# Patient Record
Sex: FEMALE | Race: OTHER | HISPANIC OR LATINO | Employment: UNEMPLOYED | URBAN - METROPOLITAN AREA
[De-identification: names, ages, dates, MRNs, and addresses within clinical notes are randomized per-mention and may not be internally consistent; named-entity substitution may affect disease eponyms.]

---

## 2020-04-13 ENCOUNTER — TELEPHONE (OUTPATIENT)
Dept: OBSTETRICS AND GYNECOLOGY | Facility: CLINIC | Age: 40
End: 2020-04-13

## 2020-04-13 NOTE — TELEPHONE ENCOUNTER
----- Message from Pilar Jalloh sent at 4/13/2020  4:17 PM CDT -----  Contact: pt   Type:  Patient Returning Call    Who Called:pt   Who Left Message for Patient:nq  Does the patient know what this is regarding?:nq  Would the patient rather a call back or a response via [facility]? Callback   Best Call Back Number:[phone]   Additional Information:

## 2020-04-13 NOTE — TELEPHONE ENCOUNTER
----- Message from Ruby Freeman sent at 4/13/2020 11:24 AM CDT -----  Contact: patient  Type:  Needs Medical Advice    Who Called: patient  Symptoms (please be specific): yeast infection   How long has patient had these symptoms:  3 days  Pharmacy name and phone #:  [facility] [place]  Would the patient rather a call back or a response via [facility]? Call back  Best Call Back Number: [phone]  Additional Information: patient was sent [facility] link incases she needed to be seen

## 2020-04-13 NOTE — TELEPHONE ENCOUNTER
Called and spoke with patient. Patient's  PCP, Dr. Rubin, had called out Diflucan last week. She reports no improvement and her PCP said it possibly maybe BV. Scheduled pt virtual appointment for tomorrow morning with Marie Everett for evaluation. Pt given appointment time and verbalized understanding. Cassy Giles

## 2020-04-14 ENCOUNTER — OFFICE VISIT (OUTPATIENT)
Dept: OBSTETRICS AND GYNECOLOGY | Facility: CLINIC | Age: 40
End: 2020-04-14
Payer: COMMERCIAL

## 2020-04-14 ENCOUNTER — TELEPHONE (OUTPATIENT)
Dept: OBSTETRICS AND GYNECOLOGY | Facility: CLINIC | Age: 40
End: 2020-04-14

## 2020-04-14 DIAGNOSIS — N89.8 VAGINAL DISCHARGE: Primary | ICD-10-CM

## 2020-04-14 DIAGNOSIS — B96.89 BACTERIAL VAGINOSIS: ICD-10-CM

## 2020-04-14 DIAGNOSIS — N76.0 BACTERIAL VAGINOSIS: ICD-10-CM

## 2020-04-14 PROCEDURE — 99213 PR OFFICE/OUTPT VISIT, EST, LEVL III, 20-29 MIN: ICD-10-PCS | Mod: 95,,, | Performed by: NURSE PRACTITIONER

## 2020-04-14 PROCEDURE — 99213 OFFICE O/P EST LOW 20 MIN: CPT | Mod: 95,,, | Performed by: NURSE PRACTITIONER

## 2020-04-14 RX ORDER — GLIMEPIRIDE 4 MG/1
TABLET ORAL
COMMUNITY
Start: 2020-04-06 | End: 2021-08-25 | Stop reason: ALTCHOICE

## 2020-04-14 RX ORDER — METFORMIN HYDROCHLORIDE 500 MG/1
TABLET, EXTENDED RELEASE ORAL
COMMUNITY
Start: 2020-04-06 | End: 2021-09-23

## 2020-04-14 RX ORDER — METHENAMINE, SODIUM PHOSPHATE, MONOBASIC, MONOHYDRATE, PHENYL SALICYLATE, METHYLENE BLUE, AND HYOSCYAMINE SULFATE 118; 40.8; 36; 10; .12 MG/1; MG/1; MG/1; MG/1; MG/1
CAPSULE ORAL
COMMUNITY
Start: 2020-03-18 | End: 2021-08-25

## 2020-04-14 RX ORDER — FLUCONAZOLE 150 MG/1
TABLET ORAL
COMMUNITY
Start: 2019-01-31 | End: 2021-09-23

## 2020-04-14 RX ORDER — TINIDAZOLE 500 MG/1
2 TABLET ORAL
Qty: 8 TABLET | Refills: 0 | Status: SHIPPED | OUTPATIENT
Start: 2020-04-14 | End: 2020-04-16

## 2020-04-14 RX ORDER — OXYBUTYNIN CHLORIDE 5 MG/1
TABLET ORAL
COMMUNITY
Start: 2020-03-06 | End: 2021-08-25

## 2020-04-14 RX ORDER — DICLOFENAC SODIUM 75 MG/1
TABLET, DELAYED RELEASE ORAL
COMMUNITY
Start: 2020-04-09 | End: 2022-02-09

## 2020-04-14 RX ORDER — ESTRADIOL 0.1 MG/G
CREAM VAGINAL
COMMUNITY
Start: 2020-02-07 | End: 2021-09-23

## 2020-04-14 RX ORDER — PROMETHAZINE HYDROCHLORIDE AND DEXTROMETHORPHAN HYDROBROMIDE 6.25; 15 MG/5ML; MG/5ML
SYRUP ORAL
COMMUNITY
Start: 2020-03-01 | End: 2021-09-23

## 2020-04-14 RX ORDER — ALBUTEROL SULFATE 90 UG/1
AEROSOL, METERED RESPIRATORY (INHALATION)
COMMUNITY
Start: 2020-03-31 | End: 2023-08-29

## 2020-04-14 NOTE — TELEPHONE ENCOUNTER
----- Message from Dioni Meneses sent at 4/14/2020 10:17 AM CDT -----  Contact: Pt  Rosa said she can't get into the appt for her virtual visit this morning. Please call her to discuss this [phone] (home).

## 2020-04-14 NOTE — PROGRESS NOTES
Subjective:       Patient ID: Rosa Anguiano is a 39 y.o. female.    Chief Complaint:  Vaginal Discharge      History of Present Illness  The patient location is: [place], [place]  The chief complaint leading to consultation is: vaginal discharge  Visit type: audio only  Total time spent with patient: 25 minutes  Each patient to whom he or she provides medical services by telemedicine is:  (1) informed of the relationship between the physician and patient and the respective role of any other health care provider with respect to management of the patient; and (2) notified that he or she may decline to receive medical services by telemedicine and may withdraw from such care at any time.    Notes: pt reports that she was on multiple abx for pneumonia. Reports that she was treated by PCP after about a week being off abx for a potential yeast infection with diflucan 100 mg po daily. She got no relief and started with vaginal discharge that is yellow with a fishy odor. Not on ptobiotics    Vaginal Discharge   The patient's primary symptoms include genital itching, a genital odor and vaginal discharge. The patient's pertinent negatives include no pelvic pain. This is a new problem. The current episode started 1 to 4 weeks ago. The problem occurs constantly. The problem has been unchanged. The patient is experiencing no pain. She is not pregnant. Associated symptoms include back pain and dysuria. Pertinent negatives include no abdominal pain, chills, constipation, fever, flank pain, frequency, headaches, hematuria, nausea, rash, urgency or vomiting. Associated symptoms comments: Reports that it is intermittent and the dysuria is not often. The vaginal discharge was yellow and malodorous. The vaginal bleeding is typical of menses. She has not been passing clots. She has not been passing tissue. Nothing aggravates the symptoms. She has tried nothing for the symptoms. She uses an IUD for contraception. Her menstrual history has  been regular. There is no history of menorrhagia.     vaginal discharge and irritation    GYN & OB History  No LMP recorded.   Date of Last Pap: No result found    OB History    Para Term  AB Living   7 4     3     SAB TAB Ectopic Multiple Live Births   2   1          # Outcome Date GA Lbr Ward/2nd Weight Sex Delivery Anes PTL Lv   7 Ectopic            6 SAB            5 SAB            4 Para      CS-Unspec      3 Para      CS-Unspec      2 Para      CS-Unspec      1 Para      CS-Unspec          Review of Systems  Review of Systems   Constitutional: Negative for activity change, appetite change, chills, fatigue and fever.   HENT: Negative for nasal congestion and tinnitus.    Eyes: Negative for visual disturbance.   Respiratory: Negative for cough and shortness of breath.    Cardiovascular: Negative for chest pain and palpitations.   Gastrointestinal: Negative for abdominal pain, bloating, blood in stool, constipation, nausea and vomiting.   Endocrine: Positive for diabetes. Negative for hair loss and hot flashes.        Recently added glimipride   Genitourinary: Positive for dysuria and vaginal discharge. Negative for bladder incontinence, decreased libido, dysmenorrhea, dyspareunia, flank pain, frequency, genital sores, hematuria, hot flashes, menorrhagia, menstrual problem, pelvic pain, urgency, vaginal bleeding, vaginal pain, urinary incontinence, postcoital bleeding, postmenopausal bleeding, vaginal dryness and vaginal odor.        Fishy odor   Musculoskeletal: Positive for back pain. Negative for arthralgias, leg pain and myalgias.   Integumentary:  Negative for rash, acne, hair changes, mole/lesion, breast mass, nipple discharge, breast skin changes and breast tenderness.   Neurological: Negative for vertigo, syncope, numbness and headaches.   Hematological: Does not bruise/bleed easily.   Psychiatric/Behavioral: Negative for depression and sleep disturbance. The patient is not nervous/anxious.     Breast: Negative for asymmetry, lump, mass, mastodynia, nipple discharge, skin changes and tenderness          Objective:    Physical Exam:    HENT:   Nose: No epistaxis.                                   Assessment:        1. Vaginal discharge       2. Bacterial Vaginosis        Plan:      It is presumed that you vaginitis. This can cause itching, odor, burning and discharge. I will call you with your swab results. In the mean time please start a probiotic. The probiotic will help to balance vaginal PH decreasing vaginal inflammation and boost vaginal health. Do not douche. Refrain from tub bathing at this time. The following medication has been sent to your pharmacy:Tinidazole

## 2020-04-14 NOTE — PATIENT INSTRUCTIONS

## 2020-04-14 NOTE — TELEPHONE ENCOUNTER
Called and spoke with patient. She is having trouble with the [employer] pa[employer]lette due to poor wifi connection. Pt carrol Salazar will do a telephone visit. Pt verbalized understanding. Cassy Giles

## 2020-09-30 ENCOUNTER — TELEPHONE (OUTPATIENT)
Dept: OBSTETRICS AND GYNECOLOGY | Facility: CLINIC | Age: 40
End: 2020-09-30

## 2020-09-30 NOTE — TELEPHONE ENCOUNTER
----- Message from Zion Xavier sent at 9/30/2020  2:43 PM CDT -----  Type:  Needs Medical Advice    Who Called: pt  Symptoms (please be specific): pt has a yeast infection   How long has patient had these symptoms:  3 days  Pharmacy name and phone #:    [id number] - [place], [place] - [address] AT [address]  ([address] [facility]  [address]  [place] [address]  Phone: [phone] Fax: [phone]  Would the patient rather a call back or a response via [facility]? Call back  Best Call Back Number: [phone]  Additional Information: Caller is calling in regards to something being called in for a yeast infection // pt states she is in pain //

## 2020-09-30 NOTE — TELEPHONE ENCOUNTER
Called and spoke with patient regarding her message. Pt adv to come into office for evaluation. Pt verbalized understanding and scheduled appointment for Friday.

## 2021-08-20 ENCOUNTER — TELEPHONE (OUTPATIENT)
Dept: OBSTETRICS AND GYNECOLOGY | Facility: CLINIC | Age: 41
End: 2021-08-20

## 2021-08-25 ENCOUNTER — OFFICE VISIT (OUTPATIENT)
Dept: UROLOGY | Facility: CLINIC | Age: 41
End: 2021-08-25
Payer: MEDICARE

## 2021-08-25 VITALS — BODY MASS INDEX: 34.68 KG/M2 | HEIGHT: 59 IN | WEIGHT: 172 LBS

## 2021-08-25 DIAGNOSIS — R30.0 DYSURIA: Primary | ICD-10-CM

## 2021-08-25 LAB — POC RESIDUAL URINE VOLUME: 0 ML (ref 0–100)

## 2021-08-25 PROCEDURE — 51798 US URINE CAPACITY MEASURE: CPT | Mod: S$GLB,,, | Performed by: NURSE PRACTITIONER

## 2021-08-25 PROCEDURE — 99214 PR OFFICE/OUTPT VISIT, EST, LEVL IV, 30-39 MIN: ICD-10-PCS | Mod: S$GLB,,, | Performed by: NURSE PRACTITIONER

## 2021-08-25 PROCEDURE — 51798 POCT BLADDER SCAN: ICD-10-PCS | Mod: S$GLB,,, | Performed by: NURSE PRACTITIONER

## 2021-08-25 PROCEDURE — 99214 OFFICE O/P EST MOD 30 MIN: CPT | Mod: S$GLB,,, | Performed by: NURSE PRACTITIONER

## 2021-08-25 RX ORDER — PHENAZOPYRIDINE HYDROCHLORIDE 200 MG/1
TABLET, FILM COATED ORAL
COMMUNITY
Start: 2021-08-02 | End: 2021-08-25

## 2021-08-25 RX ORDER — VALACYCLOVIR HYDROCHLORIDE 1 G/1
1000 TABLET, FILM COATED ORAL 3 TIMES DAILY
COMMUNITY
Start: 2021-08-17 | End: 2021-09-23

## 2021-08-25 RX ORDER — GABAPENTIN 400 MG/1
CAPSULE ORAL
COMMUNITY
Start: 2021-04-06 | End: 2021-09-23

## 2021-08-25 RX ORDER — SOLIFENACIN SUCCINATE 10 MG/1
10 TABLET, FILM COATED ORAL DAILY
Qty: 30 TABLET | Refills: 11 | Status: SHIPPED | OUTPATIENT
Start: 2021-08-25 | End: 2021-09-23

## 2021-08-25 RX ORDER — ZINC OXIDE/CORN STARCH 15 %-81 %
1 POWDER (GRAM) TOPICAL 3 TIMES DAILY PRN
Qty: 90 TABLET | Refills: 5 | Status: SHIPPED | OUTPATIENT
Start: 2021-08-25 | End: 2022-04-26

## 2021-08-25 RX ORDER — OMEPRAZOLE 40 MG/1
40 CAPSULE, DELAYED RELEASE ORAL EVERY MORNING
COMMUNITY
Start: 2021-04-20 | End: 2022-04-20 | Stop reason: ALTCHOICE

## 2021-08-26 RX ORDER — MONTELUKAST SODIUM 10 MG/1
TABLET ORAL
COMMUNITY
Start: 2021-04-08 | End: 2022-04-26

## 2021-08-26 RX ORDER — MELOXICAM 15 MG/1
TABLET ORAL
COMMUNITY
Start: 2021-07-26 | End: 2022-02-09

## 2021-08-26 RX ORDER — FLUCONAZOLE 150 MG/1
150 TABLET ORAL EVERY OTHER DAY
Qty: 2 TABLET | Refills: 0 | Status: SHIPPED | OUTPATIENT
Start: 2021-08-26 | End: 2021-08-29

## 2021-08-26 RX ORDER — HYDROXYZINE HYDROCHLORIDE 25 MG/1
25 TABLET, FILM COATED ORAL
COMMUNITY
Start: 2019-02-01 | End: 2022-04-20 | Stop reason: ALTCHOICE

## 2021-08-26 RX ORDER — SEMAGLUTIDE 1.34 MG/ML
INJECTION, SOLUTION SUBCUTANEOUS
COMMUNITY
Start: 2021-08-23 | End: 2022-04-26

## 2021-08-26 RX ORDER — PREGABALIN 150 MG/1
150 CAPSULE ORAL 2 TIMES DAILY
COMMUNITY
Start: 2021-03-08 | End: 2022-08-10 | Stop reason: SDUPTHER

## 2021-08-26 RX ORDER — GABAPENTIN 300 MG/1
300 CAPSULE ORAL 3 TIMES DAILY
COMMUNITY
Start: 2021-03-31 | End: 2021-09-23

## 2021-08-26 RX ORDER — DULOXETIN HYDROCHLORIDE 30 MG/1
CAPSULE, DELAYED RELEASE ORAL
COMMUNITY
Start: 2021-08-26 | End: 2022-04-26

## 2021-08-26 RX ORDER — ROPINIROLE 1 MG/1
TABLET, FILM COATED ORAL
COMMUNITY
Start: 2021-08-18 | End: 2022-04-26

## 2021-09-23 ENCOUNTER — OFFICE VISIT (OUTPATIENT)
Dept: OBSTETRICS AND GYNECOLOGY | Facility: CLINIC | Age: 41
End: 2021-09-23
Payer: COMMERCIAL

## 2021-09-23 VITALS
BODY MASS INDEX: 34.68 KG/M2 | DIASTOLIC BLOOD PRESSURE: 86 MMHG | HEIGHT: 59 IN | WEIGHT: 172 LBS | HEART RATE: 74 BPM | SYSTOLIC BLOOD PRESSURE: 129 MMHG

## 2021-09-23 DIAGNOSIS — N89.8 VAGINAL LEUKORRHEA: Primary | ICD-10-CM

## 2021-09-23 DIAGNOSIS — R10.2 ACUTE PELVIC PAIN, FEMALE: ICD-10-CM

## 2021-09-23 DIAGNOSIS — R30.0 DYSURIA: ICD-10-CM

## 2021-09-23 LAB
BILIRUB SERPL-MCNC: NORMAL MG/DL
BLOOD URINE, POC: NEGATIVE
CLARITY, POC UA: CLEAR
COLOR, POC UA: YELLOW
GLUCOSE UR QL STRIP: NORMAL
KETONES UR QL STRIP: NORMAL
LEUKOCYTE ESTERASE URINE, POC: NEGATIVE
NITRITE, POC UA: NEGATIVE
PH, POC UA: NORMAL
PROTEIN, POC: NORMAL
SPECIFIC GRAVITY, POC UA: NORMAL
UROBILINOGEN, POC UA: NORMAL

## 2021-09-23 PROCEDURE — 1160F RVW MEDS BY RX/DR IN RCRD: CPT | Mod: CPTII,S$GLB,, | Performed by: NURSE PRACTITIONER

## 2021-09-23 PROCEDURE — 3074F SYST BP LT 130 MM HG: CPT | Mod: CPTII,S$GLB,, | Performed by: NURSE PRACTITIONER

## 2021-09-23 PROCEDURE — 1160F PR REVIEW ALL MEDS BY PRESCRIBER/CLIN PHARMACIST DOCUMENTED: ICD-10-PCS | Mod: CPTII,S$GLB,, | Performed by: NURSE PRACTITIONER

## 2021-09-23 PROCEDURE — 81002 POCT URINE DIPSTICK WITHOUT MICROSCOPE: ICD-10-PCS | Mod: S$GLB,,, | Performed by: NURSE PRACTITIONER

## 2021-09-23 PROCEDURE — 3074F PR MOST RECENT SYSTOLIC BLOOD PRESSURE < 130 MM HG: ICD-10-PCS | Mod: CPTII,S$GLB,, | Performed by: NURSE PRACTITIONER

## 2021-09-23 PROCEDURE — 1159F MED LIST DOCD IN RCRD: CPT | Mod: CPTII,S$GLB,, | Performed by: NURSE PRACTITIONER

## 2021-09-23 PROCEDURE — 99213 OFFICE O/P EST LOW 20 MIN: CPT | Mod: 25,S$GLB,, | Performed by: NURSE PRACTITIONER

## 2021-09-23 PROCEDURE — 3008F PR BODY MASS INDEX (BMI) DOCUMENTED: ICD-10-PCS | Mod: CPTII,S$GLB,, | Performed by: NURSE PRACTITIONER

## 2021-09-23 PROCEDURE — 1159F PR MEDICATION LIST DOCUMENTED IN MEDICAL RECORD: ICD-10-PCS | Mod: CPTII,S$GLB,, | Performed by: NURSE PRACTITIONER

## 2021-09-23 PROCEDURE — 81002 URINALYSIS NONAUTO W/O SCOPE: CPT | Mod: S$GLB,,, | Performed by: NURSE PRACTITIONER

## 2021-09-23 PROCEDURE — 3079F PR MOST RECENT DIASTOLIC BLOOD PRESSURE 80-89 MM HG: ICD-10-PCS | Mod: CPTII,S$GLB,, | Performed by: NURSE PRACTITIONER

## 2021-09-23 PROCEDURE — 3008F BODY MASS INDEX DOCD: CPT | Mod: CPTII,S$GLB,, | Performed by: NURSE PRACTITIONER

## 2021-09-23 PROCEDURE — 99213 PR OFFICE/OUTPT VISIT, EST, LEVL III, 20-29 MIN: ICD-10-PCS | Mod: 25,S$GLB,, | Performed by: NURSE PRACTITIONER

## 2021-09-23 PROCEDURE — 3079F DIAST BP 80-89 MM HG: CPT | Mod: CPTII,S$GLB,, | Performed by: NURSE PRACTITIONER

## 2021-09-23 RX ORDER — TINIDAZOLE 500 MG/1
2 TABLET ORAL
Qty: 8 TABLET | Refills: 0 | Status: SHIPPED | OUTPATIENT
Start: 2021-09-23 | End: 2021-09-25

## 2021-09-23 RX ORDER — FLUCONAZOLE 150 MG/1
150 TABLET ORAL EVERY OTHER DAY
Qty: 2 TABLET | Refills: 0 | Status: SHIPPED | OUTPATIENT
Start: 2021-09-23 | End: 2021-09-26

## 2021-09-24 ENCOUNTER — PROCEDURE VISIT (OUTPATIENT)
Dept: OBSTETRICS AND GYNECOLOGY | Facility: CLINIC | Age: 41
End: 2021-09-24
Payer: COMMERCIAL

## 2021-09-24 ENCOUNTER — TELEPHONE (OUTPATIENT)
Dept: OBSTETRICS AND GYNECOLOGY | Facility: CLINIC | Age: 41
End: 2021-09-24

## 2021-09-24 DIAGNOSIS — R10.2 ACUTE PELVIC PAIN, FEMALE: ICD-10-CM

## 2021-09-24 PROCEDURE — 76830 PR  ECHOGRAPHY,TRANSVAGINAL: ICD-10-PCS | Mod: 26,S$GLB,, | Performed by: OBSTETRICS & GYNECOLOGY

## 2021-09-24 PROCEDURE — 76830 TRANSVAGINAL US NON-OB: CPT | Mod: 26,S$GLB,, | Performed by: OBSTETRICS & GYNECOLOGY

## 2021-09-27 ENCOUNTER — TELEPHONE (OUTPATIENT)
Dept: UROLOGY | Facility: CLINIC | Age: 41
End: 2021-09-27

## 2021-09-30 ENCOUNTER — PATIENT MESSAGE (OUTPATIENT)
Dept: OBSTETRICS AND GYNECOLOGY | Facility: CLINIC | Age: 41
End: 2021-09-30

## 2021-09-30 ENCOUNTER — OFFICE VISIT (OUTPATIENT)
Dept: UROLOGY | Facility: CLINIC | Age: 41
End: 2021-09-30
Payer: COMMERCIAL

## 2021-09-30 VITALS
DIASTOLIC BLOOD PRESSURE: 67 MMHG | BODY MASS INDEX: 34.68 KG/M2 | HEIGHT: 59 IN | RESPIRATION RATE: 18 BRPM | SYSTOLIC BLOOD PRESSURE: 118 MMHG | WEIGHT: 172 LBS | HEART RATE: 79 BPM

## 2021-09-30 DIAGNOSIS — N30.10 INTERSTITIAL CYSTITIS: ICD-10-CM

## 2021-09-30 DIAGNOSIS — N39.3 STRESS INCONTINENCE: Primary | ICD-10-CM

## 2021-09-30 PROCEDURE — 3008F PR BODY MASS INDEX (BMI) DOCUMENTED: ICD-10-PCS | Mod: CPTII,S$GLB,, | Performed by: UROLOGY

## 2021-09-30 PROCEDURE — 81001 URINALYSIS AUTO W/SCOPE: CPT | Mod: S$GLB,,, | Performed by: UROLOGY

## 2021-09-30 PROCEDURE — 1160F RVW MEDS BY RX/DR IN RCRD: CPT | Mod: CPTII,S$GLB,, | Performed by: UROLOGY

## 2021-09-30 PROCEDURE — 81001 PR  URINALYSIS, AUTO, W/SCOPE: ICD-10-PCS | Mod: S$GLB,,, | Performed by: UROLOGY

## 2021-09-30 PROCEDURE — 3078F PR MOST RECENT DIASTOLIC BLOOD PRESSURE < 80 MM HG: ICD-10-PCS | Mod: CPTII,S$GLB,, | Performed by: UROLOGY

## 2021-09-30 PROCEDURE — 99214 OFFICE O/P EST MOD 30 MIN: CPT | Mod: S$GLB,,, | Performed by: UROLOGY

## 2021-09-30 PROCEDURE — 51798 US URINE CAPACITY MEASURE: CPT | Mod: S$GLB,,, | Performed by: UROLOGY

## 2021-09-30 PROCEDURE — 3074F PR MOST RECENT SYSTOLIC BLOOD PRESSURE < 130 MM HG: ICD-10-PCS | Mod: CPTII,S$GLB,, | Performed by: UROLOGY

## 2021-09-30 PROCEDURE — 3008F BODY MASS INDEX DOCD: CPT | Mod: CPTII,S$GLB,, | Performed by: UROLOGY

## 2021-09-30 PROCEDURE — 3078F DIAST BP <80 MM HG: CPT | Mod: CPTII,S$GLB,, | Performed by: UROLOGY

## 2021-09-30 PROCEDURE — 1160F PR REVIEW ALL MEDS BY PRESCRIBER/CLIN PHARMACIST DOCUMENTED: ICD-10-PCS | Mod: CPTII,S$GLB,, | Performed by: UROLOGY

## 2021-09-30 PROCEDURE — 3074F SYST BP LT 130 MM HG: CPT | Mod: CPTII,S$GLB,, | Performed by: UROLOGY

## 2021-09-30 PROCEDURE — 1159F MED LIST DOCD IN RCRD: CPT | Mod: CPTII,S$GLB,, | Performed by: UROLOGY

## 2021-09-30 PROCEDURE — 1159F PR MEDICATION LIST DOCUMENTED IN MEDICAL RECORD: ICD-10-PCS | Mod: CPTII,S$GLB,, | Performed by: UROLOGY

## 2021-09-30 PROCEDURE — 99214 PR OFFICE/OUTPT VISIT, EST, LEVL IV, 30-39 MIN: ICD-10-PCS | Mod: S$GLB,,, | Performed by: UROLOGY

## 2021-09-30 PROCEDURE — 51798 PR MEAS,POST-VOID RES,US,NON-IMAGING: ICD-10-PCS | Mod: S$GLB,,, | Performed by: UROLOGY

## 2021-09-30 RX ORDER — OXYBUTYNIN CHLORIDE 5 MG/1
5 TABLET ORAL 3 TIMES DAILY
Qty: 90 TABLET | Refills: 11 | Status: SHIPPED | OUTPATIENT
Start: 2021-09-30 | End: 2022-02-28

## 2021-09-30 RX ORDER — OXYBUTYNIN CHLORIDE 5 MG/1
5 TABLET ORAL 3 TIMES DAILY
Qty: 90 TABLET | Refills: 11 | Status: SHIPPED | OUTPATIENT
Start: 2021-09-30 | End: 2021-09-30

## 2021-09-30 RX ORDER — TRAZODONE HYDROCHLORIDE 50 MG/1
TABLET ORAL
COMMUNITY
Start: 2021-09-23 | End: 2022-04-20 | Stop reason: ALTCHOICE

## 2021-10-07 ENCOUNTER — PATIENT MESSAGE (OUTPATIENT)
Dept: OBSTETRICS AND GYNECOLOGY | Facility: CLINIC | Age: 41
End: 2021-10-07

## 2021-10-22 ENCOUNTER — OFFICE VISIT (OUTPATIENT)
Dept: OBSTETRICS AND GYNECOLOGY | Facility: CLINIC | Age: 41
End: 2021-10-22
Payer: COMMERCIAL

## 2021-10-22 VITALS
DIASTOLIC BLOOD PRESSURE: 79 MMHG | BODY MASS INDEX: 35.28 KG/M2 | HEART RATE: 98 BPM | WEIGHT: 175 LBS | SYSTOLIC BLOOD PRESSURE: 118 MMHG | HEIGHT: 59 IN

## 2021-10-22 DIAGNOSIS — N89.8 VAGINAL LEUKORRHEA: Primary | ICD-10-CM

## 2021-10-22 PROCEDURE — 3008F BODY MASS INDEX DOCD: CPT | Mod: CPTII,S$GLB,, | Performed by: NURSE PRACTITIONER

## 2021-10-22 PROCEDURE — 1159F PR MEDICATION LIST DOCUMENTED IN MEDICAL RECORD: ICD-10-PCS | Mod: CPTII,S$GLB,, | Performed by: NURSE PRACTITIONER

## 2021-10-22 PROCEDURE — 3008F PR BODY MASS INDEX (BMI) DOCUMENTED: ICD-10-PCS | Mod: CPTII,S$GLB,, | Performed by: NURSE PRACTITIONER

## 2021-10-22 PROCEDURE — 3074F PR MOST RECENT SYSTOLIC BLOOD PRESSURE < 130 MM HG: ICD-10-PCS | Mod: CPTII,S$GLB,, | Performed by: NURSE PRACTITIONER

## 2021-10-22 PROCEDURE — 3078F PR MOST RECENT DIASTOLIC BLOOD PRESSURE < 80 MM HG: ICD-10-PCS | Mod: CPTII,S$GLB,, | Performed by: NURSE PRACTITIONER

## 2021-10-22 PROCEDURE — 3074F SYST BP LT 130 MM HG: CPT | Mod: CPTII,S$GLB,, | Performed by: NURSE PRACTITIONER

## 2021-10-22 PROCEDURE — 1159F MED LIST DOCD IN RCRD: CPT | Mod: CPTII,S$GLB,, | Performed by: NURSE PRACTITIONER

## 2021-10-22 PROCEDURE — 3078F DIAST BP <80 MM HG: CPT | Mod: CPTII,S$GLB,, | Performed by: NURSE PRACTITIONER

## 2021-10-22 PROCEDURE — 1160F RVW MEDS BY RX/DR IN RCRD: CPT | Mod: CPTII,S$GLB,, | Performed by: NURSE PRACTITIONER

## 2021-10-22 PROCEDURE — 99214 PR OFFICE/OUTPT VISIT, EST, LEVL IV, 30-39 MIN: ICD-10-PCS | Mod: S$GLB,,, | Performed by: NURSE PRACTITIONER

## 2021-10-22 PROCEDURE — 99214 OFFICE O/P EST MOD 30 MIN: CPT | Mod: S$GLB,,, | Performed by: NURSE PRACTITIONER

## 2021-10-22 PROCEDURE — 1160F PR REVIEW ALL MEDS BY PRESCRIBER/CLIN PHARMACIST DOCUMENTED: ICD-10-PCS | Mod: CPTII,S$GLB,, | Performed by: NURSE PRACTITIONER

## 2021-10-27 ENCOUNTER — PATIENT MESSAGE (OUTPATIENT)
Dept: OBSTETRICS AND GYNECOLOGY | Facility: CLINIC | Age: 41
End: 2021-10-27
Payer: COMMERCIAL

## 2021-10-27 ENCOUNTER — TELEPHONE (OUTPATIENT)
Dept: UROLOGY | Facility: CLINIC | Age: 41
End: 2021-10-27
Payer: COMMERCIAL

## 2021-10-28 ENCOUNTER — PROCEDURE VISIT (OUTPATIENT)
Dept: UROLOGY | Facility: CLINIC | Age: 41
End: 2021-10-28
Payer: COMMERCIAL

## 2021-10-28 ENCOUNTER — TELEPHONE (OUTPATIENT)
Dept: UROLOGY | Facility: CLINIC | Age: 41
End: 2021-10-28

## 2021-10-28 ENCOUNTER — TELEPHONE (OUTPATIENT)
Dept: OBSTETRICS AND GYNECOLOGY | Facility: CLINIC | Age: 41
End: 2021-10-28
Payer: COMMERCIAL

## 2021-10-28 VITALS
WEIGHT: 174 LBS | HEART RATE: 90 BPM | BODY MASS INDEX: 35.14 KG/M2 | OXYGEN SATURATION: 99 % | SYSTOLIC BLOOD PRESSURE: 134 MMHG | DIASTOLIC BLOOD PRESSURE: 63 MMHG | RESPIRATION RATE: 15 BRPM

## 2021-10-28 DIAGNOSIS — B96.89 BV (BACTERIAL VAGINOSIS): ICD-10-CM

## 2021-10-28 DIAGNOSIS — N76.0 BV (BACTERIAL VAGINOSIS): ICD-10-CM

## 2021-10-28 DIAGNOSIS — N39.3 STRESS INCONTINENCE: ICD-10-CM

## 2021-10-28 DIAGNOSIS — B37.9 CANDIDA GLABRATA INFECTION: Primary | ICD-10-CM

## 2021-10-28 PROCEDURE — 52000 CYSTOURETHROSCOPY: CPT | Mod: S$GLB,,, | Performed by: UROLOGY

## 2021-10-28 PROCEDURE — 52000 CYSTOSCOPY: ICD-10-PCS | Mod: S$GLB,,, | Performed by: UROLOGY

## 2021-10-28 RX ORDER — FLUCONAZOLE 100 MG/1
TABLET ORAL
Qty: 24 TABLET | Refills: 0 | Status: SHIPPED | OUTPATIENT
Start: 2021-10-28 | End: 2022-04-20 | Stop reason: ALTCHOICE

## 2021-11-12 ENCOUNTER — TELEPHONE (OUTPATIENT)
Dept: OBSTETRICS AND GYNECOLOGY | Facility: CLINIC | Age: 41
End: 2021-11-12
Payer: COMMERCIAL

## 2021-11-12 ENCOUNTER — PATIENT MESSAGE (OUTPATIENT)
Dept: OBSTETRICS AND GYNECOLOGY | Facility: CLINIC | Age: 41
End: 2021-11-12
Payer: COMMERCIAL

## 2021-11-12 RX ORDER — IBREXAFUNGERP 150 MG/1
300 TABLET, FILM COATED ORAL 2 TIMES DAILY
Qty: 4 TABLET | Refills: 0 | Status: SHIPPED | OUTPATIENT
Start: 2021-11-12 | End: 2022-04-26

## 2022-01-04 ENCOUNTER — PATIENT MESSAGE (OUTPATIENT)
Dept: OBSTETRICS AND GYNECOLOGY | Facility: CLINIC | Age: 42
End: 2022-01-04
Payer: COMMERCIAL

## 2022-01-05 ENCOUNTER — PATIENT MESSAGE (OUTPATIENT)
Dept: OBSTETRICS AND GYNECOLOGY | Facility: CLINIC | Age: 42
End: 2022-01-05
Payer: COMMERCIAL

## 2022-01-05 DIAGNOSIS — R32 URINARY INCONTINENCE, UNSPECIFIED TYPE: Primary | ICD-10-CM

## 2022-01-07 ENCOUNTER — PATIENT MESSAGE (OUTPATIENT)
Dept: OBSTETRICS AND GYNECOLOGY | Facility: CLINIC | Age: 42
End: 2022-01-07
Payer: COMMERCIAL

## 2022-01-11 ENCOUNTER — PATIENT MESSAGE (OUTPATIENT)
Dept: OBSTETRICS AND GYNECOLOGY | Facility: CLINIC | Age: 42
End: 2022-01-11
Payer: COMMERCIAL

## 2022-02-09 DIAGNOSIS — N30.10 INTERSTITIAL CYSTITIS: ICD-10-CM

## 2022-02-09 RX ORDER — METHENAMINE, SODIUM PHOSPHATE, MONOBASIC, MONOHYDRATE, PHENYL SALICYLATE, METHYLENE BLUE, AND HYOSCYAMINE SULFATE 118; 40.8; 36; 10; .12 MG/1; MG/1; MG/1; MG/1; MG/1
CAPSULE ORAL
Qty: 30 CAPSULE | Refills: 0 | Status: SHIPPED | OUTPATIENT
Start: 2022-02-09 | End: 2022-02-28 | Stop reason: SDUPTHER

## 2022-02-21 ENCOUNTER — TELEPHONE (OUTPATIENT)
Dept: GASTROENTEROLOGY | Facility: CLINIC | Age: 42
End: 2022-02-21
Payer: COMMERCIAL

## 2022-02-22 NOTE — TELEPHONE ENCOUNTER
----- Message from Anitha Fermin sent at 2/21/2022 10:53 AM CST -----  pt needs call back regarding medication directions.....[phone] ([facility])       
I LVM for the patient to call me back. I am not sure what medication she is referring to as we have never seen her and do not prescribe anything to her yet.  MARYL, CMA  
Statement Selected

## 2022-02-28 ENCOUNTER — OFFICE VISIT (OUTPATIENT)
Dept: UROLOGY | Facility: CLINIC | Age: 42
End: 2022-02-28
Payer: COMMERCIAL

## 2022-02-28 VITALS
WEIGHT: 175 LBS | BODY MASS INDEX: 35.28 KG/M2 | DIASTOLIC BLOOD PRESSURE: 85 MMHG | HEIGHT: 59 IN | SYSTOLIC BLOOD PRESSURE: 132 MMHG | HEART RATE: 90 BPM

## 2022-02-28 DIAGNOSIS — N39.3 STRESS INCONTINENCE: ICD-10-CM

## 2022-02-28 DIAGNOSIS — N30.10 INTERSTITIAL CYSTITIS: Primary | ICD-10-CM

## 2022-02-28 PROCEDURE — 1159F MED LIST DOCD IN RCRD: CPT | Mod: CPTII,S$GLB,, | Performed by: NURSE PRACTITIONER

## 2022-02-28 PROCEDURE — 3079F PR MOST RECENT DIASTOLIC BLOOD PRESSURE 80-89 MM HG: ICD-10-PCS | Mod: CPTII,S$GLB,, | Performed by: NURSE PRACTITIONER

## 2022-02-28 PROCEDURE — 3075F PR MOST RECENT SYSTOLIC BLOOD PRESS GE 130-139MM HG: ICD-10-PCS | Mod: CPTII,S$GLB,, | Performed by: NURSE PRACTITIONER

## 2022-02-28 PROCEDURE — 3075F SYST BP GE 130 - 139MM HG: CPT | Mod: CPTII,S$GLB,, | Performed by: NURSE PRACTITIONER

## 2022-02-28 PROCEDURE — 3079F DIAST BP 80-89 MM HG: CPT | Mod: CPTII,S$GLB,, | Performed by: NURSE PRACTITIONER

## 2022-02-28 PROCEDURE — 1160F PR REVIEW ALL MEDS BY PRESCRIBER/CLIN PHARMACIST DOCUMENTED: ICD-10-PCS | Mod: CPTII,S$GLB,, | Performed by: NURSE PRACTITIONER

## 2022-02-28 PROCEDURE — 99214 PR OFFICE/OUTPT VISIT, EST, LEVL IV, 30-39 MIN: ICD-10-PCS | Mod: S$GLB,,, | Performed by: NURSE PRACTITIONER

## 2022-02-28 PROCEDURE — 1160F RVW MEDS BY RX/DR IN RCRD: CPT | Mod: CPTII,S$GLB,, | Performed by: NURSE PRACTITIONER

## 2022-02-28 PROCEDURE — 99214 OFFICE O/P EST MOD 30 MIN: CPT | Mod: S$GLB,,, | Performed by: NURSE PRACTITIONER

## 2022-02-28 PROCEDURE — 1159F PR MEDICATION LIST DOCUMENTED IN MEDICAL RECORD: ICD-10-PCS | Mod: CPTII,S$GLB,, | Performed by: NURSE PRACTITIONER

## 2022-02-28 PROCEDURE — 3008F BODY MASS INDEX DOCD: CPT | Mod: CPTII,S$GLB,, | Performed by: NURSE PRACTITIONER

## 2022-02-28 PROCEDURE — 3008F PR BODY MASS INDEX (BMI) DOCUMENTED: ICD-10-PCS | Mod: CPTII,S$GLB,, | Performed by: NURSE PRACTITIONER

## 2022-02-28 RX ORDER — METHENAMINE, SODIUM PHOSPHATE, MONOBASIC, MONOHYDRATE, PHENYL SALICYLATE, METHYLENE BLUE, AND HYOSCYAMINE SULFATE 118; 40.8; 36; 10; .12 MG/1; MG/1; MG/1; MG/1; MG/1
CAPSULE ORAL
Qty: 30 CAPSULE | Refills: 0 | Status: SHIPPED | OUTPATIENT
Start: 2022-02-28 | End: 2022-05-09 | Stop reason: SDUPTHER

## 2022-02-28 RX ORDER — OXYBUTYNIN CHLORIDE 15 MG/1
15 TABLET, EXTENDED RELEASE ORAL DAILY
Qty: 30 TABLET | Refills: 11 | Status: SHIPPED | OUTPATIENT
Start: 2022-02-28 | End: 2023-03-15

## 2022-02-28 NOTE — PROGRESS NOTES
Subjective:       Patient ID: Rosa Anguiano is a 41 y.o. female.    Chief Complaint: Urinary Incontinence (Bladder spasms/) and Other (Pt has lots of burning before and after urinating also some  ua incontinence.)      HPI: 41-year-old female, established patient, presents for 6 month visit.  Patient has a history of interstitial cystitis and stress incontinence.  Patient underwent a cysto on 10/20/2021 with Dr. Lloyd.  After cysto recommended surgery for due to her incontinence.  Patient opted not to proceed with surgery due to concerns of the FX surgery would have inner interstitial cystitis.    Patient presents today with with complaint of interstitial cystitis issues.  She is complaining of burning with urination, frequency, urgency, bladder pressure, and bladder pain.  Patient states symptoms are worse after intercourse.  Patient states she wakes up with pain.    Patient states she somewhat follows an IC diet.  She is also on Ditropan 5 mg 3 times a day.  She uses Uribel as needed.    Patient had DMSO treatment in the past.  Patient states that did provide relief.       Past Medical History:   Past Medical History:   Diagnosis Date    Acute pelvic pain, female     Breast pain     Cystitis     Diabetes mellitus     Dyspareunia, female     Endometriosis     Fibromyalgia     Interstitial cystitis     Irregular menstrual cycle     Leukorrhea     Osteoarthritis     Ovarian cyst     Pneumonia     YAMILET (stress urinary incontinence, female)     Vaginal yeast infection     Vulvitis        Past Surgical Historical:   Past Surgical History:   Procedure Laterality Date     SECTION      CHOLECYSTECTOMY      LAPAROSCOPY-ASSISTED VAGINAL HYSTERECTOMY      TUBAL LIGATION          Medications:   Medication List with Changes/Refills   New Medications    OXYBUTYNIN (DITROPAN XL) 15 MG TR24    Take 1 tablet (15 mg total) by mouth once daily.   Current Medications    BORIC ACID (PH-D) 600 MG VAGINAL  SUPPOSITORY    Place 1 suppository (600 mg total) vaginally every evening. As directed    DULOXETINE (CYMBALTA) 30 MG CAPSULE        FLUCONAZOLE (DIFLUCAN) 100 MG TABLET    Take one pill po once a week for 6 months    HYDROXYZINE HCL (ATARAX) 25 MG TABLET    Take 25 mg by mouth.    IBREXAFUNGERP (BREXAFEMME) 150 MG TAB    Take 300 mg by mouth 2 (two) times a day.    MONTELUKAST (SINGULAIR) 10 MG TABLET        OMEPRAZOLE (PRILOSEC) 40 MG CAPSULE    Take 40 mg by mouth every morning.    OZEMPIC 0.25 MG OR 0.5 MG(2 MG/1.5 ML) PEN INJECTOR        PHENAZOPYRIDINE (URISTAT ULTRA) 99.5 MG TAB    Take 1 tablet by mouth 3 (three) times daily as needed (dysuria).    PREGABALIN (LYRICA) 150 MG CAPSULE    Take 150 mg by mouth 2 (two) times daily.    PROAIR HFA 90 MCG/ACTUATION INHALER    INL 2 PFS PO Q 4 H    ROPINIROLE (REQUIP) 1 MG TABLET        TRAZODONE (DESYREL) 50 MG TABLET       Changed and/or Refilled Medications    Modified Medication Previous Medication    METHEN-M.BLUE-S.PHOS-PHSAL-HYO (URIBEL) 118-10-40.8-36 MG CAP methen-m.blue-s.phos-phsal-hyo (URIBEL) 118-10-40.8-36 mg Cap       TAKE 1 CAPSULE BY MOUTH THREE TIMES DAILY AS NEEDED FOR DYSURIA/BLADDER SPASMS    TAKE 1 CAPSULE BY MOUTH THREE TIMES DAILY AS NEEDED FOR DYSURIA/BLADDER SPASMS   Discontinued Medications    OXYBUTYNIN (DITROPAN) 5 MG TAB    Take 1 tablet (5 mg total) by mouth 3 (three) times daily.        Past Social History:   Social History     Socioeconomic History    Marital status:    Tobacco Use    Smoking status: Never Smoker    Smokeless tobacco: Never Used   Substance and Sexual Activity    Alcohol use: Not Currently    Drug use: Never    Sexual activity: Yes     Partners: Male     Birth control/protection: See Surgical Hx     Comment: Hysterectomy       Allergies:   Review of patient's allergies indicates:   Allergen Reactions    Trulicity [dulaglutide]      Vomiting        Family History:   Family History   Problem Relation Age  of Onset    Colon cancer Father 60    Prostate cancer Father 60    Breast cancer Sister 66    Ovarian cancer Neg Hx     Uterine cancer Neg Hx     Melanoma Neg Hx     Pancreatic cancer Neg Hx         Review of Systems:  Review of Systems   Constitutional: Negative for activity change and appetite change.   HENT: Negative for congestion and dental problem.    Respiratory: Negative for chest tightness and shortness of breath.    Cardiovascular: Negative for chest pain.   Gastrointestinal: Negative for abdominal distention and abdominal pain.   Genitourinary: Positive for dysuria, frequency, pelvic pain and urgency. Negative for decreased urine volume, difficulty urinating, dyspareunia, enuresis, flank pain, genital sores and hematuria.   Musculoskeletal: Negative for back pain and neck pain.   Allergic/Immunologic: Negative for immunocompromised state.   Neurological: Negative for dizziness.   Hematological: Negative for adenopathy.   Psychiatric/Behavioral: Negative for agitation, behavioral problems and confusion.       Physical Exam:  Physical Exam  Vitals and nursing note reviewed.   Constitutional:       Appearance: She is well-developed.   HENT:      Head: Normocephalic.   Eyes:      Pupils: Pupils are equal, round, and reactive to light.   Cardiovascular:      Rate and Rhythm: Normal rate and regular rhythm.      Heart sounds: Normal heart sounds.   Pulmonary:      Effort: Pulmonary effort is normal.      Breath sounds: Normal breath sounds.   Abdominal:      General: Bowel sounds are normal.      Palpations: Abdomen is soft.   Musculoskeletal:         General: Normal range of motion.      Cervical back: Normal range of motion and neck supple.   Skin:     General: Skin is warm and dry.   Neurological:      Mental Status: She is alert and oriented to person, place, and time.   Psychiatric:         Behavior: Behavior normal.       Urinalysis:  Normal    Assessment/Plan:   1. Stress incontinence:  Will  switch patient from oxybutynin 5 mg 3 times a day to oxybutynin XL 15 mg daily.    3. Interstitial cystitis:  Patient will restart her IC diet and restarted more strictly.  Will start patient on IC 2 caps.    Patient has had success with DMSO in the past but would like to start with IC caps 1st.  Patient continues to not want to proceed with any surgery for her leakage due to IC concerns.    Patient follow-up in 2 months for re-evaluation, sooner if needed.  If no improvement will discuss other options for IC.    Problem List Items Addressed This Visit    None     Visit Diagnoses     Interstitial cystitis    -  Primary    Relevant Medications    basilio-m.blue-s.phos-phsal-hyo (URIBEL) 118-10-40.8-36 mg Cap    Other Relevant Orders    POCT Urinalysis (w/Micro Option)    Stress incontinence        Relevant Medications    oxybutynin (DITROPAN XL) 15 MG TR24    Other Relevant Orders    POCT Urinalysis (w/Micro Option)

## 2022-04-11 ENCOUNTER — PATIENT MESSAGE (OUTPATIENT)
Dept: OBSTETRICS AND GYNECOLOGY | Facility: CLINIC | Age: 42
End: 2022-04-11
Payer: COMMERCIAL

## 2022-04-20 ENCOUNTER — OFFICE VISIT (OUTPATIENT)
Dept: UROLOGY | Facility: CLINIC | Age: 42
End: 2022-04-20
Payer: COMMERCIAL

## 2022-04-20 ENCOUNTER — TELEPHONE (OUTPATIENT)
Dept: UROLOGY | Facility: CLINIC | Age: 42
End: 2022-04-20

## 2022-04-20 VITALS
HEART RATE: 92 BPM | WEIGHT: 175 LBS | SYSTOLIC BLOOD PRESSURE: 108 MMHG | TEMPERATURE: 98 F | RESPIRATION RATE: 16 BRPM | BODY MASS INDEX: 35.28 KG/M2 | DIASTOLIC BLOOD PRESSURE: 78 MMHG | HEIGHT: 59 IN

## 2022-04-20 DIAGNOSIS — N30.10 INTERSTITIAL CYSTITIS: Primary | ICD-10-CM

## 2022-04-20 DIAGNOSIS — N39.3 STRESS INCONTINENCE: ICD-10-CM

## 2022-04-20 DIAGNOSIS — N32.89 BLADDER SPASMS: ICD-10-CM

## 2022-04-20 LAB — POC RESIDUAL URINE VOLUME: 0 ML (ref 0–100)

## 2022-04-20 PROCEDURE — 1159F MED LIST DOCD IN RCRD: CPT | Mod: CPTII,S$GLB,, | Performed by: NURSE PRACTITIONER

## 2022-04-20 PROCEDURE — 3008F PR BODY MASS INDEX (BMI) DOCUMENTED: ICD-10-PCS | Mod: CPTII,S$GLB,, | Performed by: NURSE PRACTITIONER

## 2022-04-20 PROCEDURE — 51798 POCT BLADDER SCAN: ICD-10-PCS | Mod: S$GLB,,, | Performed by: NURSE PRACTITIONER

## 2022-04-20 PROCEDURE — 3074F PR MOST RECENT SYSTOLIC BLOOD PRESSURE < 130 MM HG: ICD-10-PCS | Mod: CPTII,S$GLB,, | Performed by: NURSE PRACTITIONER

## 2022-04-20 PROCEDURE — 3008F BODY MASS INDEX DOCD: CPT | Mod: CPTII,S$GLB,, | Performed by: NURSE PRACTITIONER

## 2022-04-20 PROCEDURE — 99214 OFFICE O/P EST MOD 30 MIN: CPT | Mod: 25,S$GLB,, | Performed by: NURSE PRACTITIONER

## 2022-04-20 PROCEDURE — 1159F PR MEDICATION LIST DOCUMENTED IN MEDICAL RECORD: ICD-10-PCS | Mod: CPTII,S$GLB,, | Performed by: NURSE PRACTITIONER

## 2022-04-20 PROCEDURE — 3078F DIAST BP <80 MM HG: CPT | Mod: CPTII,S$GLB,, | Performed by: NURSE PRACTITIONER

## 2022-04-20 PROCEDURE — 3078F PR MOST RECENT DIASTOLIC BLOOD PRESSURE < 80 MM HG: ICD-10-PCS | Mod: CPTII,S$GLB,, | Performed by: NURSE PRACTITIONER

## 2022-04-20 PROCEDURE — 51798 US URINE CAPACITY MEASURE: CPT | Mod: S$GLB,,, | Performed by: NURSE PRACTITIONER

## 2022-04-20 PROCEDURE — 99214 PR OFFICE/OUTPT VISIT, EST, LEVL IV, 30-39 MIN: ICD-10-PCS | Mod: 25,S$GLB,, | Performed by: NURSE PRACTITIONER

## 2022-04-20 PROCEDURE — 3074F SYST BP LT 130 MM HG: CPT | Mod: CPTII,S$GLB,, | Performed by: NURSE PRACTITIONER

## 2022-04-20 PROCEDURE — 1160F RVW MEDS BY RX/DR IN RCRD: CPT | Mod: CPTII,S$GLB,, | Performed by: NURSE PRACTITIONER

## 2022-04-20 PROCEDURE — 96372 THER/PROPH/DIAG INJ SC/IM: CPT | Mod: S$GLB,,, | Performed by: NURSE PRACTITIONER

## 2022-04-20 PROCEDURE — 96372 PR INJECTION,THERAP/PROPH/DIAG2ST, IM OR SUBCUT: ICD-10-PCS | Mod: S$GLB,,, | Performed by: NURSE PRACTITIONER

## 2022-04-20 PROCEDURE — 1160F PR REVIEW ALL MEDS BY PRESCRIBER/CLIN PHARMACIST DOCUMENTED: ICD-10-PCS | Mod: CPTII,S$GLB,, | Performed by: NURSE PRACTITIONER

## 2022-04-20 RX ORDER — ONDANSETRON 4 MG/1
TABLET, FILM COATED ORAL
COMMUNITY
Start: 2022-01-14 | End: 2022-04-26

## 2022-04-20 RX ORDER — PANTOPRAZOLE SODIUM 40 MG/1
TABLET, DELAYED RELEASE ORAL
COMMUNITY
Start: 2022-02-16

## 2022-04-20 RX ORDER — KETOROLAC TROMETHAMINE 30 MG/ML
60 INJECTION, SOLUTION INTRAMUSCULAR; INTRAVENOUS ONCE
Status: DISCONTINUED | OUTPATIENT
Start: 2022-04-20 | End: 2022-04-20

## 2022-04-20 RX ORDER — KETOROLAC TROMETHAMINE 30 MG/ML
60 INJECTION, SOLUTION INTRAMUSCULAR; INTRAVENOUS ONCE
Status: COMPLETED | OUTPATIENT
Start: 2022-04-20 | End: 2022-04-20

## 2022-04-20 RX ORDER — METHOCARBAMOL 500 MG/1
500 TABLET, FILM COATED ORAL 3 TIMES DAILY PRN
Qty: 40 TABLET | Refills: 0 | Status: SHIPPED | OUTPATIENT
Start: 2022-04-20 | End: 2022-04-30

## 2022-04-20 RX ORDER — DICYCLOMINE HYDROCHLORIDE 20 MG/1
TABLET ORAL
COMMUNITY
Start: 2022-02-16 | End: 2022-04-20 | Stop reason: ALTCHOICE

## 2022-04-20 RX ADMIN — KETOROLAC TROMETHAMINE 60 MG: 30 INJECTION, SOLUTION INTRAMUSCULAR; INTRAVENOUS at 09:04

## 2022-04-20 NOTE — PROGRESS NOTES
Subjective:       Patient ID: Rosa Anguiano is a 41 y.o. female.    Chief Complaint: bladder spasms and Flank Pain      HPI: 41-year-old female, established patient, presents complaining bladder spasms.  Patient has a history of interstitial cystitis.  She states she follows an IC diet.  She is on IC 2 caps.  She is also on oxybutynin XL 15 mg daily, secondary to stress incontinence.    Patient states she has been having a flare up.  Patient states this started approximately 5 days ago after eating some spicy chicken fingers.  Patient states Uribel is not helping.  States IC 2 caps or an IC diet are not helping at this time.  Patient states her bladder spasms are severe.  Causes difficulty sleeping at night.    Patient has had DMSO treatments in the past which did provide some relief.    She denies any odor urine.  Denies any fever present body aches.  Denies any blood in urine.  No other urinary complaints this time.       Past Medical History:   Past Medical History:   Diagnosis Date    Acute pelvic pain, female     Breast pain     Cystitis     Diabetes mellitus     Dyspareunia, female     Endometriosis     Fibromyalgia     Interstitial cystitis     Irregular menstrual cycle     Leukorrhea     Osteoarthritis     Ovarian cyst     Pneumonia     YAMILET (stress urinary incontinence, female)     Vaginal yeast infection     Vulvitis        Past Surgical Historical:   Past Surgical History:   Procedure Laterality Date     SECTION      CHOLECYSTECTOMY      LAPAROSCOPY-ASSISTED VAGINAL HYSTERECTOMY      TUBAL LIGATION          Medications:   Medication List with Changes/Refills   New Medications    METHOCARBAMOL (ROBAXIN) 500 MG TAB    Take 1 tablet (500 mg total) by mouth 3 (three) times daily as needed (spasm).   Current Medications    BORIC ACID (PH-D) 600 MG VAGINAL SUPPOSITORY    Place 1 suppository (600 mg total) vaginally every evening. As directed    DULOXETINE (CYMBALTA) 30 MG CAPSULE         FLUCONAZOLE (DIFLUCAN) 100 MG TABLET    Take one pill po once a week for 6 months    HYDROXYZINE HCL (ATARAX) 25 MG TABLET    Take 25 mg by mouth.    IBREXAFUNGERP (BREXAFEMME) 150 MG TAB    Take 300 mg by mouth 2 (two) times a day.    METHEN-M.BLUE-S.PHOS-PHSAL-HYO (URIBEL) 118-10-40.8-36 MG CAP    TAKE 1 CAPSULE BY MOUTH THREE TIMES DAILY AS NEEDED FOR DYSURIA/BLADDER SPASMS    MONTELUKAST (SINGULAIR) 10 MG TABLET        ONDANSETRON (ZOFRAN) 4 MG TABLET    TAKE 1 TABLET BY MOUTH EVERY EIGHT HOURS AS NEEDED FOR NAUSEA    OXYBUTYNIN (DITROPAN XL) 15 MG TR24    Take 1 tablet (15 mg total) by mouth once daily.    OZEMPIC 0.25 MG OR 0.5 MG(2 MG/1.5 ML) PEN INJECTOR        PANTOPRAZOLE (PROTONIX) 40 MG TABLET        PHENAZOPYRIDINE (URISTAT ULTRA) 99.5 MG TAB    Take 1 tablet by mouth 3 (three) times daily as needed (dysuria).    PREGABALIN (LYRICA) 150 MG CAPSULE    Take 150 mg by mouth 2 (two) times daily.    PROAIR HFA 90 MCG/ACTUATION INHALER    INL 2 PFS PO Q 4 H    ROPINIROLE (REQUIP) 1 MG TABLET        TRAZODONE (DESYREL) 50 MG TABLET       Discontinued Medications    DICYCLOMINE (BENTYL) 20 MG TABLET        OMEPRAZOLE (PRILOSEC) 40 MG CAPSULE    Take 40 mg by mouth every morning.        Past Social History:   Social History     Socioeconomic History    Marital status:    Tobacco Use    Smoking status: Never Smoker    Smokeless tobacco: Never Used   Substance and Sexual Activity    Alcohol use: Not Currently    Drug use: Never    Sexual activity: Yes     Partners: Male     Birth control/protection: See Surgical Hx     Comment: Hysterectomy       Allergies:   Review of patient's allergies indicates:   Allergen Reactions    Trulicity [dulaglutide]      Vomiting        Family History:   Family History   Problem Relation Age of Onset    Colon cancer Father 60    Prostate cancer Father 60    Breast cancer Sister 66    Ovarian cancer Neg Hx     Uterine cancer Neg Hx     Melanoma Neg Hx      Pancreatic cancer Neg Hx         Review of Systems:  Review of Systems   Constitutional: Negative for activity change and appetite change.   HENT: Negative for congestion and dental problem.    Respiratory: Negative for chest tightness and shortness of breath.    Cardiovascular: Negative for chest pain.   Gastrointestinal: Negative for abdominal distention and abdominal pain.   Genitourinary: Positive for pelvic pain. Negative for decreased urine volume, difficulty urinating, dyspareunia, dysuria, enuresis, flank pain, frequency, genital sores, hematuria and urgency.   Musculoskeletal: Negative for back pain and neck pain.   Allergic/Immunologic: Negative for immunocompromised state.   Neurological: Negative for dizziness.   Hematological: Negative for adenopathy.   Psychiatric/Behavioral: Negative for agitation, behavioral problems and confusion.       Physical Exam:  Physical Exam  Vitals and nursing note reviewed.   Constitutional:       Appearance: She is well-developed.   HENT:      Head: Normocephalic.   Eyes:      Pupils: Pupils are equal, round, and reactive to light.   Cardiovascular:      Rate and Rhythm: Normal rate and regular rhythm.      Heart sounds: Normal heart sounds.   Pulmonary:      Effort: Pulmonary effort is normal.      Breath sounds: Normal breath sounds.   Abdominal:      General: Bowel sounds are normal.      Palpations: Abdomen is soft.   Musculoskeletal:         General: Normal range of motion.      Cervical back: Normal range of motion and neck supple.   Skin:     General: Skin is warm and dry.   Neurological:      Mental Status: She is alert and oriented to person, place, and time.   Psychiatric:         Behavior: Behavior normal.       Urinalysis:  Normal  Bladder scan:  0 cc    Assessment/Plan:   1. Interstitial cystitis:  Patient is having flare-up due to dietary factors.  She will continue IC 2 caps.  She will restart her IC diet.  Patient would like to repeat DMSO due to previous  success.    2. Bladder spasms:  Patient continue Uribel as directed.  Will prescribe patient has some Robaxin 500 mg 3 times a day as needed.    4. Stress incontinence:  Patient will continue oxybutynin XL 15 mg daily as directed.    Follow-up to arrange post DMSO treatment.  Problem List Items Addressed This Visit    None     Visit Diagnoses     Interstitial cystitis    -  Primary    Relevant Orders    POCT Urinalysis (w/Micro Option)    POCT Bladder Scan    Bladder spasms        Relevant Medications    ketorolac injection 60 mg (Start on 4/20/2022 10:30 AM)    methocarbamoL (ROBAXIN) 500 MG Tab    Other Relevant Orders    POCT Urinalysis (w/Micro Option)    POCT Bladder Scan    Stress incontinence

## 2022-04-20 NOTE — PROGRESS NOTES
Toradol given to Right upper outer hip per order and advised to sit in lobby for 15 minutes to monitor for reaction. Verbalized understanding. Patient verbalized that she is wanting the DMSO treatment explained that will get with  on treatments. MC LPN

## 2022-04-20 NOTE — TELEPHONE ENCOUNTER
Calling to schedule DMSO treatments for a couple weeks out. Pt vm is full message could not be left.

## 2022-04-26 ENCOUNTER — OFFICE VISIT (OUTPATIENT)
Dept: OBSTETRICS AND GYNECOLOGY | Facility: CLINIC | Age: 42
End: 2022-04-26
Payer: COMMERCIAL

## 2022-04-26 VITALS
DIASTOLIC BLOOD PRESSURE: 78 MMHG | HEART RATE: 89 BPM | BODY MASS INDEX: 34.88 KG/M2 | HEIGHT: 59 IN | WEIGHT: 173 LBS | SYSTOLIC BLOOD PRESSURE: 124 MMHG

## 2022-04-26 DIAGNOSIS — E11.9 TYPE 2 DIABETES MELLITUS WITHOUT COMPLICATION, WITHOUT LONG-TERM CURRENT USE OF INSULIN: ICD-10-CM

## 2022-04-26 DIAGNOSIS — N89.8 VAGINAL LEUKORRHEA: Primary | ICD-10-CM

## 2022-04-26 PROCEDURE — 99213 OFFICE O/P EST LOW 20 MIN: CPT | Mod: S$GLB,,, | Performed by: NURSE PRACTITIONER

## 2022-04-26 PROCEDURE — 1159F PR MEDICATION LIST DOCUMENTED IN MEDICAL RECORD: ICD-10-PCS | Mod: CPTII,S$GLB,, | Performed by: NURSE PRACTITIONER

## 2022-04-26 PROCEDURE — 1160F PR REVIEW ALL MEDS BY PRESCRIBER/CLIN PHARMACIST DOCUMENTED: ICD-10-PCS | Mod: CPTII,S$GLB,, | Performed by: NURSE PRACTITIONER

## 2022-04-26 PROCEDURE — 3074F SYST BP LT 130 MM HG: CPT | Mod: CPTII,S$GLB,, | Performed by: NURSE PRACTITIONER

## 2022-04-26 PROCEDURE — 3078F DIAST BP <80 MM HG: CPT | Mod: CPTII,S$GLB,, | Performed by: NURSE PRACTITIONER

## 2022-04-26 PROCEDURE — 3008F PR BODY MASS INDEX (BMI) DOCUMENTED: ICD-10-PCS | Mod: CPTII,S$GLB,, | Performed by: NURSE PRACTITIONER

## 2022-04-26 PROCEDURE — 1160F RVW MEDS BY RX/DR IN RCRD: CPT | Mod: CPTII,S$GLB,, | Performed by: NURSE PRACTITIONER

## 2022-04-26 PROCEDURE — 99213 PR OFFICE/OUTPT VISIT, EST, LEVL III, 20-29 MIN: ICD-10-PCS | Mod: S$GLB,,, | Performed by: NURSE PRACTITIONER

## 2022-04-26 PROCEDURE — 1159F MED LIST DOCD IN RCRD: CPT | Mod: CPTII,S$GLB,, | Performed by: NURSE PRACTITIONER

## 2022-04-26 PROCEDURE — 3008F BODY MASS INDEX DOCD: CPT | Mod: CPTII,S$GLB,, | Performed by: NURSE PRACTITIONER

## 2022-04-26 PROCEDURE — 3074F PR MOST RECENT SYSTOLIC BLOOD PRESSURE < 130 MM HG: ICD-10-PCS | Mod: CPTII,S$GLB,, | Performed by: NURSE PRACTITIONER

## 2022-04-26 PROCEDURE — 3078F PR MOST RECENT DIASTOLIC BLOOD PRESSURE < 80 MM HG: ICD-10-PCS | Mod: CPTII,S$GLB,, | Performed by: NURSE PRACTITIONER

## 2022-04-26 RX ORDER — DULOXETIN HYDROCHLORIDE 60 MG/1
CAPSULE, DELAYED RELEASE ORAL
COMMUNITY
Start: 2022-04-20 | End: 2022-06-01

## 2022-04-26 RX ORDER — SEMAGLUTIDE 1.34 MG/ML
INJECTION, SOLUTION SUBCUTANEOUS
COMMUNITY
Start: 2022-04-20 | End: 2022-06-17

## 2022-04-26 RX ORDER — FLUCONAZOLE 150 MG/1
150 TABLET ORAL EVERY OTHER DAY
Qty: 2 TABLET | Refills: 0 | Status: SHIPPED | OUTPATIENT
Start: 2022-04-26 | End: 2022-04-29

## 2022-04-26 NOTE — PROGRESS NOTES
"  Subjective:       Patient ID: Rosa Anguiano is a 41 y.o. female.    Chief Complaint:  Vaginitis (Follow up - Glabrata. Pt states once she stops taking the Diflucan the symptoms reoccur. )      History of Present Illness  HPI  FU glabrata reports that she has had relief from the yeast inf over the last 5 mos but has recently been on abx for respiratory issues. sje reports that she is having a DC with a musky odor and vaginal irritation    Outpatient Medications Marked as Taking for the 4/26/22 encounter (Office Visit) with Marie Everett NP   Medication Sig Dispense Refill    boric acid (PH-D) 600 mg vaginal suppository Place 1 suppository (600 mg total) vaginally every evening. As directed 30 suppository 3    DULoxetine (CYMBALTA) 60 MG capsule       methen-m.blue-s.phos-phsal-hyo (URIBEL) 118-10-40.8-36 mg Cap TAKE 1 CAPSULE BY MOUTH THREE TIMES DAILY AS NEEDED FOR DYSURIA/BLADDER SPASMS 30 capsule 0    methocarbamoL (ROBAXIN) 500 MG Tab Take 1 tablet (500 mg total) by mouth 3 (three) times daily as needed (spasm). 40 tablet 0    oxybutynin (DITROPAN XL) 15 MG TR24 Take 1 tablet (15 mg total) by mouth once daily. 30 tablet 11    OZEMPIC 1 mg/dose (4 mg/3 mL)       pantoprazole (PROTONIX) 40 MG tablet       pregabalin (LYRICA) 150 MG capsule Take 150 mg by mouth 2 (two) times daily.      PROAIR HFA 90 mcg/actuation inhaler INL 2 PFS PO Q 4 H       Vitals:    04/26/22 0831   BP: 124/78   Pulse: 89   Weight: 78.5 kg (173 lb)   Height: 4' 11" (1.499 m)     Past Medical History:   Diagnosis Date    Acute pelvic pain, female     Breast pain     Cystitis     Diabetes mellitus     Dyspareunia, female     Endometriosis     Fibromyalgia     Interstitial cystitis     Irregular menstrual cycle     Leukorrhea     Osteoarthritis     Ovarian cyst     Pneumonia     YAMILET (stress urinary incontinence, female)     Vaginal yeast infection     Vulvitis      Past Surgical History:   Procedure Laterality Date "     SECTION      CHOLECYSTECTOMY      LAPAROSCOPY-ASSISTED VAGINAL HYSTERECTOMY      TUBAL LIGATION           GYN & OB History  No LMP recorded. Patient has had a hysterectomy.   Date of Last Pap: No result found    OB History    Para Term  AB Living   7 4     3     SAB IAB Ectopic Multiple Live Births   2   1          # Outcome Date GA Lbr Ward/2nd Weight Sex Delivery Anes PTL Lv   7 Ectopic            6 SAB            5 SAB            4 Para      CS-Unspec      3 Para      CS-Unspec      2 Para      CS-Unspec      1 Para      CS-Unspec          Review of Systems  Review of Systems   Constitutional: Negative for activity change, appetite change, chills, fatigue and fever.   HENT: Negative for nasal congestion and tinnitus.    Eyes: Negative for visual disturbance.   Respiratory: Negative for cough and shortness of breath.    Cardiovascular: Negative for chest pain and palpitations.   Gastrointestinal: Negative for abdominal pain, bloating, blood in stool, constipation, nausea and vomiting.   Endocrine: Negative for diabetes, hair loss and hot flashes.   Genitourinary: Positive for vaginal discharge and vaginal odor. Negative for bladder incontinence, decreased libido, dysmenorrhea, dyspareunia, dysuria, flank pain, frequency, genital sores, hematuria, hot flashes, menorrhagia, menstrual problem, pelvic pain, urgency, vaginal bleeding, vaginal pain, urinary incontinence, postcoital bleeding, postmenopausal bleeding and vaginal dryness.   Musculoskeletal: Negative for arthralgias, back pain, leg pain and myalgias.   Integumentary:  Negative for rash, acne, hair changes, mole/lesion, breast mass, nipple discharge, breast skin changes and breast tenderness.   Neurological: Negative for vertigo, syncope, numbness and headaches.   Hematological: Does not bruise/bleed easily.   Psychiatric/Behavioral: Negative for depression and sleep disturbance. The patient is not nervous/anxious.    Breast:  Negative for asymmetry, lump, mass, mastodynia, nipple discharge, skin changes and tenderness          Objective:    Physical Exam:   Constitutional: She appears well-developed and well-nourished.    HENT:   Nose: No epistaxis.     Neck: No thyroid mass and no thyromegaly present.     Pulmonary/Chest: Effort normal and breath sounds normal. Chest wall is not dull to percussion. She exhibits no mass, no tenderness, no bony tenderness, no laceration, no crepitus, no edema, no deformity, no swelling and no retraction. Right breast exhibits no inverted nipple, no mass, no nipple discharge, no skin change, no tenderness, presence, no bleeding and no swelling. Left breast exhibits no inverted nipple, no mass, no nipple discharge, no skin change, no tenderness, presence, no bleeding and no swelling. Breasts are symmetrical.        Abdominal: Soft. Bowel sounds are normal. She exhibits no distension. There is no abdominal tenderness. Hernia confirmed negative in the right inguinal area and confirmed negative in the left inguinal area.     Genitourinary:    Rectum normal.      Pelvic exam was performed with patient supine.   Labial bartholins normal.There is no rash, tenderness, lesion or injury on the right labia. There is no rash, tenderness, lesion or injury on the left labia. Right adnexum displays no mass, no tenderness and no fullness. Left adnexum displays no mass, no tenderness and no fullness. Vaginal cuff normal.  There is vaginal discharge (scant white) in the vagina. No erythema, tenderness, bleeding, rectocele, cystocele or unspecified prolapse of vaginal walls in the vagina.    No foreign body in the vagina.      No signs of injury in the vagina.   Cervix is absent.Uterus is absent.                Skin: Skin is warm and dry.    Psychiatric: She has a normal mood and affect. Her speech is normal and behavior is normal.          Assessment:        1. Vaginal leukorrhea    2. Type 2 diabetes mellitus without  complication, without long-term current use of insulin                Plan:      Vaginal leukorrhea  -     Fungus culture; Future; Expected date: 04/26/2022  -     Vaginosis Screen by DNA Probe; Future; Expected date: 04/26/2022  -     clindamycin phosphate (CLINDESSE) vaginal cream; Place vaginally once. for 1 dose  Dispense: 5 g; Refill: 0  -     fluconazole (DIFLUCAN) 150 MG Tab; Take 1 tablet (150 mg total) by mouth every other day. for 2 doses  Dispense: 2 tablet; Refill: 0    Type 2 diabetes mellitus without complication, without long-term current use of insulin  -     Ambulatory referral/consult to Family Practice; Future; Expected date: 05/03/2022      Restart the BAS     Follow up if symptoms worsen or fail to improve.

## 2022-04-27 ENCOUNTER — PATIENT MESSAGE (OUTPATIENT)
Dept: FAMILY MEDICINE | Facility: CLINIC | Age: 42
End: 2022-04-27
Payer: COMMERCIAL

## 2022-04-27 ENCOUNTER — PATIENT MESSAGE (OUTPATIENT)
Dept: OBSTETRICS AND GYNECOLOGY | Facility: CLINIC | Age: 42
End: 2022-04-27
Payer: COMMERCIAL

## 2022-04-27 LAB
CANDIDA GLABRATA DNA: NOT DETECTED
CANDIDA GROUP DNA: NOT DETECTED
CANDIDA KRUSEI DNA: NOT DETECTED
TRICHOMONAS DNA: NOT DETECTED
VAGINOSIS PANEL DNA: NOT DETECTED

## 2022-04-28 ENCOUNTER — TELEPHONE (OUTPATIENT)
Dept: FAMILY MEDICINE | Facility: CLINIC | Age: 42
End: 2022-04-28
Payer: COMMERCIAL

## 2022-04-28 NOTE — TELEPHONE ENCOUNTER
Attempted to contact patient @ [phone]. No answer. Unable to leave message.     Dr. Clark is out of the clinic for the day. No same day appointments available at this time.

## 2022-05-04 ENCOUNTER — TELEPHONE (OUTPATIENT)
Dept: FAMILY MEDICINE | Facility: CLINIC | Age: 42
End: 2022-05-04
Payer: COMMERCIAL

## 2022-05-04 NOTE — TELEPHONE ENCOUNTER
----- Message from Olivia Colón MA sent at 5/4/2022  3:31 PM CDT -----    ----- Message -----  From: Zahraa Marsh  Sent: 5/4/2022   3:14 PM CDT  To: Eduardo Lieberman ([facility]) Staff    Type:  Needs Medical Advice    Who Called: Rosa Anguiano    Symptoms (please be specific): -  How long has patient had these symptoms:  -  Pharmacy name and phone #:  -  Would the patient rather a call back or a response via [facility]?    Best Call Back Number: [phone]    Additional Information: pt is needing to reschedule her appt on 5/19/22 ( epic won't let me reschedule, says that the referral is invalid) please call to get pt rescheduled, she has another appt on this day as well, the referral came from Marie Boothe

## 2022-05-09 ENCOUNTER — CLINICAL SUPPORT (OUTPATIENT)
Dept: UROLOGY | Facility: CLINIC | Age: 42
End: 2022-05-09
Payer: COMMERCIAL

## 2022-05-09 ENCOUNTER — TELEPHONE (OUTPATIENT)
Dept: FAMILY MEDICINE | Facility: CLINIC | Age: 42
End: 2022-05-09
Payer: COMMERCIAL

## 2022-05-09 DIAGNOSIS — N30.10 INTERSTITIAL CYSTITIS: Primary | ICD-10-CM

## 2022-05-09 DIAGNOSIS — N30.10 INTERSTITIAL CYSTITIS: ICD-10-CM

## 2022-05-09 PROCEDURE — 51700 PR IRRIGATION, BLADDER: ICD-10-PCS | Mod: S$GLB,,, | Performed by: NURSE PRACTITIONER

## 2022-05-09 PROCEDURE — 51700 IRRIGATION OF BLADDER: CPT | Mod: S$GLB,,, | Performed by: NURSE PRACTITIONER

## 2022-05-09 RX ORDER — METHENAMINE, SODIUM PHOSPHATE, MONOBASIC, MONOHYDRATE, PHENYL SALICYLATE, METHYLENE BLUE, AND HYOSCYAMINE SULFATE 118; 40.8; 36; 10; .12 MG/1; MG/1; MG/1; MG/1; MG/1
CAPSULE ORAL
Qty: 30 CAPSULE | Refills: 0 | Status: SHIPPED | OUTPATIENT
Start: 2022-05-09 | End: 2022-06-17 | Stop reason: SDUPTHER

## 2022-05-09 RX ORDER — HEPARIN SODIUM 1000 [USP'U]/ML
1000 INJECTION, SOLUTION INTRAVENOUS; SUBCUTANEOUS
Status: COMPLETED | OUTPATIENT
Start: 2022-05-09 | End: 2022-05-09

## 2022-05-09 RX ORDER — DEXAMETHASONE SODIUM PHOSPHATE 4 MG/ML
8 INJECTION, SOLUTION INTRA-ARTICULAR; INTRALESIONAL; INTRAMUSCULAR; INTRAVENOUS; SOFT TISSUE
Status: COMPLETED | OUTPATIENT
Start: 2022-05-09 | End: 2022-05-09

## 2022-05-09 RX ADMIN — HEPARIN SODIUM 1000 UNITS: 1000 INJECTION, SOLUTION INTRAVENOUS; SUBCUTANEOUS at 09:05

## 2022-05-09 RX ADMIN — DEXAMETHASONE SODIUM PHOSPHATE 8 MG: 4 INJECTION, SOLUTION INTRA-ARTICULAR; INTRALESIONAL; INTRAMUSCULAR; INTRAVENOUS; SOFT TISSUE at 09:05

## 2022-05-09 NOTE — TELEPHONE ENCOUNTER
----- Message from Ninfa Stover LPN sent at 5/9/2022  2:58 PM CDT -----  Contact: Patient    ----- Message -----  From: Juan Jose Juarez  Sent: 5/9/2022   2:57 PM CDT  To: Eduardo Lieberman ([facility]) Staff    2ND Request      Please call patient to reschedule her new pt appointment        Call back #  [phone]

## 2022-05-09 NOTE — PROGRESS NOTES
Treatment #1. Cleansed urethra with Hibiclense. Inserted 14 American red catheter into patient's bladder and drained the bladder. Step #1 (Heparin 1,000u/ml and Dexamethasone 4mg/ml x2) instilled (slow push). Catheter held in place with plug. Patient was instructed to hold and rotate every 10 minutes for 30 minutes. After draining step #1 from bladder step #2 (DMSO irrigation 50ml was instilled (slow push). Catheter was discontinued. Patient was instructed to hold for 20 minutes then void. Patient tolerated procedure without complaints. Pt verbalized understanding of post instructions.

## 2022-05-16 ENCOUNTER — CLINICAL SUPPORT (OUTPATIENT)
Dept: UROLOGY | Facility: CLINIC | Age: 42
End: 2022-05-16
Payer: COMMERCIAL

## 2022-05-16 DIAGNOSIS — N30.10 INTERSTITIAL CYSTITIS: Primary | ICD-10-CM

## 2022-05-16 PROCEDURE — 51700 PR IRRIGATION, BLADDER: ICD-10-PCS | Mod: S$GLB,,, | Performed by: NURSE PRACTITIONER

## 2022-05-16 PROCEDURE — 51700 IRRIGATION OF BLADDER: CPT | Mod: S$GLB,,, | Performed by: NURSE PRACTITIONER

## 2022-05-16 RX ORDER — HEPARIN SODIUM 1000 [USP'U]/ML
1000 INJECTION, SOLUTION INTRAVENOUS; SUBCUTANEOUS
Status: COMPLETED | OUTPATIENT
Start: 2022-05-16 | End: 2022-05-16

## 2022-05-16 RX ORDER — DEXAMETHASONE SODIUM PHOSPHATE 4 MG/ML
8 INJECTION, SOLUTION INTRA-ARTICULAR; INTRALESIONAL; INTRAMUSCULAR; INTRAVENOUS; SOFT TISSUE
Status: COMPLETED | OUTPATIENT
Start: 2022-05-16 | End: 2022-05-16

## 2022-05-16 RX ADMIN — DEXAMETHASONE SODIUM PHOSPHATE 8 MG: 4 INJECTION, SOLUTION INTRA-ARTICULAR; INTRALESIONAL; INTRAMUSCULAR; INTRAVENOUS; SOFT TISSUE at 09:05

## 2022-05-16 RX ADMIN — HEPARIN SODIUM 1000 UNITS: 1000 INJECTION, SOLUTION INTRAVENOUS; SUBCUTANEOUS at 09:05

## 2022-05-16 NOTE — PROGRESS NOTES
Treatment #2. Cleansed urethra with Hibiclense. Inserted 14 [place] red catheter into patient's bladder and drained the bladder. Step #1 (Heparin 1,000u/ml and Dexamethasone 4mg/ml x2) instilled (slow push). Catheter held in place with plug. Patient was instructed to hold and rotate every 10 minutes for 30 minutes. After draining step #1 from bladder step #2 (DMSO irrigation 50ml was instilled (slow push). Catheter was discontinued. Patient was instructed to hold for 20 minutes then void. Patient tolerated procedure without complaints. Pt verbalized understanding of post instructions.

## 2022-05-26 ENCOUNTER — TELEPHONE (OUTPATIENT)
Dept: UROLOGY | Facility: CLINIC | Age: 42
End: 2022-05-26
Payer: COMMERCIAL

## 2022-05-26 NOTE — TELEPHONE ENCOUNTER
Left  for pt explaining that DMSO medication we are using now only last for so many days and due to her missing her appt yesterday I was making sure she would be here Monday morning at her normal time to have treatment done prior to medication expiring.

## 2022-05-27 ENCOUNTER — CLINICAL SUPPORT (OUTPATIENT)
Dept: UROLOGY | Facility: CLINIC | Age: 42
End: 2022-05-27
Payer: COMMERCIAL

## 2022-05-27 DIAGNOSIS — N30.10 INTERSTITIAL CYSTITIS: Primary | ICD-10-CM

## 2022-05-27 PROCEDURE — 96372 PR INJECTION,THERAP/PROPH/DIAG2ST, IM OR SUBCUT: ICD-10-PCS | Mod: S$GLB,,, | Performed by: NURSE PRACTITIONER

## 2022-05-27 PROCEDURE — 96372 THER/PROPH/DIAG INJ SC/IM: CPT | Mod: S$GLB,,, | Performed by: NURSE PRACTITIONER

## 2022-05-27 NOTE — PROGRESS NOTES
Treatment #3. Cleansed urethra with Hibiclense. Inserted 14 [place] red catheter into patient's bladder and drained the bladder. Step #1 (prepackaged Heparin 1,000u/ml and Dexamethasone 8mg) lot # [id number] exp 6/2/22 instilled (slow push). Catheter held in place with plug. Patient was instructed to hold and rotate every 10 minutes for 30 minutes. After draining step #1 from bladder step #2 (DMSO irrigation 50ml was instilled (slow push). Catheter was discontinued. Patient was instructed to hold for 20 minutes then void. Patient tolerated procedure without complaints. Pt verbalized understanding of post instructions.

## 2022-06-01 ENCOUNTER — OFFICE VISIT (OUTPATIENT)
Dept: FAMILY MEDICINE | Facility: CLINIC | Age: 42
End: 2022-06-01
Payer: COMMERCIAL

## 2022-06-01 VITALS
HEART RATE: 91 BPM | SYSTOLIC BLOOD PRESSURE: 127 MMHG | WEIGHT: 169.88 LBS | HEIGHT: 59 IN | BODY MASS INDEX: 34.25 KG/M2 | OXYGEN SATURATION: 98 % | DIASTOLIC BLOOD PRESSURE: 80 MMHG

## 2022-06-01 DIAGNOSIS — E11.9 TYPE 2 DIABETES MELLITUS WITHOUT COMPLICATION, WITHOUT LONG-TERM CURRENT USE OF INSULIN: ICD-10-CM

## 2022-06-01 DIAGNOSIS — M79.7 FIBROMYALGIA: ICD-10-CM

## 2022-06-01 DIAGNOSIS — M05.79 RHEUMATOID ARTHRITIS INVOLVING MULTIPLE SITES WITH POSITIVE RHEUMATOID FACTOR: ICD-10-CM

## 2022-06-01 DIAGNOSIS — E66.09 CLASS 1 OBESITY DUE TO EXCESS CALORIES WITH SERIOUS COMORBIDITY AND BODY MASS INDEX (BMI) OF 34.0 TO 34.9 IN ADULT: ICD-10-CM

## 2022-06-01 DIAGNOSIS — R10.9 ABDOMINAL PAIN, UNSPECIFIED ABDOMINAL LOCATION: ICD-10-CM

## 2022-06-01 DIAGNOSIS — Z00.00 PREVENTATIVE HEALTH CARE: Primary | ICD-10-CM

## 2022-06-01 DIAGNOSIS — Z12.31 ENCOUNTER FOR SCREENING MAMMOGRAM FOR MALIGNANT NEOPLASM OF BREAST: ICD-10-CM

## 2022-06-01 PROBLEM — E66.811 CLASS 1 OBESITY DUE TO EXCESS CALORIES WITH SERIOUS COMORBIDITY AND BODY MASS INDEX (BMI) OF 34.0 TO 34.9 IN ADULT: Status: ACTIVE | Noted: 2022-06-01

## 2022-06-01 LAB
ALBUMIN SERPL BCP-MCNC: 4.4 G/DL (ref 3.5–5.2)
ALBUMIN/GLOB SERPL ELPH: 1.4 {RATIO} (ref 1–2.7)
ALP SERPL-CCNC: 91 U/L (ref 35–105)
ALT SERPL W P-5'-P-CCNC: 28 U/L (ref 0–33)
ANION GAP SERPL CALC-SCNC: 9 MMOL/L (ref 8–17)
AST SERPL-CCNC: 16 U/L (ref 0–32)
BILIRUB SERPL-MCNC: 0.4 MG/DL (ref 0–1.2)
BUN SERPL-MCNC: 9.9 MG/DL (ref 6–20)
BUN/CREAT RATIO: 18.7 (ref 6–20)
CALCIUM SERPL-MCNC: 9.1 MG/DL (ref 8.6–10.2)
CHLORIDE SERPL-SCNC: 104 MMOL/L (ref 98–107)
CO2 SERPL-SCNC: 25 MMOL/L (ref 22–29)
CREAT SERPL-MCNC: 0.5 MG/DL (ref 0.5–0.9)
GFR ESTIMATION: 126.51 >60.00
GLOBULIN: 3.2 (ref 1.5–4.5)
GLUCOSE SERPL-MCNC: 103 MG/DL (ref 74–106)
HBA1C MFR BLD: 6.6 % (ref 4–6)
POTASSIUM SERPL-SCNC: 3.8 MMOL/L (ref 3.5–5.1)
PROT SERPL-MCNC: 7.6 G/DL (ref 6.4–8.3)
SODIUM BLD-SCNC: 138 MMOL/L (ref 136–145)

## 2022-06-01 PROCEDURE — 3079F PR MOST RECENT DIASTOLIC BLOOD PRESSURE 80-89 MM HG: ICD-10-PCS | Mod: CPTII,S$GLB,, | Performed by: FAMILY MEDICINE

## 2022-06-01 PROCEDURE — 1159F MED LIST DOCD IN RCRD: CPT | Mod: CPTII,S$GLB,, | Performed by: FAMILY MEDICINE

## 2022-06-01 PROCEDURE — 3074F PR MOST RECENT SYSTOLIC BLOOD PRESSURE < 130 MM HG: ICD-10-PCS | Mod: CPTII,S$GLB,, | Performed by: FAMILY MEDICINE

## 2022-06-01 PROCEDURE — 1159F PR MEDICATION LIST DOCUMENTED IN MEDICAL RECORD: ICD-10-PCS | Mod: CPTII,S$GLB,, | Performed by: FAMILY MEDICINE

## 2022-06-01 PROCEDURE — 99205 PR OFFICE/OUTPT VISIT, NEW, LEVL V, 60-74 MIN: ICD-10-PCS | Mod: S$GLB,,, | Performed by: FAMILY MEDICINE

## 2022-06-01 PROCEDURE — 1160F RVW MEDS BY RX/DR IN RCRD: CPT | Mod: CPTII,S$GLB,, | Performed by: FAMILY MEDICINE

## 2022-06-01 PROCEDURE — 3079F DIAST BP 80-89 MM HG: CPT | Mod: CPTII,S$GLB,, | Performed by: FAMILY MEDICINE

## 2022-06-01 PROCEDURE — 3074F SYST BP LT 130 MM HG: CPT | Mod: CPTII,S$GLB,, | Performed by: FAMILY MEDICINE

## 2022-06-01 PROCEDURE — 99205 OFFICE O/P NEW HI 60 MIN: CPT | Mod: S$GLB,,, | Performed by: FAMILY MEDICINE

## 2022-06-01 PROCEDURE — 3008F BODY MASS INDEX DOCD: CPT | Mod: CPTII,S$GLB,, | Performed by: FAMILY MEDICINE

## 2022-06-01 PROCEDURE — 3008F PR BODY MASS INDEX (BMI) DOCUMENTED: ICD-10-PCS | Mod: CPTII,S$GLB,, | Performed by: FAMILY MEDICINE

## 2022-06-01 PROCEDURE — 1160F PR REVIEW ALL MEDS BY PRESCRIBER/CLIN PHARMACIST DOCUMENTED: ICD-10-PCS | Mod: CPTII,S$GLB,, | Performed by: FAMILY MEDICINE

## 2022-06-01 RX ORDER — HYOSCYAMINE SULFATE 0.125 MG
125 TABLET ORAL EVERY 4 HOURS PRN
Qty: 90 TABLET | Refills: 0 | Status: SHIPPED | OUTPATIENT
Start: 2022-06-01 | End: 2023-03-30

## 2022-06-01 RX ORDER — METHOTREXATE 2.5 MG/1
TABLET ORAL
COMMUNITY
Start: 2022-05-23 | End: 2022-07-19 | Stop reason: ALTCHOICE

## 2022-06-01 RX ORDER — MELOXICAM 15 MG/1
15 TABLET ORAL DAILY
Qty: 30 TABLET | Refills: 0 | Status: SHIPPED | OUTPATIENT
Start: 2022-06-01 | End: 2022-07-26

## 2022-06-01 NOTE — PROGRESS NOTES
Subjective:       Patient ID: Rosa Anguiano is a 41 y.o. female.    Chief Complaint: Establish Care (Patient is here to get established and to check on her Diabetes)    HPI     Here to est care  Hx DM, RA, FM  DM - she sees Dr. Mancia and last a1c 6.5. she is only on ozempic  Pt reports she was originally dx'd with FM, later sought second opinion and has labs concerning for RA. She was referred to Dr. Wright and was recently started on mtx  She is also on lyrica which she takes bid  Is prescribed cymbalta but cannot take due to sedation  She reports generalized joint pain, and constant LUE pain  She is also being worked up for chronic abd pain, she is seeing Dr. Boles for this  Was started on ppi and doing well, was given carafate but could not tolerate  She is also being treated for IC with urology  Reports recurrent yeast infections, sees Marie WHEELER  Will discuss mmg referral next visit    Review of Systems   Constitutional: Negative for chills, diaphoresis, fatigue, fever and unexpected weight change.   HENT: Negative for nasal congestion, hearing loss, rhinorrhea, sinus pressure/congestion, sore throat, trouble swallowing and voice change.    Eyes: Negative for pain and visual disturbance.   Respiratory: Negative for cough, chest tightness, shortness of breath and wheezing.    Cardiovascular: Negative for chest pain, palpitations and leg swelling.   Gastrointestinal: Negative for abdominal pain, constipation, diarrhea, nausea and vomiting.   Endocrine: Negative for polydipsia and polyuria.   Genitourinary: Positive for vaginal pain. Negative for decreased urine volume, dysuria, flank pain, frequency, hematuria, pelvic pain, vaginal bleeding and vaginal discharge.   Musculoskeletal: Positive for arthralgias and myalgias. Negative for back pain and neck pain.   Integumentary:  Negative for color change, pallor and rash.   Neurological: Negative for dizziness, syncope, weakness, light-headedness and headaches.    Hematological: Negative for adenopathy.   Psychiatric/Behavioral: Negative for dysphoric mood and sleep disturbance. The patient is not nervous/anxious.          Objective:      Physical Exam  Vitals reviewed.   Constitutional:       General: She is not in acute distress.     Appearance: She is well-developed. She is not diaphoretic.   HENT:      Head: Normocephalic and atraumatic.      Right Ear: External ear normal.      Left Ear: External ear normal.      Nose: Nose normal.      Mouth/Throat:      Mouth: Mucous membranes are moist.      Pharynx: Oropharynx is clear. No posterior oropharyngeal erythema.   Eyes:      General: No scleral icterus.        Right eye: No discharge.         Left eye: No discharge.      Conjunctiva/sclera: Conjunctivae normal.      Pupils: Pupils are equal, round, and reactive to light.   Neck:      Thyroid: No thyromegaly.      Vascular: No JVD.   Cardiovascular:      Rate and Rhythm: Normal rate and regular rhythm.      Pulses: Normal pulses.      Heart sounds: Normal heart sounds. No murmur heard.    No friction rub. No gallop.   Pulmonary:      Effort: Pulmonary effort is normal. No respiratory distress.      Breath sounds: Normal breath sounds. No stridor. No wheezing or rales.   Abdominal:      General: Bowel sounds are normal. There is no distension.      Palpations: Abdomen is soft.      Tenderness: There is no abdominal tenderness.   Musculoskeletal:         General: No swelling or tenderness.      Cervical back: Normal range of motion and neck supple.      Right lower leg: No edema.      Left lower leg: No edema.   Lymphadenopathy:      Cervical: No cervical adenopathy.   Skin:     General: Skin is warm and dry.      Coloration: Skin is not pale.      Findings: No erythema or rash.   Neurological:      General: No focal deficit present.      Mental Status: She is alert and oriented to person, place, and time.   Psychiatric:         Mood and Affect: Mood normal.          Behavior: Behavior normal.         Assessment:       Problem List Items Addressed This Visit        Immunology/Multi System    Rheumatoid arthritis involving multiple sites with positive rheumatoid factor    Relevant Medications    meloxicam (MOBIC) 15 MG tablet       Endocrine    Class 1 obesity due to excess calories with serious comorbidity and body mass index (BMI) of 34.0 to 34.9 in adult    Type 2 diabetes mellitus, without long-term current use of insulin       Orthopedic    Fibromyalgia      Other Visit Diagnoses     Preventative health care    -  Primary    Relevant Orders    Hemoglobin A1C    Comprehensive Metabolic Panel    CBC Auto Differential    Lipid Panel    TSH w/reflex to FT4    Urinalysis    T4, Free    Abdominal pain, unspecified abdominal location        Relevant Medications    hyoscyamine (ANASPAZ,LEVSIN) 0.125 mg Tab    Encounter for screening mammogram for malignant neoplasm of breast        will plan to send next visit          Plan:       Preventative health care  -     Hemoglobin A1C; Future; Expected date: 06/02/2022  -     Comprehensive Metabolic Panel; Future; Expected date: 06/02/2022  -     CBC Auto Differential; Future; Expected date: 06/02/2022  -     Lipid Panel; Future; Expected date: 06/02/2022  -     TSH w/reflex to FT4; Future; Expected date: 06/02/2022  -     Urinalysis; Future; Expected date: 06/02/2022  -     T4, Free; Future; Expected date: 06/01/2022    Type 2 diabetes mellitus without complication, without long-term current use of insulin  -     Ambulatory referral/consult to Family Practice    Abdominal pain, unspecified abdominal location  -     hyoscyamine (ANASPAZ,LEVSIN) 0.125 mg Tab; Take 1 tablet (125 mcg total) by mouth every 4 (four) hours as needed (stomach pain).  Dispense: 90 tablet; Refill: 0    Rheumatoid arthritis involving multiple sites with positive rheumatoid factor  -     meloxicam (MOBIC) 15 MG tablet; Take 1 tablet (15 mg total) by mouth once daily.   Dispense: 30 tablet; Refill: 0    Fibromyalgia  Comments:  requesting records from Dr. hodges    Encounter for screening mammogram for malignant neoplasm of breast  Comments:  will plan to send next visit    Class 1 obesity due to excess calories with serious comorbidity and body mass index (BMI) of 34.0 to 34.9 in adult  Comments:  counseled on weight management

## 2022-06-08 ENCOUNTER — TELEPHONE (OUTPATIENT)
Dept: UROLOGY | Facility: CLINIC | Age: 42
End: 2022-06-08
Payer: COMMERCIAL

## 2022-06-08 NOTE — TELEPHONE ENCOUNTER
Patient rescheduled her appt for Friday @ 200pm.mc lpn       ----- Message from Pam Calzada sent at 6/8/2022  8:08 AM CDT -----  Regarding: appt access  Contact: Pt  Pt is calling to reschedule her nurse appt for today. Pt wants to come in on Friday 06/10/22. Please call back at [phone]//thank you acc

## 2022-06-10 ENCOUNTER — CLINICAL SUPPORT (OUTPATIENT)
Dept: UROLOGY | Facility: CLINIC | Age: 42
End: 2022-06-10
Payer: COMMERCIAL

## 2022-06-10 DIAGNOSIS — N30.10 INTERSTITIAL CYSTITIS: Primary | ICD-10-CM

## 2022-06-10 NOTE — PROGRESS NOTES
Pt arrived at office for DMSO treatment #4 at that time she advised she did not feel like the treatments were working and did not wish to continue. Pt did not receive treatment today and stated she would like an appointment to discuss further options with TITI Huerta NP. Pt already has a 3 month f/u scheduled on 7/25. There are no sooner appointments at this time.

## 2022-06-17 ENCOUNTER — OFFICE VISIT (OUTPATIENT)
Dept: UROLOGY | Facility: CLINIC | Age: 42
End: 2022-06-17
Payer: COMMERCIAL

## 2022-06-17 VITALS
HEIGHT: 59 IN | SYSTOLIC BLOOD PRESSURE: 106 MMHG | HEART RATE: 86 BPM | DIASTOLIC BLOOD PRESSURE: 71 MMHG | WEIGHT: 170 LBS | BODY MASS INDEX: 34.27 KG/M2

## 2022-06-17 DIAGNOSIS — N30.10 INTERSTITIAL CYSTITIS: Primary | ICD-10-CM

## 2022-06-17 DIAGNOSIS — R32 FEMALE INCONTINENCE: ICD-10-CM

## 2022-06-17 PROCEDURE — 3074F SYST BP LT 130 MM HG: CPT | Mod: CPTII,S$GLB,, | Performed by: NURSE PRACTITIONER

## 2022-06-17 PROCEDURE — 99214 PR OFFICE/OUTPT VISIT, EST, LEVL IV, 30-39 MIN: ICD-10-PCS | Mod: S$GLB,,, | Performed by: NURSE PRACTITIONER

## 2022-06-17 PROCEDURE — 3074F PR MOST RECENT SYSTOLIC BLOOD PRESSURE < 130 MM HG: ICD-10-PCS | Mod: CPTII,S$GLB,, | Performed by: NURSE PRACTITIONER

## 2022-06-17 PROCEDURE — 1160F PR REVIEW ALL MEDS BY PRESCRIBER/CLIN PHARMACIST DOCUMENTED: ICD-10-PCS | Mod: CPTII,S$GLB,, | Performed by: NURSE PRACTITIONER

## 2022-06-17 PROCEDURE — 1159F MED LIST DOCD IN RCRD: CPT | Mod: CPTII,S$GLB,, | Performed by: NURSE PRACTITIONER

## 2022-06-17 PROCEDURE — 99214 OFFICE O/P EST MOD 30 MIN: CPT | Mod: S$GLB,,, | Performed by: NURSE PRACTITIONER

## 2022-06-17 PROCEDURE — 3078F DIAST BP <80 MM HG: CPT | Mod: CPTII,S$GLB,, | Performed by: NURSE PRACTITIONER

## 2022-06-17 PROCEDURE — 1159F PR MEDICATION LIST DOCUMENTED IN MEDICAL RECORD: ICD-10-PCS | Mod: CPTII,S$GLB,, | Performed by: NURSE PRACTITIONER

## 2022-06-17 PROCEDURE — 3078F PR MOST RECENT DIASTOLIC BLOOD PRESSURE < 80 MM HG: ICD-10-PCS | Mod: CPTII,S$GLB,, | Performed by: NURSE PRACTITIONER

## 2022-06-17 PROCEDURE — 1160F RVW MEDS BY RX/DR IN RCRD: CPT | Mod: CPTII,S$GLB,, | Performed by: NURSE PRACTITIONER

## 2022-06-17 PROCEDURE — 3008F PR BODY MASS INDEX (BMI) DOCUMENTED: ICD-10-PCS | Mod: CPTII,S$GLB,, | Performed by: NURSE PRACTITIONER

## 2022-06-17 PROCEDURE — 3008F BODY MASS INDEX DOCD: CPT | Mod: CPTII,S$GLB,, | Performed by: NURSE PRACTITIONER

## 2022-06-17 RX ORDER — RIFAXIMIN 550 MG/1
TABLET ORAL
COMMUNITY
Start: 2022-05-13 | End: 2022-07-19 | Stop reason: ALTCHOICE

## 2022-06-17 RX ORDER — SEMAGLUTIDE 2.68 MG/ML
INJECTION, SOLUTION SUBCUTANEOUS
COMMUNITY
Start: 2022-06-01 | End: 2023-08-30

## 2022-06-17 RX ORDER — METHENAMINE, SODIUM PHOSPHATE, MONOBASIC, MONOHYDRATE, PHENYL SALICYLATE, METHYLENE BLUE, AND HYOSCYAMINE SULFATE 118; 40.8; 36; 10; .12 MG/1; MG/1; MG/1; MG/1; MG/1
CAPSULE ORAL
Qty: 30 CAPSULE | Refills: 0 | Status: SHIPPED | OUTPATIENT
Start: 2022-06-17 | End: 2022-10-13 | Stop reason: SDUPTHER

## 2022-06-17 RX ORDER — FAMOTIDINE 20 MG/1
TABLET, FILM COATED ORAL
COMMUNITY
Start: 2022-05-13 | End: 2023-05-01

## 2022-06-17 RX ORDER — CLINDAMYCIN PHOSPHATE 100 MG/5G
CREAM VAGINAL
COMMUNITY
Start: 2022-04-26 | End: 2022-07-19 | Stop reason: ALTCHOICE

## 2022-06-17 RX ORDER — SUCRALFATE 1 G/1
TABLET ORAL
COMMUNITY
Start: 2022-05-13 | End: 2022-07-19 | Stop reason: ALTCHOICE

## 2022-06-17 NOTE — PROGRESS NOTES
Subjective:       Patient ID: Rosa Anguiano is a 41 y.o. female.    Chief Complaint: Dysuria (Reports she hurts a little bit when voiding. Thinks it may be a uti. Out of oxybutynin and uribel/did DMSO and reports it was brutal and unhelpful.)      HPI: 41-year-old female, established patient, presents for follow-up.  Patient has history of interstitial cystitis.  Patient follows an IC diet.  She is currently on IC 2 caps.  She uses Uribel as needed.  She is also using oxybutynin to help with frequency and leakage.  The patient started DMSO treatments.  After 4 treatments patient discontinued treatment due to pain and difficulty tolerating the procedure.    Patient is complaining of some burning at this time.    She states her IC is still an issue despite follow an IC diet, use IC 2 caps, and Uribel as needed.    Patient denies any blood in urine.  Denies any odor urine denies any fever or body aches.  No other urinary complaints at this time.       Past Medical History:   Past Medical History:   Diagnosis Date    Acute pelvic pain, female     Breast pain     Cystitis     Diabetes mellitus     Dyspareunia, female     Endometriosis     Fibromyalgia     GERD (gastroesophageal reflux disease)     IBS (irritable bowel syndrome)     Interstitial cystitis     Irregular menstrual cycle     Leukorrhea     Osteoarthritis     Ovarian cyst     Pneumonia     YAMILET (stress urinary incontinence, female)     Vaginal yeast infection     Vulvitis        Past Surgical Historical:   Past Surgical History:   Procedure Laterality Date     SECTION      CHOLECYSTECTOMY      LAPAROSCOPY-ASSISTED VAGINAL HYSTERECTOMY      TUBAL LIGATION          Medications:   Medication List with Changes/Refills   Current Medications    BORIC ACID (PH-D) 600 MG VAGINAL SUPPOSITORY    Place 1 suppository (600 mg total) vaginally every evening. As directed    CLINDESSE VAGINAL CREAM        FAMOTIDINE (PEPCID) 20 MG TABLET    TAKE 1  TABLET BY MOUTH TWICE DAILY AS NEEDED FOR STOMACH ACID OR REFLUX OR HEARTBURN    HYOSCYAMINE (ANASPAZ,LEVSIN) 0.125 MG TAB    Take 1 tablet (125 mcg total) by mouth every 4 (four) hours as needed (stomach pain).    MELOXICAM (MOBIC) 15 MG TABLET    Take 1 tablet (15 mg total) by mouth once daily.    METHOTREXATE 2.5 MG TAB        OXYBUTYNIN (DITROPAN XL) 15 MG TR24    Take 1 tablet (15 mg total) by mouth once daily.    OZEMPIC 2 MG/DOSE (8 MG/3 ML) PNIJ        PANTOPRAZOLE (PROTONIX) 40 MG TABLET        PREGABALIN (LYRICA) 150 MG CAPSULE    Take 150 mg by mouth 2 (two) times daily.    PROAIR HFA 90 MCG/ACTUATION INHALER    INL 2 PFS PO Q 4 H    SUCRALFATE (CARAFATE) 1 GRAM TABLET    TAKE 1 TABLET BY MOUTH THREE TIMES DAILY BEFORE MEALS AS DIRECTED    XIFAXAN 550 MG TAB       Changed and/or Refilled Medications    Modified Medication Previous Medication    METHEN-M.BLUE-S.PHOS-PHSAL-HYO (URIBEL) 118-10-40.8-36 MG CAP methen-m.blue-s.phos-phsal-hyo (URIBEL) 118-10-40.8-36 mg Cap       TAKE 1 CAPSULE BY MOUTH THREE TIMES DAILY AS NEEDED FOR DYSURIA/BLADDER SPASMS    TAKE 1 CAPSULE BY MOUTH THREE TIMES DAILY AS NEEDED FOR DYSURIA/BLADDER SPASMS   Discontinued Medications    OZEMPIC 1 MG/DOSE (4 MG/3 ML)            Past Social History:   Social History     Socioeconomic History    Marital status:    Tobacco Use    Smoking status: Never Smoker    Smokeless tobacco: Never Used   Substance and Sexual Activity    Alcohol use: Not Currently    Drug use: Never    Sexual activity: Yes     Partners: Male     Birth control/protection: See Surgical Hx     Comment: Hysterectomy       Allergies:   Review of patient's allergies indicates:   Allergen Reactions    Trulicity [dulaglutide]      Vomiting        Family History:   Family History   Problem Relation Age of Onset    Colon cancer Father 60    Prostate cancer Father 60    Breast cancer Sister 66    Ovarian cancer Neg Hx     Uterine cancer Neg Hx     Melanoma  Neg Hx     Pancreatic cancer Neg Hx         Review of Systems:  Review of Systems   Constitutional: Negative for activity change and appetite change.   HENT: Negative for congestion and dental problem.    Respiratory: Negative for chest tightness and shortness of breath.    Cardiovascular: Negative for chest pain.   Gastrointestinal: Negative for abdominal distention and abdominal pain.   Genitourinary: Positive for dysuria and frequency. Negative for decreased urine volume, difficulty urinating, dyspareunia, enuresis, flank pain, genital sores, hematuria, pelvic pain and urgency.   Musculoskeletal: Negative for back pain and neck pain.   Allergic/Immunologic: Negative for immunocompromised state.   Neurological: Negative for dizziness.   Hematological: Negative for adenopathy.   Psychiatric/Behavioral: Negative for agitation, behavioral problems and confusion.       Physical Exam:  Physical Exam  Vitals and nursing note reviewed.   Constitutional:       Appearance: She is well-developed.   HENT:      Head: Normocephalic.   Eyes:      Pupils: Pupils are equal, round, and reactive to light.   Cardiovascular:      Rate and Rhythm: Normal rate and regular rhythm.      Heart sounds: Normal heart sounds.   Pulmonary:      Effort: Pulmonary effort is normal.      Breath sounds: Normal breath sounds.   Abdominal:      General: Bowel sounds are normal.      Palpations: Abdomen is soft.   Musculoskeletal:         General: Normal range of motion.      Cervical back: Normal range of motion and neck supple.   Skin:     General: Skin is warm and dry.   Neurological:      Mental Status: She is alert and oriented to person, place, and time.   Psychiatric:         Behavior: Behavior normal.       Urinalysis:  Normal    Assessment/Plan:   Interstitial cystitis:  Patient did not tolerate DMSO treatments.  She still has symptoms with IC diet and IC 2 caps.  Will schedule patient for hydrodistention with Dr. Lloyd.  Patient provided  refill of Uribel.    Female incontinence:  Patient continue oxybutynin as directed.    Follow-up to be arranged.  Problem List Items Addressed This Visit    None     Visit Diagnoses     Interstitial cystitis    -  Primary    Relevant Medications    methen-m.blue-s.phos-phsal-hyo (URIBEL) 118-10-40.8-36 mg Cap    Other Relevant Orders    POCT Urinalysis (w/Micro Option)    Ambulatory Referral to External Surgery    Female incontinence

## 2022-06-17 NOTE — PROGRESS NOTES
Patient educated, consent signed, pre-admission paperwork given. Patient verbalized understanding. DOS 6/27/22 at [facility]. Mk

## 2022-06-22 ENCOUNTER — TELEPHONE (OUTPATIENT)
Dept: UROLOGY | Facility: CLINIC | Age: 42
End: 2022-06-22
Payer: COMMERCIAL

## 2022-06-22 NOTE — TELEPHONE ENCOUNTER
----- Message from Elvi Nieto sent at 6/22/2022  2:20 PM CDT -----  Contact: self  Thinks she has a UTI and would like to know if she can come drop off a urine sample. Please call back at [phone]

## 2022-06-22 NOTE — TELEPHONE ENCOUNTER
Informed pt that she can come drop off a sample, she requested to come tomorrow. appt made for pt. She is nervous about having an infection and having DMSO treatment on Monday with infection. I told her when the nurse calls with UA results to discuss then, she verbalized understanding. Dillan

## 2022-06-23 ENCOUNTER — CLINICAL SUPPORT (OUTPATIENT)
Dept: UROLOGY | Facility: CLINIC | Age: 42
End: 2022-06-23
Payer: COMMERCIAL

## 2022-06-23 DIAGNOSIS — R30.0 DYSURIA: Primary | ICD-10-CM

## 2022-06-23 NOTE — PROGRESS NOTES
Patient came in for a urinalysis due to starts DMSO treatments next week and nervous that she has infection. JING, NP reviewed urine stated no infection noted. Ok to start DMSO treatments. MC LPN left message on machine. MC LPN

## 2022-06-24 LAB
ANION GAP SERPL CALC-SCNC: 7 MMOL/L (ref 3–11)
APPEARANCE, UA: CLEAR
BACTERIA SPEC CULT: ABNORMAL /HPF
BANDS: 2 % (ref 0–5)
BILIRUB UR QL STRIP: NEGATIVE MG/DL
BUN SERPL-MCNC: 10 MG/DL (ref 7–18)
BUN/CREAT SERPL: 18.18 RATIO (ref 7–18)
CALCIUM OXALATE CRYSTALS, UA: ABNORMAL /LPF
CALCIUM SERPL-MCNC: 8.7 MG/DL (ref 8.8–10.5)
CELLS COUNTED: 100
CHLORIDE SERPL-SCNC: 105 MMOL/L (ref 100–108)
CO2 SERPL-SCNC: 27 MMOL/L (ref 21–32)
COLOR UR: ABNORMAL
CREAT SERPL-MCNC: 0.55 MG/DL (ref 0.55–1.02)
ERYTHROCYTE [DISTWIDTH] IN BLOOD BY AUTOMATED COUNT: 13.2 % (ref 12.5–18)
GFR ESTIMATION: > 60
GLUCOSE (UA): NORMAL MG/DL
GLUCOSE SERPL-MCNC: 158 MG/DL (ref 70–110)
HCT VFR BLD AUTO: 37.6 % (ref 37–47)
HGB BLD-MCNC: 12.7 G/DL (ref 12–16)
HGB UR QL STRIP: 10 /UL
KETONES UR QL STRIP: NEGATIVE MG/DL
LEUKOCYTE ESTERASE UR QL STRIP: NEGATIVE /UL
LYMPHOCYTES NFR BLD: 32 % (ref 25–40)
MCH RBC QN AUTO: 30 PG (ref 27–31.2)
MCHC RBC AUTO-ENTMCNC: 33.8 G/DL (ref 31.8–35.4)
MCV RBC AUTO: 88.9 FL (ref 80–97)
MONOCYTES NFR BLD: 6 % (ref 1–15)
NEUTROPHILS # BLD AUTO: 4.8 10*3/UL (ref 1.8–7.7)
NEUTROPHILS NFR BLD: 60 % (ref 37–80)
NITRITE UR QL STRIP: NEGATIVE
PH UR STRIP: 5 PH (ref 5–9)
PLATELETS: 296 10*3/UL (ref 142–424)
POTASSIUM SERPL-SCNC: 3.9 MMOL/L (ref 3.6–5.2)
PROT UR QL STRIP: NEGATIVE MG/DL
RBC # BLD AUTO: 4.23 10*6/UL (ref 4.2–5.4)
RBC #/AREA URNS HPF: ABNORMAL /HPF (ref 0–2)
RBC MORPH BLD: NORMAL
SERVICE COMMENT 03: ABNORMAL
SMALL PLATELETS BLD QL SMEAR: ADEQUATE
SODIUM BLD-SCNC: 139 MMOL/L (ref 135–145)
SP GR UR STRIP: 1.02 (ref 1–1.03)
SPECIMEN COLLECTION METHOD, URINE: ABNORMAL
SQUAMOUS EPITHELIAL, UA: ABNORMAL /LPF
UROBILINOGEN UR STRIP-ACNC: NORMAL MG/DL
WBC # BLD: 7.8 10*3/UL (ref 4.6–10.2)
WBC #/AREA URNS HPF: ABNORMAL /HPF (ref 0–5)

## 2022-06-27 ENCOUNTER — OUTSIDE PLACE OF SERVICE (OUTPATIENT)
Dept: UROLOGY | Facility: CLINIC | Age: 42
End: 2022-06-27

## 2022-06-27 LAB — GLUCOSE SERPL-MCNC: 113 MG/DL (ref 70–105)

## 2022-06-27 PROCEDURE — 52260 CYSTOSCOPY AND TREATMENT: CPT | Mod: ,,, | Performed by: UROLOGY

## 2022-06-27 PROCEDURE — 52260 PR CYSTOSCOPY,DIL BLADDER,GEN ANESTH: ICD-10-PCS | Mod: ,,, | Performed by: UROLOGY

## 2022-06-29 ENCOUNTER — OFFICE VISIT (OUTPATIENT)
Dept: FAMILY MEDICINE | Facility: CLINIC | Age: 42
End: 2022-06-29
Payer: COMMERCIAL

## 2022-06-29 ENCOUNTER — PATIENT MESSAGE (OUTPATIENT)
Dept: ADMINISTRATIVE | Facility: HOSPITAL | Age: 42
End: 2022-06-29
Payer: COMMERCIAL

## 2022-06-29 VITALS
DIASTOLIC BLOOD PRESSURE: 67 MMHG | SYSTOLIC BLOOD PRESSURE: 118 MMHG | BODY MASS INDEX: 34.68 KG/M2 | HEIGHT: 59 IN | OXYGEN SATURATION: 98 % | WEIGHT: 172 LBS

## 2022-06-29 DIAGNOSIS — Z00.00 PREVENTATIVE HEALTH CARE: Primary | ICD-10-CM

## 2022-06-29 DIAGNOSIS — M54.16 LUMBAR RADICULOPATHY, CHRONIC: ICD-10-CM

## 2022-06-29 DIAGNOSIS — K21.9 GASTROESOPHAGEAL REFLUX DISEASE, UNSPECIFIED WHETHER ESOPHAGITIS PRESENT: ICD-10-CM

## 2022-06-29 DIAGNOSIS — H66.90 OTITIS MEDIA, UNSPECIFIED LATERALITY, UNSPECIFIED OTITIS MEDIA TYPE: ICD-10-CM

## 2022-06-29 DIAGNOSIS — M06.9 RHEUMATOID ARTHRITIS INVOLVING MULTIPLE SITES, UNSPECIFIED WHETHER RHEUMATOID FACTOR PRESENT: ICD-10-CM

## 2022-06-29 DIAGNOSIS — Z12.31 ENCOUNTER FOR SCREENING MAMMOGRAM FOR MALIGNANT NEOPLASM OF BREAST: ICD-10-CM

## 2022-06-29 DIAGNOSIS — M54.12 CERVICAL RADICULOPATHY: ICD-10-CM

## 2022-06-29 DIAGNOSIS — M54.6 PAIN IN THORACIC SPINE: ICD-10-CM

## 2022-06-29 PROCEDURE — 1160F PR REVIEW ALL MEDS BY PRESCRIBER/CLIN PHARMACIST DOCUMENTED: ICD-10-PCS | Mod: CPTII,S$GLB,, | Performed by: FAMILY MEDICINE

## 2022-06-29 PROCEDURE — 3074F PR MOST RECENT SYSTOLIC BLOOD PRESSURE < 130 MM HG: ICD-10-PCS | Mod: CPTII,S$GLB,, | Performed by: FAMILY MEDICINE

## 2022-06-29 PROCEDURE — 99215 OFFICE O/P EST HI 40 MIN: CPT | Mod: S$GLB,,, | Performed by: FAMILY MEDICINE

## 2022-06-29 PROCEDURE — 99215 PR OFFICE/OUTPT VISIT, EST, LEVL V, 40-54 MIN: ICD-10-PCS | Mod: S$GLB,,, | Performed by: FAMILY MEDICINE

## 2022-06-29 PROCEDURE — 3074F SYST BP LT 130 MM HG: CPT | Mod: CPTII,S$GLB,, | Performed by: FAMILY MEDICINE

## 2022-06-29 PROCEDURE — 1159F PR MEDICATION LIST DOCUMENTED IN MEDICAL RECORD: ICD-10-PCS | Mod: CPTII,S$GLB,, | Performed by: FAMILY MEDICINE

## 2022-06-29 PROCEDURE — 1159F MED LIST DOCD IN RCRD: CPT | Mod: CPTII,S$GLB,, | Performed by: FAMILY MEDICINE

## 2022-06-29 PROCEDURE — 3008F PR BODY MASS INDEX (BMI) DOCUMENTED: ICD-10-PCS | Mod: CPTII,S$GLB,, | Performed by: FAMILY MEDICINE

## 2022-06-29 PROCEDURE — 1160F RVW MEDS BY RX/DR IN RCRD: CPT | Mod: CPTII,S$GLB,, | Performed by: FAMILY MEDICINE

## 2022-06-29 PROCEDURE — 3078F PR MOST RECENT DIASTOLIC BLOOD PRESSURE < 80 MM HG: ICD-10-PCS | Mod: CPTII,S$GLB,, | Performed by: FAMILY MEDICINE

## 2022-06-29 PROCEDURE — 3078F DIAST BP <80 MM HG: CPT | Mod: CPTII,S$GLB,, | Performed by: FAMILY MEDICINE

## 2022-06-29 PROCEDURE — 3008F BODY MASS INDEX DOCD: CPT | Mod: CPTII,S$GLB,, | Performed by: FAMILY MEDICINE

## 2022-06-29 RX ORDER — FLUCONAZOLE 150 MG/1
150 TABLET ORAL DAILY
Qty: 1 TABLET | Refills: 0 | Status: SHIPPED | OUTPATIENT
Start: 2022-06-29 | End: 2022-08-04 | Stop reason: SDUPTHER

## 2022-06-29 RX ORDER — CEPHALEXIN 500 MG/1
500 CAPSULE ORAL EVERY 6 HOURS
Qty: 28 CAPSULE | Refills: 0 | Status: SHIPPED | OUTPATIENT
Start: 2022-06-29 | End: 2022-07-06

## 2022-06-29 NOTE — PROGRESS NOTES
Subjective:       Patient ID: Rosa Anguiano is a 41 y.o. female.    Chief Complaint: Follow-up (Patient is here for a follow up visit )    HPI     F/u chronic  DM - She went to get labs done but they only song Dr. Mancia's orders. a1c 6.6  She was increased on ozempic to 2 mg   Stomach pain not improved, she is having egd in august with Dr. dereck MONIQUE - would like to est with Dr. Jain, c/o generalized joint pains  C/o spine pain and pain radiating down upper arms, BLE  Also reports ear pain and pressure  Will resend for annual labs  Due for mmg    Review of Systems   Constitutional: Positive for fatigue. Negative for chills, diaphoresis, fever and unexpected weight change.   HENT: Positive for ear pain. Negative for nasal congestion, ear discharge, hearing loss, rhinorrhea, sinus pressure/congestion, sore throat, trouble swallowing and voice change.    Eyes: Negative for pain and visual disturbance.   Respiratory: Negative for cough, chest tightness, shortness of breath and wheezing.    Cardiovascular: Negative for chest pain, palpitations and leg swelling.   Gastrointestinal: Negative for abdominal pain, constipation, diarrhea, nausea and vomiting.   Endocrine: Negative for polydipsia and polyuria.   Genitourinary: Negative for decreased urine volume, dysuria, flank pain, frequency, hematuria, pelvic pain, vaginal bleeding and vaginal discharge.   Musculoskeletal: Positive for arthralgias, back pain and neck pain. Negative for myalgias.   Integumentary:  Negative for color change, pallor and rash.   Neurological: Positive for numbness. Negative for dizziness, syncope, weakness, light-headedness and headaches.   Hematological: Negative for adenopathy.   Psychiatric/Behavioral: Negative for decreased concentration, dysphoric mood and sleep disturbance. The patient is not nervous/anxious.          Objective:      Physical Exam  Vitals reviewed.   Constitutional:       General: She is not in acute distress.      Appearance: She is well-developed. She is not diaphoretic.   HENT:      Head: Normocephalic and atraumatic.      Right Ear: External ear normal.      Left Ear: External ear normal.      Nose: Nose normal. No congestion or rhinorrhea.      Mouth/Throat:      Mouth: Mucous membranes are moist.      Pharynx: Oropharynx is clear.   Eyes:      General: No scleral icterus.     Conjunctiva/sclera: Conjunctivae normal.      Pupils: Pupils are equal, round, and reactive to light.   Neck:      Thyroid: No thyromegaly.      Vascular: No JVD.   Cardiovascular:      Rate and Rhythm: Normal rate and regular rhythm.      Pulses: Normal pulses.      Heart sounds: Normal heart sounds. No murmur heard.    No friction rub. No gallop.   Pulmonary:      Effort: Pulmonary effort is normal. No respiratory distress.      Breath sounds: Normal breath sounds. No stridor. No wheezing, rhonchi or rales.   Abdominal:      General: Bowel sounds are normal. There is no distension.      Palpations: Abdomen is soft.      Tenderness: There is no abdominal tenderness.   Musculoskeletal:         General: No swelling or tenderness.      Cervical back: Normal range of motion and neck supple.      Right lower leg: No edema.      Left lower leg: No edema.   Lymphadenopathy:      Cervical: No cervical adenopathy.   Skin:     General: Skin is warm and dry.      Coloration: Skin is not pale.      Findings: No erythema, lesion or rash.   Neurological:      General: No focal deficit present.      Mental Status: She is alert and oriented to person, place, and time.      Cranial Nerves: No cranial nerve deficit.   Psychiatric:         Mood and Affect: Mood normal.         Behavior: Behavior normal.         Thought Content: Thought content normal.         Judgment: Judgment normal.         Assessment:       Problem List Items Addressed This Visit        GI    GERD (gastroesophageal reflux disease)      Other Visit Diagnoses     Preventative health care    -   Primary    Relevant Orders    CBC Auto Differential    TSH    Lipid Panel    T4, Free    Rheumatoid arthritis involving multiple sites, unspecified whether rheumatoid factor present        Relevant Orders    Ambulatory referral/consult to Rheumatology    Sedimentation rate    C-reactive protein    Lumbar radiculopathy, chronic        Relevant Orders    MRI Lumbar Spine Without Contrast    Pain in thoracic spine        Relevant Orders    MRI Thoracic Spine Without Contrast    Cervical radiculopathy        Relevant Orders    MRI Cervical Spine Without Contrast    Otitis media, unspecified laterality, unspecified otitis media type        Relevant Medications    cephALEXin (KEFLEX) 500 MG capsule    fluconazole (DIFLUCAN) 150 MG Tab    Encounter for screening mammogram for malignant neoplasm of breast        Relevant Orders    Mammo Digital Screening Bilat          Plan:       Rosa was seen today for follow-up.    Diagnoses and all orders for this visit:    Preventative health care  -     CBC Auto Differential; Future  -     TSH; Future  -     Lipid Panel; Future  -     T4, Free; Future  -     CBC Auto Differential  -     TSH  -     Lipid Panel  -     T4, Free    Rheumatoid arthritis involving multiple sites, unspecified whether rheumatoid factor present  -     Ambulatory referral/consult to Rheumatology; Future  -     Sedimentation rate; Future  -     C-reactive protein; Future  -     Sedimentation rate  -     C-reactive protein    Lumbar radiculopathy, chronic  -     MRI Lumbar Spine Without Contrast; Future  -     MRI Lumbar Spine Without Contrast    Gastroesophageal reflux disease, unspecified whether esophagitis present  Comments:  has upcoming scope    Pain in thoracic spine  -     MRI Thoracic Spine Without Contrast; Future  -     MRI Thoracic Spine Without Contrast    Cervical radiculopathy  -     MRI Cervical Spine Without Contrast; Future  -     MRI Cervical Spine Without Contrast    Otitis media, unspecified  laterality, unspecified otitis media type  -     cephALEXin (KEFLEX) 500 MG capsule; Take 1 capsule (500 mg total) by mouth every 6 (six) hours. for 7 days  -     fluconazole (DIFLUCAN) 150 MG Tab; Take 1 tablet (150 mg total) by mouth once daily. for 1 day    Encounter for screening mammogram for malignant neoplasm of breast  -     Mammo Digital Screening Bilat; Future  -     Mammo Digital Screening Bilat

## 2022-07-14 LAB
CHOLEST SERPL-MSCNC: 216 MG/DL (ref 100–200)
HDLC SERPL-MCNC: 50 >60
LDL/HDL RATIO: 2.8 (ref 1–3)
LDLC SERPL CALC-MCNC: 138 MG/DL (ref 0–100)
TRIGL SERPL-MCNC: 141 MG/DL (ref 0–150)

## 2022-07-18 ENCOUNTER — OFFICE VISIT (OUTPATIENT)
Dept: FAMILY MEDICINE | Facility: CLINIC | Age: 42
End: 2022-07-18
Payer: COMMERCIAL

## 2022-07-18 ENCOUNTER — PATIENT MESSAGE (OUTPATIENT)
Dept: FAMILY MEDICINE | Facility: CLINIC | Age: 42
End: 2022-07-18

## 2022-07-18 ENCOUNTER — PATIENT OUTREACH (OUTPATIENT)
Dept: ADMINISTRATIVE | Facility: HOSPITAL | Age: 42
End: 2022-07-18
Payer: COMMERCIAL

## 2022-07-18 VITALS
OXYGEN SATURATION: 100 % | BODY MASS INDEX: 34.68 KG/M2 | HEART RATE: 95 BPM | DIASTOLIC BLOOD PRESSURE: 64 MMHG | WEIGHT: 172 LBS | RESPIRATION RATE: 14 BRPM | SYSTOLIC BLOOD PRESSURE: 108 MMHG | HEIGHT: 59 IN

## 2022-07-18 DIAGNOSIS — E78.5 HYPERLIPIDEMIA, UNSPECIFIED HYPERLIPIDEMIA TYPE: ICD-10-CM

## 2022-07-18 DIAGNOSIS — N76.0 ACUTE VAGINITIS: ICD-10-CM

## 2022-07-18 DIAGNOSIS — R30.0 DYSURIA: Primary | ICD-10-CM

## 2022-07-18 DIAGNOSIS — R31.9 HEMATURIA, UNSPECIFIED TYPE: ICD-10-CM

## 2022-07-18 DIAGNOSIS — J06.9 UPPER RESPIRATORY TRACT INFECTION, UNSPECIFIED TYPE: ICD-10-CM

## 2022-07-18 LAB
BILIRUB SERPL-MCNC: NEGATIVE MG/DL
BLOOD URINE, POC: ABNORMAL
CLARITY, POC UA: CLEAR
COLOR, POC UA: ABNORMAL
GLUCOSE UR QL STRIP: NEGATIVE
KETONES UR QL STRIP: NEGATIVE
LEUKOCYTE ESTERASE URINE, POC: NEGATIVE
NITRITE, POC UA: NEGATIVE
PH, POC UA: 5.5
PROTEIN, POC: NEGATIVE
SPECIFIC GRAVITY, POC UA: 1.03
UROBILINOGEN, POC UA: 0.2

## 2022-07-18 PROCEDURE — 3044F HG A1C LEVEL LT 7.0%: CPT | Mod: CPTII,S$GLB,, | Performed by: PHYSICIAN ASSISTANT

## 2022-07-18 PROCEDURE — 99214 OFFICE O/P EST MOD 30 MIN: CPT | Mod: S$GLB,,, | Performed by: PHYSICIAN ASSISTANT

## 2022-07-18 PROCEDURE — 3074F SYST BP LT 130 MM HG: CPT | Mod: CPTII,S$GLB,, | Performed by: PHYSICIAN ASSISTANT

## 2022-07-18 PROCEDURE — 3008F PR BODY MASS INDEX (BMI) DOCUMENTED: ICD-10-PCS | Mod: CPTII,S$GLB,, | Performed by: PHYSICIAN ASSISTANT

## 2022-07-18 PROCEDURE — 3044F PR MOST RECENT HEMOGLOBIN A1C LEVEL <7.0%: ICD-10-PCS | Mod: CPTII,S$GLB,, | Performed by: PHYSICIAN ASSISTANT

## 2022-07-18 PROCEDURE — 3008F BODY MASS INDEX DOCD: CPT | Mod: CPTII,S$GLB,, | Performed by: PHYSICIAN ASSISTANT

## 2022-07-18 PROCEDURE — 99214 PR OFFICE/OUTPT VISIT, EST, LEVL IV, 30-39 MIN: ICD-10-PCS | Mod: S$GLB,,, | Performed by: PHYSICIAN ASSISTANT

## 2022-07-18 PROCEDURE — 3078F DIAST BP <80 MM HG: CPT | Mod: CPTII,S$GLB,, | Performed by: PHYSICIAN ASSISTANT

## 2022-07-18 PROCEDURE — 3078F PR MOST RECENT DIASTOLIC BLOOD PRESSURE < 80 MM HG: ICD-10-PCS | Mod: CPTII,S$GLB,, | Performed by: PHYSICIAN ASSISTANT

## 2022-07-18 PROCEDURE — 3074F PR MOST RECENT SYSTOLIC BLOOD PRESSURE < 130 MM HG: ICD-10-PCS | Mod: CPTII,S$GLB,, | Performed by: PHYSICIAN ASSISTANT

## 2022-07-18 PROCEDURE — 81002 URINALYSIS NONAUTO W/O SCOPE: CPT | Mod: S$GLB,,, | Performed by: PHYSICIAN ASSISTANT

## 2022-07-18 PROCEDURE — 81002 POCT URINE DIPSTICK WITHOUT MICROSCOPE: ICD-10-PCS | Mod: S$GLB,,, | Performed by: PHYSICIAN ASSISTANT

## 2022-07-18 RX ORDER — FLUCONAZOLE 150 MG/1
150 TABLET ORAL DAILY
Qty: 2 TABLET | Refills: 0 | Status: SHIPPED | OUTPATIENT
Start: 2022-07-18 | End: 2022-07-19

## 2022-07-18 NOTE — PROGRESS NOTES
Working A1C gap report  Uploading and hyper-linking Lipid panel done 7.14.2022 and A1C with CMP done 6.1.2022.

## 2022-07-18 NOTE — PROGRESS NOTES
Subjective:      Patient ID: Rosa Anguiano is a 41 y.o. female.    Chief Complaint: Follow-up (Lab results), Dysuria (X 3 days ), and Flank Pain (Right Flank pain x 3 days. Hx of Kidney stones)      HPI  Patient is here today for lab follow-up, dysuria, flank pain, and imaging follow-up.    Labs:  Most recent labs reviewed with patient.  She does have elevated lipids.  We discussed lifestyle modifications to help with this.  Will follow up in 6 months.  Positive CRP pain.  Patient does have history of R A. Will follow up with room.    Dysuria/vaginitis:  Patient reports vaginal itching and burning since completing her antibiotics.  Reports when she urinates it does burn her vaginal area.      Right flank pain:  Patient reports intermittent flank pain for the past several weeks.  She has a history of stones.    Right ear fullness:  Patient reports persisting right ear fullness, postnasal drip at night, mild cough.    Imaging follow-up:  Patient had MRI of the C-spine, L-spine, and T-spine ordered several weeks ago.  Reports she has not heard from the imaging department yet on these orders.  Will reach out today.      Review of Systems   Constitutional: Negative for activity change, appetite change, chills, diaphoresis, fatigue and fever.   Respiratory: Negative for cough, choking, chest tightness and shortness of breath.    Cardiovascular: Negative for chest pain and leg swelling.   Gastrointestinal: Negative for abdominal distention, abdominal pain, blood in stool, constipation, diarrhea, nausea, vomiting and reflux.   Genitourinary: Positive for dysuria, flank pain and vaginal discharge (white to yellow - reports recent abx). Negative for bladder incontinence, decreased urine volume, difficulty urinating, enuresis, frequency, hematuria, nocturia, pelvic pain, urgency, vaginal bleeding, vaginal pain and vaginal dryness.   Musculoskeletal: Positive for arthralgias and back pain. Negative for leg pain and myalgias.  "      Medication List with Changes/Refills   New Medications    FLUCONAZOLE (DIFLUCAN) 150 MG TAB    Take 1 tablet (150 mg total) by mouth once daily. Take one day, repeat in 3 days if not resolved for 1 day   Current Medications    BORIC ACID (PH-D) 600 MG VAGINAL SUPPOSITORY    Place 1 suppository (600 mg total) vaginally every evening. As directed    CLINDESSE VAGINAL CREAM        FAMOTIDINE (PEPCID) 20 MG TABLET    TAKE 1 TABLET BY MOUTH TWICE DAILY AS NEEDED FOR STOMACH ACID OR REFLUX OR HEARTBURN    HYOSCYAMINE (ANASPAZ,LEVSIN) 0.125 MG TAB    Take 1 tablet (125 mcg total) by mouth every 4 (four) hours as needed (stomach pain).    MELOXICAM (MOBIC) 15 MG TABLET    Take 1 tablet (15 mg total) by mouth once daily.    METHEN-M.BLUE-S.PHOS-PHSAL-HYO (URIBEL) 118-10-40.8-36 MG CAP    TAKE 1 CAPSULE BY MOUTH THREE TIMES DAILY AS NEEDED FOR DYSURIA/BLADDER SPASMS    METHOTREXATE 2.5 MG TAB        OXYBUTYNIN (DITROPAN XL) 15 MG TR24    Take 1 tablet (15 mg total) by mouth once daily.    OZEMPIC 2 MG/DOSE (8 MG/3 ML) PNIJ        PANTOPRAZOLE (PROTONIX) 40 MG TABLET        PREGABALIN (LYRICA) 150 MG CAPSULE    Take 150 mg by mouth 2 (two) times daily.    PROAIR HFA 90 MCG/ACTUATION INHALER    INL 2 PFS PO Q 4 H    SUCRALFATE (CARAFATE) 1 GRAM TABLET    TAKE 1 TABLET BY MOUTH THREE TIMES DAILY BEFORE MEALS AS DIRECTED    XIFAXAN 550 MG TAB            Objective:     Vitals:    07/18/22 1501   BP: 108/64   Pulse: 95   Resp: 14   SpO2: 100%   Weight: 78 kg (172 lb)   Height: 4' 11" (1.499 m)        Physical Exam  Constitutional:       General: She is not in acute distress.     Appearance: She is not ill-appearing, toxic-appearing or diaphoretic.   HENT:      Head: Normocephalic.      Right Ear: Tympanic membrane, ear canal and external ear normal. No mastoid tenderness.      Left Ear: Tympanic membrane, ear canal and external ear normal. No mastoid tenderness.      Nose: Congestion present.      Mouth/Throat:      Mouth: " Mucous membranes are moist.      Pharynx: Oropharynx is clear. No oropharyngeal exudate.   Eyes:      General: No scleral icterus.        Right eye: No discharge.         Left eye: No discharge.      Conjunctiva/sclera: Conjunctivae normal.   Cardiovascular:      Rate and Rhythm: Normal rate and regular rhythm.      Pulses: Normal pulses.      Heart sounds: Normal heart sounds. No murmur heard.    No friction rub. No gallop.   Pulmonary:      Effort: Pulmonary effort is normal. No respiratory distress.      Breath sounds: No wheezing, rhonchi or rales.   Abdominal:      Tenderness: There is no right CVA tenderness or left CVA tenderness.   Musculoskeletal:         General: No swelling.      Cervical back: Normal range of motion. No rigidity or tenderness.      Right lower leg: No edema.      Left lower leg: No edema.      Comments: Moves all extremities well and with good control    Lymphadenopathy:      Cervical: No cervical adenopathy.   Skin:     General: Skin is warm and dry.   Neurological:      Mental Status: She is alert and oriented to person, place, and time.   Psychiatric:         Mood and Affect: Mood normal.         Behavior: Behavior normal.            Assessment & Plan:     Dysuria  -     POCT URINE DIPSTICK WITHOUT MICROSCOPE  -     Urine culture  -     fluconazole (DIFLUCAN) 150 MG Tab; Take 1 tablet (150 mg total) by mouth once daily. Take one day, repeat in 3 days if not resolved for 1 day  Dispense: 2 tablet; Refill: 0    Hematuria, unspecified type  Comments:  Likely from stone.  Follow up with Urology    Acute vaginitis  -     fluconazole (DIFLUCAN) 150 MG Tab; Take 1 tablet (150 mg total) by mouth once daily. Take one day, repeat in 3 days if not resolved for 1 day  Dispense: 2 tablet; Refill: 0    Upper respiratory tract infection, unspecified type  Comments:  Flonase, antihistamine, Sudafed as directed.    Hyperlipidemia, unspecified hyperlipidemia type  Comments:  Counseled on diet,  exercise, weight loss.  Six-month follow-up         No follow-ups on file.              Tameka Dyson PA-C

## 2022-07-19 ENCOUNTER — OFFICE VISIT (OUTPATIENT)
Dept: UROLOGY | Facility: CLINIC | Age: 42
End: 2022-07-19
Payer: COMMERCIAL

## 2022-07-19 ENCOUNTER — HOSPITAL ENCOUNTER (OUTPATIENT)
Dept: RADIOLOGY | Facility: CLINIC | Age: 42
Discharge: HOME | End: 2022-07-19
Attending: NURSE PRACTITIONER
Payer: COMMERCIAL

## 2022-07-19 VITALS
DIASTOLIC BLOOD PRESSURE: 85 MMHG | HEART RATE: 80 BPM | RESPIRATION RATE: 16 BRPM | HEIGHT: 59 IN | BODY MASS INDEX: 33.26 KG/M2 | WEIGHT: 165 LBS | SYSTOLIC BLOOD PRESSURE: 120 MMHG | TEMPERATURE: 97 F

## 2022-07-19 DIAGNOSIS — R10.9 RIGHT FLANK PAIN: ICD-10-CM

## 2022-07-19 DIAGNOSIS — R31.0 GROSS HEMATURIA: ICD-10-CM

## 2022-07-19 DIAGNOSIS — R10.9 ABDOMINAL PAIN, UNSPECIFIED ABDOMINAL LOCATION: ICD-10-CM

## 2022-07-19 DIAGNOSIS — N20.0 KIDNEY STONES: ICD-10-CM

## 2022-07-19 DIAGNOSIS — N32.89 BLADDER SPASMS: Primary | ICD-10-CM

## 2022-07-19 PROCEDURE — 74018 XR ABDOMEN AP 1 VIEW: ICD-10-PCS | Mod: TC,,, | Performed by: UROLOGY

## 2022-07-19 PROCEDURE — 74018 RADEX ABDOMEN 1 VIEW: CPT | Mod: TC,,, | Performed by: UROLOGY

## 2022-07-19 PROCEDURE — 1159F PR MEDICATION LIST DOCUMENTED IN MEDICAL RECORD: ICD-10-PCS | Mod: CPTII,S$GLB,, | Performed by: NURSE PRACTITIONER

## 2022-07-19 PROCEDURE — 74018 XR ABDOMEN AP 1 VIEW: ICD-10-PCS | Mod: 26,,, | Performed by: RADIOLOGY

## 2022-07-19 PROCEDURE — 3008F PR BODY MASS INDEX (BMI) DOCUMENTED: ICD-10-PCS | Mod: CPTII,S$GLB,, | Performed by: NURSE PRACTITIONER

## 2022-07-19 PROCEDURE — 1159F MED LIST DOCD IN RCRD: CPT | Mod: CPTII,S$GLB,, | Performed by: NURSE PRACTITIONER

## 2022-07-19 PROCEDURE — 1160F RVW MEDS BY RX/DR IN RCRD: CPT | Mod: CPTII,S$GLB,, | Performed by: NURSE PRACTITIONER

## 2022-07-19 PROCEDURE — 3074F SYST BP LT 130 MM HG: CPT | Mod: CPTII,S$GLB,, | Performed by: NURSE PRACTITIONER

## 2022-07-19 PROCEDURE — 3074F PR MOST RECENT SYSTOLIC BLOOD PRESSURE < 130 MM HG: ICD-10-PCS | Mod: CPTII,S$GLB,, | Performed by: NURSE PRACTITIONER

## 2022-07-19 PROCEDURE — 99214 OFFICE O/P EST MOD 30 MIN: CPT | Mod: S$GLB,,, | Performed by: NURSE PRACTITIONER

## 2022-07-19 PROCEDURE — 1160F PR REVIEW ALL MEDS BY PRESCRIBER/CLIN PHARMACIST DOCUMENTED: ICD-10-PCS | Mod: CPTII,S$GLB,, | Performed by: NURSE PRACTITIONER

## 2022-07-19 PROCEDURE — 3008F BODY MASS INDEX DOCD: CPT | Mod: CPTII,S$GLB,, | Performed by: NURSE PRACTITIONER

## 2022-07-19 PROCEDURE — 3079F DIAST BP 80-89 MM HG: CPT | Mod: CPTII,S$GLB,, | Performed by: NURSE PRACTITIONER

## 2022-07-19 PROCEDURE — 3044F HG A1C LEVEL LT 7.0%: CPT | Mod: CPTII,S$GLB,, | Performed by: NURSE PRACTITIONER

## 2022-07-19 PROCEDURE — 74018 RADEX ABDOMEN 1 VIEW: CPT | Mod: 26,,, | Performed by: RADIOLOGY

## 2022-07-19 PROCEDURE — 3044F PR MOST RECENT HEMOGLOBIN A1C LEVEL <7.0%: ICD-10-PCS | Mod: CPTII,S$GLB,, | Performed by: NURSE PRACTITIONER

## 2022-07-19 PROCEDURE — 3079F PR MOST RECENT DIASTOLIC BLOOD PRESSURE 80-89 MM HG: ICD-10-PCS | Mod: CPTII,S$GLB,, | Performed by: NURSE PRACTITIONER

## 2022-07-19 PROCEDURE — 99214 PR OFFICE/OUTPT VISIT, EST, LEVL IV, 30-39 MIN: ICD-10-PCS | Mod: S$GLB,,, | Performed by: NURSE PRACTITIONER

## 2022-07-19 RX ORDER — FLUTICASONE PROPIONATE 50 MCG
2 SPRAY, SUSPENSION (ML) NASAL 2 TIMES DAILY
COMMUNITY
Start: 2022-03-15 | End: 2023-08-29

## 2022-07-19 RX ORDER — HYDROXYZINE PAMOATE 25 MG/1
CAPSULE ORAL
COMMUNITY
Start: 2022-06-23 | End: 2023-08-29

## 2022-07-19 NOTE — PROGRESS NOTES
Subjective:       Patient ID: Rosa Anguiano is a 41 y.o. female.    Chief Complaint: Abdominal Pain, Urinary Tract Infection, Dysuria (Bladder spasms), and Nephrolithiasis      HPI: 41-year-old female, established patient, presents complaint of kidney stone.  Patient states he has been having pain to her right flank for approximately 5 days.  She rates the pain a 9/10 and describes the pain as stabbing.  She states he is having bladder spasms, urinary frequency, decreased output, and visible blood in her urine.  Patient denies history of kidney stones.  She has a history of interstitial cystitis and incontinence.    She denies any fever or body aches.       Past Medical History:   Past Medical History:   Diagnosis Date    Acute pelvic pain, female     Breast pain     Cystitis     Diabetes mellitus     Dyspareunia, female     Endometriosis     Fibromyalgia     GERD (gastroesophageal reflux disease)     IBS (irritable bowel syndrome)     Interstitial cystitis     Irregular menstrual cycle     Leukorrhea     Osteoarthritis     Ovarian cyst     Pneumonia     YAMILET (stress urinary incontinence, female)     Vaginal yeast infection     Vulvitis        Past Surgical Historical:   Past Surgical History:   Procedure Laterality Date     SECTION      CHOLECYSTECTOMY      LAPAROSCOPY-ASSISTED VAGINAL HYSTERECTOMY      TUBAL LIGATION          Medications:   Medication List with Changes/Refills   Current Medications    BORIC ACID (PH-D) 600 MG VAGINAL SUPPOSITORY    Place 1 suppository (600 mg total) vaginally every evening. As directed    CLINDESSE VAGINAL CREAM        FAMOTIDINE (PEPCID) 20 MG TABLET    TAKE 1 TABLET BY MOUTH TWICE DAILY AS NEEDED FOR STOMACH ACID OR REFLUX OR HEARTBURN    FLUCONAZOLE (DIFLUCAN) 150 MG TAB    Take 1 tablet (150 mg total) by mouth once daily. Take one day, repeat in 3 days if not resolved for 1 day    FLUTICASONE PROPIONATE (FLONASE) 50 MCG/ACTUATION NASAL SPRAY    2  sprays by Each Nostril route 2 (two) times daily.    HYDROXYZINE PAMOATE (VISTARIL) 25 MG CAP        HYOSCYAMINE (ANASPAZ,LEVSIN) 0.125 MG TAB    Take 1 tablet (125 mcg total) by mouth every 4 (four) hours as needed (stomach pain).    MELOXICAM (MOBIC) 15 MG TABLET    Take 1 tablet (15 mg total) by mouth once daily.    SELAM-M.BLUE-S.PHOS-PHSAL-HYO (URIBEL) 118-10-40.8-36 MG CAP    TAKE 1 CAPSULE BY MOUTH THREE TIMES DAILY AS NEEDED FOR DYSURIA/BLADDER SPASMS    METHOTREXATE 2.5 MG TAB        OXYBUTYNIN (DITROPAN XL) 15 MG TR24    Take 1 tablet (15 mg total) by mouth once daily.    OZEMPIC 2 MG/DOSE (8 MG/3 ML) PNIJ        PANTOPRAZOLE (PROTONIX) 40 MG TABLET        PREGABALIN (LYRICA) 150 MG CAPSULE    Take 150 mg by mouth 2 (two) times daily.    PROAIR HFA 90 MCG/ACTUATION INHALER    INL 2 PFS PO Q 4 H    SUCRALFATE (CARAFATE) 1 GRAM TABLET    TAKE 1 TABLET BY MOUTH THREE TIMES DAILY BEFORE MEALS AS DIRECTED    XIFAXAN 550 MG TAB            Past Social History:   Social History     Socioeconomic History    Marital status:    Tobacco Use    Smoking status: Never Smoker    Smokeless tobacco: Never Used   Substance and Sexual Activity    Alcohol use: Not Currently    Drug use: Never    Sexual activity: Yes     Partners: Male     Birth control/protection: See Surgical Hx     Comment: Hysterectomy       Allergies:   Review of patient's allergies indicates:   Allergen Reactions    Trulicity [dulaglutide]      Vomiting        Family History:   Family History   Problem Relation Age of Onset    Colon cancer Father 60    Prostate cancer Father 60    Breast cancer Sister 66    Ovarian cancer Neg Hx     Uterine cancer Neg Hx     Melanoma Neg Hx     Pancreatic cancer Neg Hx         Review of Systems:  Review of Systems   Constitutional: Negative for activity change and appetite change.   HENT: Negative for congestion and dental problem.    Respiratory: Negative for chest tightness and shortness of breath.     Cardiovascular: Negative for chest pain.   Gastrointestinal: Negative for abdominal distention and abdominal pain.   Genitourinary: Positive for difficulty urinating, flank pain, frequency and hematuria. Negative for decreased urine volume, dyspareunia, dysuria, enuresis, genital sores, pelvic pain and urgency.   Musculoskeletal: Negative for back pain and neck pain.   Allergic/Immunologic: Negative for immunocompromised state.   Neurological: Negative for dizziness.   Hematological: Negative for adenopathy.   Psychiatric/Behavioral: Negative for agitation, behavioral problems and confusion.       Physical Exam:  Physical Exam  Vitals and nursing note reviewed.   Constitutional:       Appearance: She is well-developed.   HENT:      Head: Normocephalic.   Eyes:      Pupils: Pupils are equal, round, and reactive to light.   Cardiovascular:      Rate and Rhythm: Normal rate and regular rhythm.      Heart sounds: Normal heart sounds.   Pulmonary:      Effort: Pulmonary effort is normal.      Breath sounds: Normal breath sounds.   Abdominal:      General: Bowel sounds are normal.      Palpations: Abdomen is soft.   Musculoskeletal:         General: Normal range of motion.      Cervical back: Normal range of motion and neck supple.   Skin:     General: Skin is warm and dry.   Neurological:      Mental Status: She is alert and oriented to person, place, and time.   Psychiatric:         Behavior: Behavior normal.       Urinalysis:  Trace intact blood, blood cells 0-2.  KUB:  See radiology report.    Assessment/Plan:   Flank pain/hematuria/bladder spasms:  Will send patient for stat CT abdomen pelvis without contrast.  Will notify patient of results and plan of care.    Follow-up to arrange pending CT.  Problem List Items Addressed This Visit    None     Visit Diagnoses     Bladder spasms    -  Primary    Kidney stones        Relevant Orders    POCT Urinalysis (w/Micro Option)    X-Ray Abdomen AP 1 View (Completed)    CT  Abdomen Pelvis  Without Contrast    Right flank pain        Relevant Orders    CT Abdomen Pelvis  Without Contrast    Gross hematuria        Relevant Orders    CT Abdomen Pelvis  Without Contrast    Abdominal pain, unspecified abdominal location        Relevant Orders    CT Abdomen Pelvis  Without Contrast

## 2022-07-20 LAB — URINE CULTURE, ROUTINE: NORMAL

## 2022-07-25 ENCOUNTER — TELEPHONE (OUTPATIENT)
Dept: FAMILY MEDICINE | Facility: CLINIC | Age: 42
End: 2022-07-25
Payer: COMMERCIAL

## 2022-07-25 NOTE — TELEPHONE ENCOUNTER
----- Message from Tameka Dyson PA-C sent at 7/25/2022  9:08 AM CDT -----  K - please call patient, urine is not infected,  please continue with prior treatment plan

## 2022-07-26 ENCOUNTER — OFFICE VISIT (OUTPATIENT)
Dept: FAMILY MEDICINE | Facility: CLINIC | Age: 42
End: 2022-07-26
Payer: COMMERCIAL

## 2022-07-26 VITALS
HEIGHT: 59 IN | OXYGEN SATURATION: 100 % | HEART RATE: 85 BPM | SYSTOLIC BLOOD PRESSURE: 124 MMHG | WEIGHT: 165 LBS | RESPIRATION RATE: 15 BRPM | DIASTOLIC BLOOD PRESSURE: 78 MMHG | BODY MASS INDEX: 33.26 KG/M2

## 2022-07-26 DIAGNOSIS — M05.79 RHEUMATOID ARTHRITIS INVOLVING MULTIPLE SITES WITH POSITIVE RHEUMATOID FACTOR: ICD-10-CM

## 2022-07-26 DIAGNOSIS — M79.2 NEUROPATHIC PAIN: Primary | ICD-10-CM

## 2022-07-26 DIAGNOSIS — M54.2 NECK PAIN: ICD-10-CM

## 2022-07-26 DIAGNOSIS — M54.9 BACK PAIN, UNSPECIFIED BACK LOCATION, UNSPECIFIED BACK PAIN LATERALITY, UNSPECIFIED CHRONICITY: ICD-10-CM

## 2022-07-26 PROCEDURE — 3074F SYST BP LT 130 MM HG: CPT | Mod: CPTII,S$GLB,, | Performed by: PHYSICIAN ASSISTANT

## 2022-07-26 PROCEDURE — 99213 OFFICE O/P EST LOW 20 MIN: CPT | Mod: S$GLB,,, | Performed by: PHYSICIAN ASSISTANT

## 2022-07-26 PROCEDURE — 1159F MED LIST DOCD IN RCRD: CPT | Mod: CPTII,S$GLB,, | Performed by: PHYSICIAN ASSISTANT

## 2022-07-26 PROCEDURE — 3074F PR MOST RECENT SYSTOLIC BLOOD PRESSURE < 130 MM HG: ICD-10-PCS | Mod: CPTII,S$GLB,, | Performed by: PHYSICIAN ASSISTANT

## 2022-07-26 PROCEDURE — 99213 PR OFFICE/OUTPT VISIT, EST, LEVL III, 20-29 MIN: ICD-10-PCS | Mod: S$GLB,,, | Performed by: PHYSICIAN ASSISTANT

## 2022-07-26 PROCEDURE — 3044F PR MOST RECENT HEMOGLOBIN A1C LEVEL <7.0%: ICD-10-PCS | Mod: CPTII,S$GLB,, | Performed by: PHYSICIAN ASSISTANT

## 2022-07-26 PROCEDURE — 3044F HG A1C LEVEL LT 7.0%: CPT | Mod: CPTII,S$GLB,, | Performed by: PHYSICIAN ASSISTANT

## 2022-07-26 PROCEDURE — 3008F BODY MASS INDEX DOCD: CPT | Mod: CPTII,S$GLB,, | Performed by: PHYSICIAN ASSISTANT

## 2022-07-26 PROCEDURE — 1159F PR MEDICATION LIST DOCUMENTED IN MEDICAL RECORD: ICD-10-PCS | Mod: CPTII,S$GLB,, | Performed by: PHYSICIAN ASSISTANT

## 2022-07-26 PROCEDURE — 3008F PR BODY MASS INDEX (BMI) DOCUMENTED: ICD-10-PCS | Mod: CPTII,S$GLB,, | Performed by: PHYSICIAN ASSISTANT

## 2022-07-26 PROCEDURE — 3078F DIAST BP <80 MM HG: CPT | Mod: CPTII,S$GLB,, | Performed by: PHYSICIAN ASSISTANT

## 2022-07-26 PROCEDURE — 3078F PR MOST RECENT DIASTOLIC BLOOD PRESSURE < 80 MM HG: ICD-10-PCS | Mod: CPTII,S$GLB,, | Performed by: PHYSICIAN ASSISTANT

## 2022-07-26 RX ORDER — METAXALONE 800 MG/1
800 TABLET ORAL 3 TIMES DAILY
Qty: 30 TABLET | Refills: 0 | Status: SHIPPED | OUTPATIENT
Start: 2022-07-26 | End: 2022-08-05

## 2022-07-26 RX ORDER — DICLOFENAC SODIUM 50 MG/1
TABLET, DELAYED RELEASE ORAL
Qty: 90 TABLET | Refills: 1 | Status: SHIPPED | OUTPATIENT
Start: 2022-07-26 | End: 2022-08-10 | Stop reason: SDUPTHER

## 2022-07-26 NOTE — PROGRESS NOTES
Subjective:      Patient ID: Rosa Anguiano is a 41 y.o. female.    Chief Complaint: Follow-up (Patient here with c/o back pain, generalized body aches, and skin pain (burning sensation.//1. Back pain-awaiting authorization for MRI's/2. Generalized body aches-patient currently seeing Dr. Wright, but is just being given pain medications instead of a treatment plan per patient. She is seeing Cristobal in August to establish care./3. Skin pain-has been dx with shingles in the past with no lesions/rash. Verbalizes the pain is the same.), Back Pain, Generalized Body Aches, and burning sensation to skin      HPI   Patient is here today for follow-up and new complaints.    Patient reports burning sensation and stinging sensation over her skin.  Reports she has a history of fibromyalgia and RA.  She was seen by Dr. Wrihgt in the past but has an upcoming appointment with Dr. Jain in August.  Patient currently takes Lyrica.    Neck and back pain-patient reports chronic pain and muscle tension in her neck and back.  She is awaiting approval for 2 are eyes.  She currently takes Mobic daily without relief.    Pt has completed 8 weeks of physical therapy for her neck and back without relief.      Review of Systems   Constitutional: Negative for activity change, appetite change, chills, fatigue and fever.   Eyes: Negative for pain and visual disturbance.   Respiratory: Negative for cough, chest tightness and shortness of breath.    Cardiovascular: Negative for chest pain, palpitations, leg swelling and claudication.   Gastrointestinal: Negative for abdominal distention, abdominal pain, anal bleeding, blood in stool, constipation, diarrhea, nausea, vomiting and reflux.   Endocrine: Negative for polydipsia and polyuria.   Genitourinary: Negative for difficulty urinating, flank pain and frequency.   Musculoskeletal: Positive for arthralgias, back pain, leg pain, myalgias and neck stiffness.   Neurological: Negative for dizziness, weakness,  "light-headedness, numbness and headaches.   Psychiatric/Behavioral: Negative for self-injury, sleep disturbance and suicidal ideas. The patient is not nervous/anxious.        Medication List with Changes/Refills   New Medications    DICLOFENAC (VOLTAREN) 50 MG EC TABLET    1 tab 2-3 times a day with a meal    METAXALONE (SKELAXIN) 800 MG TABLET    Take 1 tablet (800 mg total) by mouth 3 (three) times daily. for 10 days   Current Medications    BORIC ACID (PH-D) 600 MG VAGINAL SUPPOSITORY    Place 1 suppository (600 mg total) vaginally every evening. As directed    FAMOTIDINE (PEPCID) 20 MG TABLET    TAKE 1 TABLET BY MOUTH TWICE DAILY AS NEEDED FOR STOMACH ACID OR REFLUX OR HEARTBURN    FLUTICASONE PROPIONATE (FLONASE) 50 MCG/ACTUATION NASAL SPRAY    2 sprays by Each Nostril route 2 (two) times daily.    HYDROXYZINE PAMOATE (VISTARIL) 25 MG CAP        HYOSCYAMINE (ANASPAZ,LEVSIN) 0.125 MG TAB    Take 1 tablet (125 mcg total) by mouth every 4 (four) hours as needed (stomach pain).    METHEN-M.BLUE-S.PHOS-PHSAL-HYO (URIBEL) 118-10-40.8-36 MG CAP    TAKE 1 CAPSULE BY MOUTH THREE TIMES DAILY AS NEEDED FOR DYSURIA/BLADDER SPASMS    OXYBUTYNIN (DITROPAN XL) 15 MG TR24    Take 1 tablet (15 mg total) by mouth once daily.    OZEMPIC 2 MG/DOSE (8 MG/3 ML) PNIJ        PANTOPRAZOLE (PROTONIX) 40 MG TABLET        PREGABALIN (LYRICA) 150 MG CAPSULE    Take 150 mg by mouth 2 (two) times daily.    PROAIR HFA 90 MCG/ACTUATION INHALER    INL 2 PFS PO Q 4 H   Discontinued Medications    MELOXICAM (MOBIC) 15 MG TABLET    Take 1 tablet (15 mg total) by mouth once daily.        Objective:     Vitals:    07/26/22 1034   BP: 124/78   Pulse: 85   Resp: 15   SpO2: 100%   Weight: 74.8 kg (165 lb)   Height: 4' 11" (1.499 m)        Physical Exam  Constitutional:       Appearance: Normal appearance. She is normal weight.   HENT:      Head: Normocephalic and atraumatic.      Nose: Nose normal.   Eyes:      Conjunctiva/sclera: Conjunctivae normal. "      Comments: Eyes tracking normal on exam    Neck:      Comments: Tender to palpation over mid neck and paraspinal muscles.  She reports increased pain and tightness with range of motion.  She has full range of motion of bilateral shoulders and elbows.  5+ hand  bilaterally.    Reports pain with range of motion of lumbar spine.  Cardiovascular:      Rate and Rhythm: Normal rate and regular rhythm.      Pulses: Normal pulses.      Heart sounds: Normal heart sounds. No murmur heard.    No friction rub. No gallop.   Pulmonary:      Effort: Pulmonary effort is normal. No respiratory distress.      Breath sounds: Normal breath sounds. No stridor. No wheezing, rhonchi or rales.   Musculoskeletal:      Right lower leg: No edema.      Left lower leg: No edema.      Comments: Moves all extremities well and with good control  Normal gait observed      Skin:     General: Skin is warm and dry.      Capillary Refill: Capillary refill takes less than 2 seconds.   Neurological:      Mental Status: She is alert and oriented to person, place, and time.   Psychiatric:         Mood and Affect: Mood normal.         Behavior: Behavior normal.         Thought Content: Thought content normal.         Judgment: Judgment normal.            Assessment & Plan:     Neuropathic pain  -     Vitamin B12; Future; Expected date: 07/26/2022  -     Methylmalonic Acid, Serum; Future; Expected date: 07/26/2022  -     CBC Auto Differential; Future; Expected date: 07/26/2022    Rheumatoid arthritis involving multiple sites with positive rheumatoid factor  Comments:  Keep follow-up with Rheumatology    Neck pain  -     metaxalone (SKELAXIN) 800 MG tablet; Take 1 tablet (800 mg total) by mouth 3 (three) times daily. for 10 days  Dispense: 30 tablet; Refill: 0  -     diclofenac (VOLTAREN) 50 MG EC tablet; 1 tab 2-3 times a day with a meal  Dispense: 90 tablet; Refill: 1    Back pain, unspecified back location, unspecified back pain laterality,  unspecified chronicity  -     metaxalone (SKELAXIN) 800 MG tablet; Take 1 tablet (800 mg total) by mouth 3 (three) times daily. for 10 days  Dispense: 30 tablet; Refill: 0  -     diclofenac (VOLTAREN) 50 MG EC tablet; 1 tab 2-3 times a day with a meal  Dispense: 90 tablet; Refill: 1           No follow-ups on file.       -Patient instructed that our office calls back on all labs and imaging within 1 week of receiving the results. Patient instructed to reach out to our office if they have not heard from us so that we can request the results from the lab/imaging center           Tameka Dyson PA-C

## 2022-07-27 LAB
ABS NRBC COUNT: 0 X 10 3/UL (ref 0–0.01)
ABSOLUTE BASOPHIL: 0.03 X 10 3/UL (ref 0–0.22)
ABSOLUTE EOSINOPHIL: 0.04 X 10 3/UL (ref 0.04–0.54)
ABSOLUTE IMMATURE GRAN: 0.03 X 10 3/UL (ref 0–0.04)
ABSOLUTE LYMPHOCYTE: 3.3 X 10 3/UL (ref 0.86–4.75)
ABSOLUTE MONOCYTE: 0.6 X 10 3/UL (ref 0.22–1.08)
ADDITIONAL TESTING: NORMAL
B12: 1122 PG/ML (ref 232–1245)
BASOPHILS NFR BLD: 0.4 % (ref 0.2–1.2)
EOSINOPHIL NFR BLD: 0.5 % (ref 0.7–7)
HCT VFR BLD AUTO: 39.2 % (ref 37–47)
HGB BLD-MCNC: 13.3 G/DL (ref 12–16)
IMMATURE GRANULOCYTES: 0.4 % (ref 0–0.5)
LYMPHOCYTES NFR BLD: 38.6 % (ref 19.3–53.1)
MCH RBC QN AUTO: 30.5 PG (ref 27–32)
MCHC RBC AUTO-ENTMCNC: 33.9 G/DL (ref 32–36)
MCV RBC AUTO: 89.9 FL (ref 82–100)
MONOCYTES NFR BLD: 7 % (ref 4.7–12.5)
NEUTROPHILS # BLD AUTO: 4.54 X 10 3/UL (ref 2.15–7.56)
NEUTROPHILS NFR BLD: 53.1 % (ref 34–71.1)
NUCLEATED RED BLOOD CELLS: 0 /100 WBC (ref 0–0.2)
PLATELET # BLD AUTO: 292 X 10 3/UL (ref 135–400)
RBC # BLD AUTO: 4.36 X 10 6/UL (ref 4.2–5.4)
RDW-SD: 42.4 FL (ref 37–54)
WBC # BLD: 8.54 X 10 3/UL (ref 4.3–10.8)

## 2022-07-27 NOTE — PROGRESS NOTES
Subjective:      Patient ID: Rosa Anguiano is a 41 y.o. female.    Chief Complaint: Disease Management    HPI:   Includes joint pain with subjective swelling.   Antecedent event includes: None;  Pain location includes: hands, left shoulder, elbows and back;  Gradual onset; beginning >years ago;  Constant ache burning, Mild in severity,   Lasting >hours, Worse during the daytime;   Improved with none;   Worsened with activity, overuse and stress;     Review of Systems   Constitutional: Positive for fatigue. Negative for chills and fever.   HENT: Positive for mouth sores, sore throat and trouble swallowing. Negative for nasal congestion, ear pain, hearing loss, mouth dryness and nosebleeds.    Eyes: Negative for photophobia, pain, redness, visual disturbance and eye dryness.   Respiratory: Negative for cough and shortness of breath.    Cardiovascular: Negative for chest pain, palpitations and leg swelling.   Gastrointestinal: Positive for diarrhea. Negative for abdominal distention, abdominal pain, blood in stool, constipation, rectal pain, vomiting and fecal incontinence.   Endocrine: Negative for polydipsia and polyuria.   Genitourinary: Positive for dysuria and hematuria. Negative for bladder incontinence, enuresis and genital sores.   Musculoskeletal: Positive for arthralgias, back pain, joint swelling and myalgias.   Integumentary:  Negative for rash, wound and mole/lesion.   Neurological: Positive for weakness and headaches.   Hematological: Positive for adenopathy. Bruises/bleeds easily.   Psychiatric/Behavioral: Positive for sleep disturbance. Negative for dysphoric mood. The patient is nervous/anxious.       Past Medical History:   Diagnosis Date    Acute pelvic pain, female     Breast pain     Cystitis     Diabetes mellitus     Dyspareunia, female     Endometriosis     Fibromyalgia     GERD (gastroesophageal reflux disease)     IBS (irritable bowel syndrome)     Interstitial cystitis     Irregular  menstrual cycle     Leukorrhea     Osteoarthritis     Ovarian cyst     Pneumonia     YAMILET (stress urinary incontinence, female)     Vaginal yeast infection     Vulvitis      Past Surgical History:   Procedure Laterality Date     SECTION      CHOLECYSTECTOMY      LAPAROSCOPY-ASSISTED VAGINAL HYSTERECTOMY      TUBAL LIGATION        See the Assessment/Plan for further characterization of the HPI, ROS, Medical, Surgical, Family, and Social Histories including Tobacco use, Meds; all of which has been reviewed in Epic.    Medication List with Changes/Refills   Current Medications    AMOXICILLIN-CLAVULANATE 875-125MG (AUGMENTIN) 875-125 MG PER TABLET    Take 1 tablet by mouth every 12 (twelve) hours. for 7 days    BORIC ACID (PH-D) 600 MG VAGINAL SUPPOSITORY    Place 1 suppository (600 mg total) vaginally every evening. As directed    FAMOTIDINE (PEPCID) 20 MG TABLET    TAKE 1 TABLET BY MOUTH TWICE DAILY AS NEEDED FOR STOMACH ACID OR REFLUX OR HEARTBURN    FLUCONAZOLE (DIFLUCAN) 150 MG TAB    Take 1 tablet (150 mg total) by mouth once daily. Take one day, repeat in 3 days if not resolved for 1 day    FLUTICASONE PROPIONATE (FLONASE) 50 MCG/ACTUATION NASAL SPRAY    2 sprays by Each Nostril route 2 (two) times daily.    HYDROXYZINE PAMOATE (VISTARIL) 25 MG CAP        HYOSCYAMINE (ANASPAZ,LEVSIN) 0.125 MG TAB    Take 1 tablet (125 mcg total) by mouth every 4 (four) hours as needed (stomach pain).    METHEN-M.BLUE-S.PHOS-PHSAL-HYO (URIBEL) 118-10-40.8-36 MG CAP    TAKE 1 CAPSULE BY MOUTH THREE TIMES DAILY AS NEEDED FOR DYSURIA/BLADDER SPASMS    OXYBUTYNIN (DITROPAN XL) 15 MG TR24    Take 1 tablet (15 mg total) by mouth once daily.    OZEMPIC 2 MG/DOSE (8 MG/3 ML) PNIJ        PANTOPRAZOLE (PROTONIX) 40 MG TABLET        PROAIR HFA 90 MCG/ACTUATION INHALER    INL 2 PFS PO Q 4 H    VALACYCLOVIR (VALTREX) 1000 MG TABLET    Take 1 tablet (1,000 mg total) by mouth 3 (three) times daily. for 7 days   Changed and/or  "Refilled Medications    Modified Medication Previous Medication    DICLOFENAC (VOLTAREN) 50 MG EC TABLET diclofenac (VOLTAREN) 50 MG EC tablet       Take 1 tablet (50 mg total) by mouth 2 (two) times daily as needed (pain).    1 tab 2-3 times a day with a meal    PREGABALIN (LYRICA) 150 MG CAPSULE pregabalin (LYRICA) 150 MG capsule       Take 1 capsule (150 mg total) by mouth 2 (two) times daily.    Take 150 mg by mouth 2 (two) times daily.   Discontinued Medications    FLUCONAZOLE (DIFLUCAN) 150 MG TAB    TAKE 1 TABLET BY MOUTH AS A SINGLE DOSE         Objective:   /76   Pulse 77   Resp 16   Ht 4' 11" (1.499 m)   Wt 75.4 kg (166 lb 3.2 oz)   SpO2 99%   BMI 33.57 kg/m²   Physical Exam  Non-toxic appearance. No distress.   Normocephalic and atraumatic.   External ears normal.    Conjunctivae and EOM are normal. No scleral icterus.   OP clear, some dry  No salivary gland enlargement or tenderness.  No submental, no submandibular, no preauricular, nor cervical adenopathy.   No JVD. No carotid bruit. No thyromegaly.   RRR, No friction rub; palpable distal pulses.    No tachypnea or signs of respiratory distress.   Abdominal: No guarding or rebound tenderness.   Neurological: Oriented. Normal thought content. No cranial nerve deficit. Strength is grossly normal without focal deficit.  Skin is warm. No pallor.   Musculoskeletal: see below for further input. No overt synovitis.     X-Ray Abdomen AP 1 View  Narrative: EXAMINATION:  XR ABDOMEN AP 1 VIEW    CLINICAL HISTORY:  Calculus of kidney    TECHNIQUE:  Single AP View of the abdomen was performed.    COMPARISON:  None    FINDINGS:  The bowel gas pattern is nonspecific and nonobstructive.  No indirect evidence for free air.  No pathologic calcifications.Surgical clips noted in the right upper quadrant of the abdomen.  Moderate to prominent stool seen within the distal half of the transverse colon and the descending colon.  No definitive calcification seen " overlying either kidney.  Calcification seen overlying the right hemipelvis most consistent with phleboliths.  Impression: As above    Electronically signed by: Darin Emerson DO  Date:    07/19/2022  Time:    08:32    Office Visit on 07/26/2022   Component Date Value Ref Range Status    B12 07/27/2022 1,122  232 - 1,245 pg/mL Final    Comment: NOTE  Testing performed at:  [facility], [address] [place] [address] CLIA #:[id number]      Methylmalonic Acid 07/27/2022 108  87 - 318 nmol/L Final    Comment:   This test was developed and its analytical performance  characteristics have been determined by [facility]. It has not been  cleared or approved by FDA. This assay has been validated  pursuant to the CLIA regulations and is used for clinical  purposes.  NOTE  Testing performed at:  [facility], [address] [place] [id number] CLIA#:[id number]      WBC 07/27/2022 8.54  4.3 - 10.8 X 10 3/ul Final    RBC 07/27/2022 4.36  4.2 - 5.4 X 10 6/ul Final    RDW-SD 07/27/2022 42.4  37 - 54 fl Final    Hemoglobin 07/27/2022 13.3  12 - 16 g/dL Final    Hematocrit 07/27/2022 39.2  37 - 47 % Final    MCV 07/27/2022 89.9  82 - 100 fl Final    MCH 07/27/2022 30.5  27 - 32 pg Final    MCHC 07/27/2022 33.9  32 - 36 g/dL Final    Platelets 07/27/2022 292  135 - 400 X 10 3/ul Final    Neutrophils 07/27/2022 53.1  34 - 71.1 % Final    Lymphocytes 07/27/2022 38.6  19.3 - 53.1 % Final    Monocytes 07/27/2022 7.0  4.7 - 12.5 % Final    Eosinophils 07/27/2022 0.5 (A) 0.7 - 7.0 % Final    Basophils 07/27/2022 0.4  0.2 - 1.2 % Final    Neutrophils Absolute 07/27/2022 4.54  2.15 - 7.56 X 10 3/ul Final    Lymphocytes Absolute 07/27/2022 3.30  0.86 - 4.75 X 10 3/ul Final    Monocytes Absolute 07/27/2022 0.60  0.22 - 1.08 X 10 3/ul Final    Eosinophils Absolute 07/27/2022 0.04  0.04 - 0.54 X 10 3/ul Final    Basophils Absolute  07/27/2022 0.03  0.00 - 0.22 X 10 3/ul Final    Immature Granulocytes Absolute 07/27/2022 0.03  0 - 0.04 X 10 3/ul Final    Immature Granulocytes 07/27/2022 0.4  0 - 0.5 % Final    IG includes metamyelocytes, myelocytes, and promyelocytes    nRBC# 07/27/2022 0.0  0 - 0.2 /100 WBC Final    nRBC Count Absolute 07/27/2022 0.000  0 - 0.012 x 10 3/ul Final    Comment: NOTE  Testing performed at:  [facility], [address] [place] [address]  [address] CLIA #:[id number]      Additional Testing 07/27/2022 SEE BELOW   Final    Comment: Additional testing was collected and sent to a reference lab. Results  may take 7 days to 2 weeks before they are final. Reports will be sent  to ordering client via fax, pathviewer, mail, interface or   delivered.  NOTE  Testing performed at:  [facility], [address] [place] [address]  [address] CLIA #:[id number]     Patient Outreach on 07/18/2022   Component Date Value Ref Range Status    Cholesterol 07/14/2022 216 (A) 100 - 200 mg/dL Final    Triglycerides 07/14/2022 141  0 - 150 mg/dL Final    HDL 07/14/2022 50 (A) >60 Final    LDL Cholesterol 07/14/2022 138 (A) 0 - 100 mg/dL Final    LDL/HDL Ratio 07/14/2022 2.8  1.0 - 3.0 Final    Hemoglobin A1C 06/01/2022 6.6 (A) 4.0 - 6.0 % Final    Glucose 06/01/2022 103  74 - 106 mg/dL Final    BUN 06/01/2022 9.9  6 - 20 mg/dL Final    Creatinine 06/01/2022 0.5  0.5 - 0.9 mg/dL Final    AST 06/01/2022 16  0 - 32 U/L Final    ALT 06/01/2022 28  0 - 33 U/L Final    Alkaline Phosphatase 06/01/2022 91  35 - 105 U/L Final    Calcium 06/01/2022 9.1  8.6 - 10.2 mg/dL Final    Total Protein 06/01/2022 7.6  6.4 - 8.3 g/dL Final    Albumin 06/01/2022 4.4  3.5 - 5.2 g/dL Final    Total Bilirubin 06/01/2022 0.4  0.0 - 1.2 mg/dL Final    Sodium 06/01/2022 138  136 - 145 mmol/L Final    Potassium 06/01/2022 3.8  3.5 - 5.1 mmol/L Final    Chloride 06/01/2022 104  98 - 107 mmol/L Final    CO2 06/01/2022 25  22 -  29 mmol/L Final    Globulin 06/01/2022 3.2  1.5 - 4.5 Final    Albumin/Globulin Ratio 06/01/2022 1.4  1.0 - 2.7 Final    BUN/CREAT RATIO 06/01/2022 18.7  6 - 20 Final    GFR ESTIMATION 06/01/2022 126.51  >60.00 Final    Anion Gap 06/01/2022 9  8 - 17 mmol/L Final   Office Visit on 07/18/2022   Component Date Value Ref Range Status    Color, UA 07/18/2022 Green/Blue   Final    pH, UA 07/18/2022 5.5   Final    WBC, UA 07/18/2022 Negative   Final    Nitrite, UA 07/18/2022 Negative   Final    Protein, POC 07/18/2022 Negative   Final    Glucose, UA 07/18/2022 Negative   Final    Ketones, UA 07/18/2022 Negative   Final    Urobilinogen, UA 07/18/2022 0.2   Final    Bilirubin, POC 07/18/2022 Negative   Final    Blood, UA 07/18/2022 Trace-Intact   Final    Clarity, UA 07/18/2022 Clear   Final    Spec Grav UA 07/18/2022 1.030   Final    Urine Culture, Routine 07/18/2022    Final    Comment: Source: URINE  Site:  Organism #1                    MIXED ORGANISMS, NO SUSCEPTIBILITY  Quantity                      25,000    Mixed ilene in a urine culture means there are several different types  of bacteria present, which is usually indicative of contamination of  the urine.  NOTE  Testing performed at:  [facility], [address] [place] [address] CLIA #:[id number]     Orders Only on 06/27/2022   Component Date Value Ref Range Status    POC Glucose 06/27/2022 113 (A) 70 - 105 Final   Orders Only on 06/24/2022   Component Date Value Ref Range Status    Specimen Collection Method 06/24/2022 Cl Catch Mid Stream   Final    Color, UA 06/24/2022 Christine (A) Yellow Final    Appearance, UA 06/24/2022 Clear  Clear Final    pH, UA 06/24/2022 5.0  5.0 - 9.0 pH Final    Specific Gravity, UA 06/24/2022 1.020  1.000 - 1.030 Final    Protein, UA 06/24/2022 Negative  Negative mg/dL Final    Glucose, UA 06/24/2022 Normal  Normal mg/dL Final    Ketones, UA 06/24/2022 Negative  Negative mg/dL Final     Occult Blood UA 06/24/2022 10 (A) Negative /uL Final    Nitrite, UA 06/24/2022 Negative  Negative Final    Bilirubin (UA) 06/24/2022 Negative  Negative mg/dL Final    Urobilinogen, UA 06/24/2022 Normal  Normal mg/dL Final    Leukocytes, UA 06/24/2022 Negative  Negative /uL Final    RBC, UA 06/24/2022 Rare  0 - 2 /HPF Final    WBC, UA 06/24/2022 Rare  0 - 5 /HPF Final    Squam Epithel, UA 06/24/2022 2+ Moderate  /LPF Final    Ca Oxalate Swetha, UA 06/24/2022 2+ Moderate (A) None Seen /LPF Final    Bacteria 06/24/2022 1+ Few  Few/HPF /HPF Final    Service Comment 03 06/24/2022 No, Criteria Not Met   Final   Orders Only on 06/24/2022   Component Date Value Ref Range Status    Sodium 06/24/2022 139  135 - 145 mmol/L Final    Potassium 06/24/2022 3.9  3.6 - 5.2 mmol/L Final    Chloride 06/24/2022 105  100 - 108 mmol/L Final    CO2 06/24/2022 27  21 - 32 mmol/L Final    Anion Gap 06/24/2022 7.0  3.0 - 11.0 mmol/L Final    BUN 06/24/2022 10  7 - 18 mg/dL Final    Creatinine 06/24/2022 0.55  0.55 - 1.02 mg/dL Final    GFR ESTIMATION 06/24/2022 > 60  >60 Final    If patient is , multiply result by 1.212.           DESCRIPTION       GLOMERULAR FILTRATION RATE             NORMAL                     >60             KIDNEY  DISEASE           15-60             KIDNEY FAILURE             <15    BUN/Creatinine Ratio 06/24/2022 18.18 (A) 7 - 18 Ratio Final    Glucose 06/24/2022 158 (A) 70 - 110 mg/dL Final    Comment: Level 2 hypoglycemia (blood glucose 40-53) has beenassociated with impaired cognitive function, reducedhypoglycemia awareness following repeated episodes,progression to severe hypoclycemia, cardiac arrhythmias, andfuture mortality.Limiting the critical   value for blood glucose to <40 mg/dLmeans overlooking patients with level 2 hypoglycemia (bloodglucose 40-53 mg/dL) who may experience severe hypoglycemia.The new adult critical value for blood glucose approved [facility] is <54 mg/dL.       Calcium 06/24/2022 8.7 (A) 8.8 - 10.5 mg/dL Final   Orders Only on 06/24/2022   Component Date Value Ref Range Status    WBC 06/24/2022 7.8  4.6 - 10.2 10*3/uL Final    RBC 06/24/2022 4.23  4.2 - 5.4 10*6/uL Final    Hemoglobin 06/24/2022 12.7  12.0 - 16.0 g/dL Final    Hematocrit 06/24/2022 37.6  37.0 - 47.0 % Final    MCV 06/24/2022 88.9  80 - 97 fL Final    MCH 06/24/2022 30.0  27.0 - 31.2 pg Final    MCHC 06/24/2022 33.8  31.8 - 35.4 g/dL Final    RDW RBC Auto-Rto 06/24/2022 13.2  12.5 - 18.0 % Final    Platelets 06/24/2022 296  142 - 424 10*3/uL Final    Neutrophils 06/24/2022 60.0  37 - 80 % Final    Neutrophils Absolute 06/24/2022 4.8  1.8 - 7.7 10*3/uL Final    Bands 06/24/2022 2.0  0 - 5 % Final    Lymphocytes 06/24/2022 32.0  25 - 40 % Final    Monocytes 06/24/2022 6.0  1 - 15 % Final    Cells Counted 06/24/2022 100   Final    Platelet Estimate 06/24/2022 Adequate   Final    RBC Morphology 06/24/2022 N/N   Final   Office Visit on 04/26/2022   Component Date Value Ref Range Status    Trichomonas DNA 04/26/2022 NOT DETECTED  NOT DETECTED Final    Candida Group DNA 04/26/2022 NOT DETECTED  NOT DETECTED Final    Comment: Candida group includes albicans, tropicalis, parapsilosis and  dubliniensis.      Jamee glabrata DNA 04/26/2022 NOT DETECTED  NOT DETECTED Final    Jamee krusei DNA 04/26/2022 NOT DETECTED  NOT DETECTED Final    Vaginosis Panel DNA 04/26/2022 NOT DETECTED  NOT DETECTED Final    Comment: Vaginosis panel DNA Not Detected: Detection of marker combinations  related to normal vaginal ilene.  NOTE  Testing performed at:  [facility], [address] [place] [address] CLIA #:[id number]     Office Visit on 04/20/2022   Component Date Value Ref Range Status    POC Residual Urine Volume 04/20/2022 0  0 - 100 mL Final   Office Visit on 09/23/2021   Component Date Value Ref Range Status    Color, UA 09/23/2021 Yellow   Final    WBC, UA 09/23/2021 Negative    Final    Nitrite, UA 09/23/2021 Negative   Final    Blood, UA 09/23/2021 Negative   Final    Clarity, UA 09/23/2021 Clear   Final   There may be more visits with results that are not included.        All of the data above and below has been reviewed by myself and any further interpretations will be reflected in the assessment and plan.   The data includes review of external notes, and independent interpretation of lab results, x-rays, and imaging reports.  Active inflammatory arthritis is an illness that poses a threat to bodily function and even a threat to life in some cases.  Drug therapy to treat inflammatory arthritis usually requires intensive monitoring for toxicity.    Review of patient's allergies indicates:   Allergen Reactions    Trulicity [dulaglutide]      Vomiting     Assessment/Plan:       Addendum 8/10/22 2:50pm: she called stating the lyrica no longer works for her so she is maybe going to stop it. noted    There is some history to suggest inflammatory arthritis.    Rheumatoid arthritis and fibromyalgia per records    previously followed by Dr. Wright    What has she been treated with?    We will seek records from Dr. Wright    She tells me Dr. Wright would not refill her diclofenac for unknown reason; no known kidney problem    She sounds like she was also started on MTX 4/week with folic acid that did not help so she stopped it    There is also some mechanical and neurogenic pain.    She has chronic numbness in head that she was told was from shingles on 4 different occasions and had treatment but never had a rash or skin lesion noted    Chronic neck and back pain    She tells me she is also working with Physical Medicine and Rehab [facility] if not already    Prior CHERI that did not help    Also had seen Orthopedics at [facility] s/p elbow injection per patient    No overt synovitis    Verbal education    Likely mechanical and myofascial + neurogenic pain;  No overt active inflammatory arthritis but she  had been on treatment MTX per patient    Blood work to further evaluate    We will seek records from Dr. Wright and records from Orthopedics and [facility] at [facility] [place]    Resume voltaren      Has been managing with:    Lyrica 150mg bid    Prior voltaren 50mg we will resume    Prior skelaxin    Has been working with:    Records Tameka PHILIP some of note 7/27/22:  ((Follow-up (Patient here with c/o back pain, generalized body aches, and skin pain (burning sensation.//1. Back pain-awaiting authorization for MRI's/2. Generalized body aches-patient currently seeing Dr. Wright, but is just being given pain medications instead of a treatment plan per patient. She is seeing Cristobal in August to establish care./3. Skin pain-has been dx with shingles in the past with no lesions/rash. Verbalizes the pain is the same.), Back Pain, Generalized Body Aches, and burning sensation to skin     HPI   Patient is here today for follow-up and new complaints.     Patient reports burning sensation and stinging sensation over her skin.  Reports she has a history of fibromyalgia and RA.  She was seen by Dr. Wright in the past but has an upcoming appointment with Dr. Jain in August.  Patient currently takes Lyrica.     Neck and back pain-patient reports chronic pain and muscle tension in her neck and back.  She is awaiting approval for 2 are eyes.  She currently takes Mobic daily without relief.     Pt has completed 8 weeks of physical therapy for her neck and back without relief.       Assessment & Plan:      Neuropathic pain  -     Vitamin B12; Future; Expected date: 07/26/2022  -     Methylmalonic Acid, Serum; Future; Expected date: 07/26/2022  -     CBC Auto Differential; Future; Expected date: 07/26/2022     Rheumatoid arthritis involving multiple sites with positive rheumatoid factor  Comments:  Keep follow-up with Rheumatology     Neck pain  -     metaxalone (SKELAXIN) 800 MG tablet; Take 1 tablet (800 mg total) by  mouth 3 (three) times daily. for 10 days  Dispense: 30 tablet; Refill: 0  -     diclofenac (VOLTAREN) 50 MG EC tablet; 1 tab 2-3 times a day with a meal  Dispense: 90 tablet; Refill: 1     Back pain, unspecified back location, unspecified back pain laterality, unspecified chronicity  -     metaxalone (SKELAXIN) 800 MG tablet; Take 1 tablet (800 mg total) by mouth 3 (three) times daily. for 10 days  Dispense: 30 tablet; Refill: 0  -     diclofenac (VOLTAREN) 50 MG EC tablet; 1 tab 2-3 times a day with a meal  Dispense: 90 tablet; Refill: 1))     Review of medical records as stated above.  Lab +/- imaging and other ordered diagnostic studies to further evaluate.  See the orders associated with this note visit.  Medications as prescribed as tolerated.    Education including verbal and those noted in the patient instructions.  Revisit as scheduled and call prn.    The following diagnoses influence medical decision making and/or need further workup including the orders listed below:    Pain in other joint  -     JACOB by IFA, w/Rflx; Future; Expected date: 08/10/2022  -     Protein Electrophoresis, Serum; Future; Expected date: 08/10/2022  -     Quantiferon Gold TB; Future; Expected date: 08/10/2022  -     Rheumatoid Factor; Future; Expected date: 08/10/2022  -     Sedimentation rate; Future; Expected date: 08/10/2022  -     Uric Acid; Future; Expected date: 08/10/2022  -     Urinalysis; Future; Expected date: 08/10/2022  -     Cyclic Citrullinated Peptide Antibody, IgG; Future; Expected date: 08/10/2022  -     C-Reactive Protein; Future; Expected date: 08/10/2022  -     Comprehensive Metabolic Panel; Future; Expected date: 08/10/2022  -     CK; Future; Expected date: 08/10/2022  -     CBC Auto Differential; Future; Expected date: 08/10/2022  -     C4 Complement; Future; Expected date: 08/10/2022  -     C3 Complement; Future; Expected date: 08/10/2022  -     Hepatitis B Surface Antigen; Future; Expected date: 08/10/2022  -      Hepatitis B Core Antibody, Total; Future; Expected date: 08/10/2022  -     Hepatitis B Surface Ab, Qualitative; Future; Expected date: 08/10/2022  -     Hepatitis C Antibody; Future; Expected date: 08/10/2022  -     diclofenac (VOLTAREN) 50 MG EC tablet; Take 1 tablet (50 mg total) by mouth 2 (two) times daily as needed (pain).  Dispense: 60 tablet; Refill: 1    Polyarthralgia  -     JACOB by IFA, w/Rflx; Future; Expected date: 08/10/2022  -     Protein Electrophoresis, Serum; Future; Expected date: 08/10/2022  -     Quantiferon Gold TB; Future; Expected date: 08/10/2022  -     Rheumatoid Factor; Future; Expected date: 08/10/2022  -     Sedimentation rate; Future; Expected date: 08/10/2022  -     Uric Acid; Future; Expected date: 08/10/2022  -     Urinalysis; Future; Expected date: 08/10/2022  -     Cyclic Citrullinated Peptide Antibody, IgG; Future; Expected date: 08/10/2022  -     C-Reactive Protein; Future; Expected date: 08/10/2022  -     Comprehensive Metabolic Panel; Future; Expected date: 08/10/2022  -     CK; Future; Expected date: 08/10/2022  -     CBC Auto Differential; Future; Expected date: 08/10/2022  -     C4 Complement; Future; Expected date: 08/10/2022  -     C3 Complement; Future; Expected date: 08/10/2022  -     Hepatitis B Surface Antigen; Future; Expected date: 08/10/2022  -     Hepatitis B Core Antibody, Total; Future; Expected date: 08/10/2022  -     Hepatitis B Surface Ab, Qualitative; Future; Expected date: 08/10/2022  -     Hepatitis C Antibody; Future; Expected date: 08/10/2022  -     diclofenac (VOLTAREN) 50 MG EC tablet; Take 1 tablet (50 mg total) by mouth 2 (two) times daily as needed (pain).  Dispense: 60 tablet; Refill: 1    Myalgia  -     JACOB by IFA, w/Rflx; Future; Expected date: 08/10/2022  -     Protein Electrophoresis, Serum; Future; Expected date: 08/10/2022  -     Quantiferon Gold TB; Future; Expected date: 08/10/2022  -     Rheumatoid Factor; Future; Expected date:  08/10/2022  -     Sedimentation rate; Future; Expected date: 08/10/2022  -     Uric Acid; Future; Expected date: 08/10/2022  -     Urinalysis; Future; Expected date: 08/10/2022  -     Cyclic Citrullinated Peptide Antibody, IgG; Future; Expected date: 08/10/2022  -     C-Reactive Protein; Future; Expected date: 08/10/2022  -     Comprehensive Metabolic Panel; Future; Expected date: 08/10/2022  -     CK; Future; Expected date: 08/10/2022  -     CBC Auto Differential; Future; Expected date: 08/10/2022  -     C4 Complement; Future; Expected date: 08/10/2022  -     C3 Complement; Future; Expected date: 08/10/2022  -     Hepatitis B Surface Antigen; Future; Expected date: 08/10/2022  -     Hepatitis B Core Antibody, Total; Future; Expected date: 08/10/2022  -     Hepatitis B Surface Ab, Qualitative; Future; Expected date: 08/10/2022  -     Hepatitis C Antibody; Future; Expected date: 08/10/2022  -     pregabalin (LYRICA) 150 MG capsule; Take 1 capsule (150 mg total) by mouth 2 (two) times daily.  Dispense: 60 capsule; Refill: 1  -     diclofenac (VOLTAREN) 50 MG EC tablet; Take 1 tablet (50 mg total) by mouth 2 (two) times daily as needed (pain).  Dispense: 60 tablet; Refill: 1    Neuralgia  -     pregabalin (LYRICA) 150 MG capsule; Take 1 capsule (150 mg total) by mouth 2 (two) times daily.  Dispense: 60 capsule; Refill: 1    Screening for rheumatic disorder  -     JACOB by IFA, w/Rflx; Future; Expected date: 08/10/2022  -     Protein Electrophoresis, Serum; Future; Expected date: 08/10/2022  -     Quantiferon Gold TB; Future; Expected date: 08/10/2022  -     Rheumatoid Factor; Future; Expected date: 08/10/2022  -     Sedimentation rate; Future; Expected date: 08/10/2022  -     Uric Acid; Future; Expected date: 08/10/2022  -     Urinalysis; Future; Expected date: 08/10/2022  -     Cyclic Citrullinated Peptide Antibody, IgG; Future; Expected date: 08/10/2022  -     C-Reactive Protein; Future; Expected date: 08/10/2022  -      Comprehensive Metabolic Panel; Future; Expected date: 08/10/2022  -     CK; Future; Expected date: 08/10/2022  -     CBC Auto Differential; Future; Expected date: 08/10/2022  -     C4 Complement; Future; Expected date: 08/10/2022  -     C3 Complement; Future; Expected date: 08/10/2022  -     Hepatitis B Surface Antigen; Future; Expected date: 08/10/2022  -     Hepatitis B Core Antibody, Total; Future; Expected date: 08/10/2022  -     Hepatitis B Surface Ab, Qualitative; Future; Expected date: 08/10/2022  -     Hepatitis C Antibody; Future; Expected date: 08/10/2022    Screening-pulmonary TB  -     JACOB by IFA, w/Rflx; Future; Expected date: 08/10/2022  -     Protein Electrophoresis, Serum; Future; Expected date: 08/10/2022  -     Quantiferon Gold TB; Future; Expected date: 08/10/2022  -     Rheumatoid Factor; Future; Expected date: 08/10/2022  -     Sedimentation rate; Future; Expected date: 08/10/2022  -     Uric Acid; Future; Expected date: 08/10/2022  -     Urinalysis; Future; Expected date: 08/10/2022  -     Cyclic Citrullinated Peptide Antibody, IgG; Future; Expected date: 08/10/2022  -     C-Reactive Protein; Future; Expected date: 08/10/2022  -     Comprehensive Metabolic Panel; Future; Expected date: 08/10/2022  -     CK; Future; Expected date: 08/10/2022  -     CBC Auto Differential; Future; Expected date: 08/10/2022  -     C4 Complement; Future; Expected date: 08/10/2022  -     C3 Complement; Future; Expected date: 08/10/2022  -     Hepatitis B Surface Antigen; Future; Expected date: 08/10/2022  -     Hepatitis B Core Antibody, Total; Future; Expected date: 08/10/2022  -     Hepatitis B Surface Ab, Qualitative; Future; Expected date: 08/10/2022  -     Hepatitis C Antibody; Future; Expected date: 08/10/2022    Encounter for hepatitis C screening test for low risk patient  -     JACOB by IFA, w/Rflx; Future; Expected date: 08/10/2022  -     Protein Electrophoresis, Serum; Future; Expected date: 08/10/2022  -      Quantiferon Gold TB; Future; Expected date: 08/10/2022  -     Rheumatoid Factor; Future; Expected date: 08/10/2022  -     Sedimentation rate; Future; Expected date: 08/10/2022  -     Uric Acid; Future; Expected date: 08/10/2022  -     Urinalysis; Future; Expected date: 08/10/2022  -     Cyclic Citrullinated Peptide Antibody, IgG; Future; Expected date: 08/10/2022  -     C-Reactive Protein; Future; Expected date: 08/10/2022  -     Comprehensive Metabolic Panel; Future; Expected date: 08/10/2022  -     CK; Future; Expected date: 08/10/2022  -     CBC Auto Differential; Future; Expected date: 08/10/2022  -     C4 Complement; Future; Expected date: 08/10/2022  -     C3 Complement; Future; Expected date: 08/10/2022  -     Hepatitis B Surface Antigen; Future; Expected date: 08/10/2022  -     Hepatitis B Core Antibody, Total; Future; Expected date: 08/10/2022  -     Hepatitis B Surface Ab, Qualitative; Future; Expected date: 08/10/2022  -     Hepatitis C Antibody; Future; Expected date: 08/10/2022      Follow up in about 4 weeks (around 9/7/2022).     David Jain MD

## 2022-08-03 ENCOUNTER — OFFICE VISIT (OUTPATIENT)
Dept: FAMILY MEDICINE | Facility: CLINIC | Age: 42
End: 2022-08-03
Payer: COMMERCIAL

## 2022-08-03 VITALS
WEIGHT: 165 LBS | DIASTOLIC BLOOD PRESSURE: 58 MMHG | HEART RATE: 81 BPM | OXYGEN SATURATION: 98 % | BODY MASS INDEX: 33.26 KG/M2 | SYSTOLIC BLOOD PRESSURE: 126 MMHG | HEIGHT: 59 IN

## 2022-08-03 DIAGNOSIS — M05.79 RHEUMATOID ARTHRITIS INVOLVING MULTIPLE SITES WITH POSITIVE RHEUMATOID FACTOR: ICD-10-CM

## 2022-08-03 DIAGNOSIS — E11.69 TYPE 2 DIABETES MELLITUS WITH OTHER SPECIFIED COMPLICATION, WITHOUT LONG-TERM CURRENT USE OF INSULIN: ICD-10-CM

## 2022-08-03 DIAGNOSIS — H66.91 RIGHT OTITIS MEDIA, UNSPECIFIED OTITIS MEDIA TYPE: Primary | ICD-10-CM

## 2022-08-03 LAB — METHYLMALONATE SERPL-SCNC: 108 NMOL/L (ref 87–318)

## 2022-08-03 PROCEDURE — 99214 OFFICE O/P EST MOD 30 MIN: CPT | Mod: 25,S$GLB,, | Performed by: FAMILY MEDICINE

## 2022-08-03 PROCEDURE — 3078F DIAST BP <80 MM HG: CPT | Mod: CPTII,S$GLB,, | Performed by: FAMILY MEDICINE

## 2022-08-03 PROCEDURE — 3074F PR MOST RECENT SYSTOLIC BLOOD PRESSURE < 130 MM HG: ICD-10-PCS | Mod: CPTII,S$GLB,, | Performed by: FAMILY MEDICINE

## 2022-08-03 PROCEDURE — 3008F PR BODY MASS INDEX (BMI) DOCUMENTED: ICD-10-PCS | Mod: CPTII,S$GLB,, | Performed by: FAMILY MEDICINE

## 2022-08-03 PROCEDURE — 96372 THER/PROPH/DIAG INJ SC/IM: CPT | Mod: S$GLB,,, | Performed by: FAMILY MEDICINE

## 2022-08-03 PROCEDURE — 3044F PR MOST RECENT HEMOGLOBIN A1C LEVEL <7.0%: ICD-10-PCS | Mod: CPTII,S$GLB,, | Performed by: FAMILY MEDICINE

## 2022-08-03 PROCEDURE — 3074F SYST BP LT 130 MM HG: CPT | Mod: CPTII,S$GLB,, | Performed by: FAMILY MEDICINE

## 2022-08-03 PROCEDURE — 3078F PR MOST RECENT DIASTOLIC BLOOD PRESSURE < 80 MM HG: ICD-10-PCS | Mod: CPTII,S$GLB,, | Performed by: FAMILY MEDICINE

## 2022-08-03 PROCEDURE — 96372 PR INJECTION,THERAP/PROPH/DIAG2ST, IM OR SUBCUT: ICD-10-PCS | Mod: S$GLB,,, | Performed by: FAMILY MEDICINE

## 2022-08-03 PROCEDURE — 1160F PR REVIEW ALL MEDS BY PRESCRIBER/CLIN PHARMACIST DOCUMENTED: ICD-10-PCS | Mod: CPTII,S$GLB,, | Performed by: FAMILY MEDICINE

## 2022-08-03 PROCEDURE — 99214 PR OFFICE/OUTPT VISIT, EST, LEVL IV, 30-39 MIN: ICD-10-PCS | Mod: 25,S$GLB,, | Performed by: FAMILY MEDICINE

## 2022-08-03 PROCEDURE — 1159F MED LIST DOCD IN RCRD: CPT | Mod: CPTII,S$GLB,, | Performed by: FAMILY MEDICINE

## 2022-08-03 PROCEDURE — 3008F BODY MASS INDEX DOCD: CPT | Mod: CPTII,S$GLB,, | Performed by: FAMILY MEDICINE

## 2022-08-03 PROCEDURE — 3044F HG A1C LEVEL LT 7.0%: CPT | Mod: CPTII,S$GLB,, | Performed by: FAMILY MEDICINE

## 2022-08-03 PROCEDURE — 1160F RVW MEDS BY RX/DR IN RCRD: CPT | Mod: CPTII,S$GLB,, | Performed by: FAMILY MEDICINE

## 2022-08-03 PROCEDURE — 1159F PR MEDICATION LIST DOCUMENTED IN MEDICAL RECORD: ICD-10-PCS | Mod: CPTII,S$GLB,, | Performed by: FAMILY MEDICINE

## 2022-08-03 RX ORDER — CEFTRIAXONE 1 G/1
1 INJECTION, POWDER, FOR SOLUTION INTRAMUSCULAR; INTRAVENOUS
Status: COMPLETED | OUTPATIENT
Start: 2022-08-03 | End: 2022-08-03

## 2022-08-03 RX ORDER — AMOXICILLIN AND CLAVULANATE POTASSIUM 875; 125 MG/1; MG/1
1 TABLET, FILM COATED ORAL EVERY 12 HOURS
Qty: 14 TABLET | Refills: 0 | Status: SHIPPED | OUTPATIENT
Start: 2022-08-03 | End: 2022-08-10

## 2022-08-03 RX ORDER — KETOROLAC TROMETHAMINE 30 MG/ML
30 INJECTION, SOLUTION INTRAMUSCULAR; INTRAVENOUS
Status: COMPLETED | OUTPATIENT
Start: 2022-08-03 | End: 2022-08-03

## 2022-08-03 RX ADMIN — KETOROLAC TROMETHAMINE 30 MG: 30 INJECTION, SOLUTION INTRAMUSCULAR; INTRAVENOUS at 02:08

## 2022-08-03 RX ADMIN — CEFTRIAXONE 1 G: 1 INJECTION, POWDER, FOR SOLUTION INTRAMUSCULAR; INTRAVENOUS at 02:08

## 2022-08-03 NOTE — PROGRESS NOTES
Subjective:       Patient ID: Rosa Anguiano is a 41 y.o. female.    Chief Complaint: Otalgia (Patient is here for right ear pain and a lil drainage )    HPI     New prob and lab review  Reports ear pain and drainage since yesterday as well as some tenderness and sensitivity around this area   DM - last a1c 6.6, ozempic dose was increased to 2 mg and she has lost 7 lbs in the past month. Due for recheck a1c in 2 mo  She has upcoming appt with dr yo to est for inflammatory arthritis - has AVISE panel julianna ordered pending    Review of Systems   Constitutional: Negative for chills, diaphoresis, fatigue, fever and unexpected weight change.   HENT: Positive for ear discharge and ear pain. Negative for nasal congestion, hearing loss, rhinorrhea, sinus pressure/congestion, sore throat, trouble swallowing and voice change.    Eyes: Negative for pain and visual disturbance.   Respiratory: Negative for cough, chest tightness, shortness of breath and wheezing.    Cardiovascular: Negative for chest pain, palpitations and leg swelling.   Gastrointestinal: Negative for abdominal pain, constipation, diarrhea, nausea and vomiting.   Endocrine: Negative for polydipsia and polyuria.   Genitourinary: Negative for decreased urine volume, dysuria, flank pain, frequency, hematuria, pelvic pain, vaginal bleeding and vaginal discharge.   Musculoskeletal: Positive for arthralgias, back pain, myalgias and neck pain.   Integumentary:  Negative for color change, pallor and rash.   Neurological: Negative for dizziness, syncope, weakness, light-headedness and headaches.   Hematological: Negative for adenopathy.   Psychiatric/Behavioral: Negative for decreased concentration, dysphoric mood and sleep disturbance. The patient is not nervous/anxious.          Objective:      Physical Exam  Vitals reviewed.   Constitutional:       General: She is not in acute distress.     Appearance: She is well-developed. She is not diaphoretic.   HENT:      Head:  Normocephalic and atraumatic.      Right Ear: Tympanic membrane is erythematous and bulging. Tympanic membrane is not perforated.      Left Ear: Tympanic membrane is not erythematous.      Nose: Nose normal.   Eyes:      Conjunctiva/sclera: Conjunctivae normal.      Pupils: Pupils are equal, round, and reactive to light.   Neck:      Thyroid: No thyromegaly.      Vascular: No JVD.   Cardiovascular:      Rate and Rhythm: Normal rate and regular rhythm.      Pulses: Normal pulses.      Heart sounds: Normal heart sounds. No murmur heard.    No friction rub. No gallop.   Pulmonary:      Effort: Pulmonary effort is normal. No respiratory distress.      Breath sounds: Normal breath sounds. No stridor. No wheezing or rales.   Abdominal:      General: Bowel sounds are normal. There is no distension.      Palpations: Abdomen is soft.      Tenderness: There is no abdominal tenderness.   Musculoskeletal:         General: No tenderness.      Cervical back: Normal range of motion and neck supple.      Right lower leg: No edema.      Left lower leg: No edema.   Lymphadenopathy:      Cervical: No cervical adenopathy.   Skin:     General: Skin is warm and dry.      Coloration: Skin is not pale.      Findings: No erythema or rash.   Neurological:      Mental Status: She is alert and oriented to person, place, and time.   Psychiatric:         Mood and Affect: Mood normal.         Behavior: Behavior normal.         Assessment:       Problem List Items Addressed This Visit        Immunology/Multi System    Rheumatoid arthritis involving multiple sites with positive rheumatoid factor       Endocrine    Type 2 diabetes mellitus, without long-term current use of insulin      Other Visit Diagnoses     Right otitis media, unspecified otitis media type    -  Primary    Relevant Medications    amoxicillin-clavulanate 875-125mg (AUGMENTIN) 875-125 mg per tablet    cefTRIAXone injection 1 g    ketorolac injection 30 mg          Plan:        Right otitis media, unspecified otitis media type  -     amoxicillin-clavulanate 875-125mg (AUGMENTIN) 875-125 mg per tablet; Take 1 tablet by mouth every 12 (twelve) hours. for 7 days  Dispense: 14 tablet; Refill: 0  -     cefTRIAXone injection 1 g  -     ketorolac injection 30 mg    Type 2 diabetes mellitus with other specified complication, without long-term current use of insulin  Comments:  continue current regimen, recheck a1c 2 mo    Rheumatoid arthritis involving multiple sites with positive rheumatoid factor  Comments:  has upcoming visit with dr yo

## 2022-08-08 ENCOUNTER — TELEPHONE (OUTPATIENT)
Dept: FAMILY MEDICINE | Facility: CLINIC | Age: 42
End: 2022-08-08
Payer: COMMERCIAL

## 2022-08-08 NOTE — TELEPHONE ENCOUNTER
----- Message from Marina Yu sent at 8/8/2022  8:04 AM CDT -----  Contact: Patient  Type:  Same Day Appointment Request    Caller is requesting a same day appointment.  Caller declined first available appointment listed below.    Name of Caller:Rosa Silvio  When is the first available appointment?08/25/2022  Symptoms:ear infection  Best Call Back Number:[phone]  Additional Information:

## 2022-08-09 ENCOUNTER — OFFICE VISIT (OUTPATIENT)
Dept: FAMILY MEDICINE | Facility: CLINIC | Age: 42
End: 2022-08-09
Payer: COMMERCIAL

## 2022-08-09 VITALS
OXYGEN SATURATION: 100 % | HEIGHT: 59 IN | BODY MASS INDEX: 33.26 KG/M2 | DIASTOLIC BLOOD PRESSURE: 75 MMHG | WEIGHT: 165 LBS | RESPIRATION RATE: 15 BRPM | SYSTOLIC BLOOD PRESSURE: 117 MMHG | HEART RATE: 80 BPM

## 2022-08-09 DIAGNOSIS — N76.0 ACUTE VAGINITIS: ICD-10-CM

## 2022-08-09 DIAGNOSIS — B02.9 HERPES ZOSTER WITHOUT COMPLICATION: Primary | ICD-10-CM

## 2022-08-09 PROCEDURE — 3044F PR MOST RECENT HEMOGLOBIN A1C LEVEL <7.0%: ICD-10-PCS | Mod: CPTII,S$GLB,, | Performed by: PHYSICIAN ASSISTANT

## 2022-08-09 PROCEDURE — 3078F PR MOST RECENT DIASTOLIC BLOOD PRESSURE < 80 MM HG: ICD-10-PCS | Mod: CPTII,S$GLB,, | Performed by: PHYSICIAN ASSISTANT

## 2022-08-09 PROCEDURE — 1159F PR MEDICATION LIST DOCUMENTED IN MEDICAL RECORD: ICD-10-PCS | Mod: CPTII,S$GLB,, | Performed by: PHYSICIAN ASSISTANT

## 2022-08-09 PROCEDURE — 3074F SYST BP LT 130 MM HG: CPT | Mod: CPTII,S$GLB,, | Performed by: PHYSICIAN ASSISTANT

## 2022-08-09 PROCEDURE — 3074F PR MOST RECENT SYSTOLIC BLOOD PRESSURE < 130 MM HG: ICD-10-PCS | Mod: CPTII,S$GLB,, | Performed by: PHYSICIAN ASSISTANT

## 2022-08-09 PROCEDURE — 3008F BODY MASS INDEX DOCD: CPT | Mod: CPTII,S$GLB,, | Performed by: PHYSICIAN ASSISTANT

## 2022-08-09 PROCEDURE — 99213 OFFICE O/P EST LOW 20 MIN: CPT | Mod: S$GLB,,, | Performed by: PHYSICIAN ASSISTANT

## 2022-08-09 PROCEDURE — 3008F PR BODY MASS INDEX (BMI) DOCUMENTED: ICD-10-PCS | Mod: CPTII,S$GLB,, | Performed by: PHYSICIAN ASSISTANT

## 2022-08-09 PROCEDURE — 3044F HG A1C LEVEL LT 7.0%: CPT | Mod: CPTII,S$GLB,, | Performed by: PHYSICIAN ASSISTANT

## 2022-08-09 PROCEDURE — 99213 PR OFFICE/OUTPT VISIT, EST, LEVL III, 20-29 MIN: ICD-10-PCS | Mod: S$GLB,,, | Performed by: PHYSICIAN ASSISTANT

## 2022-08-09 PROCEDURE — 1159F MED LIST DOCD IN RCRD: CPT | Mod: CPTII,S$GLB,, | Performed by: PHYSICIAN ASSISTANT

## 2022-08-09 PROCEDURE — 3078F DIAST BP <80 MM HG: CPT | Mod: CPTII,S$GLB,, | Performed by: PHYSICIAN ASSISTANT

## 2022-08-09 RX ORDER — FLUCONAZOLE 150 MG/1
150 TABLET ORAL DAILY
Qty: 2 TABLET | Refills: 2 | Status: SHIPPED | OUTPATIENT
Start: 2022-08-09 | End: 2022-08-10

## 2022-08-09 RX ORDER — VALACYCLOVIR HYDROCHLORIDE 1 G/1
1000 TABLET, FILM COATED ORAL 3 TIMES DAILY
Qty: 21 TABLET | Refills: 0 | Status: SHIPPED | OUTPATIENT
Start: 2022-08-09 | End: 2022-09-13 | Stop reason: SDUPTHER

## 2022-08-09 NOTE — PROGRESS NOTES
"Subjective:      Patient ID: Rosa Anguiano is a 41 y.o. female.    Chief Complaint: Follow-up, Otalgia (Right ear pain), Sore Throat, and Vaginitis      HPI  Pt is here today with a cc of Right ear pain, skin sensitivity, and vaginal burning.    Vaginitis - reports vaginal itching, burning, and white dc since taking the antibiotic last weeek.    Right ear pain and sore throat x  1 week - took augementin.  Reports that the right side of her neck and face feel "burning and tingling" states she has a h/o recurrent shingles with similar s/s.  No rash at this time.     Review of Systems   Constitutional: Negative for activity change, appetite change, chills, diaphoresis, fatigue and fever.   HENT: Positive for ear pain and sore throat (reports pain on the right side of neck,  NT and burning). Negative for nasal congestion, postnasal drip, rhinorrhea, trouble swallowing and voice change.    Eyes: Negative for pain and redness.   Respiratory: Negative for cough, choking, chest tightness, shortness of breath and wheezing.    Cardiovascular: Negative for chest pain and leg swelling.   Gastrointestinal: Negative for abdominal distention, abdominal pain, blood in stool, constipation, diarrhea, nausea, vomiting and reflux.   Genitourinary: Positive for vaginal discharge (vaginal DC and itching ). Negative for bladder incontinence, decreased urine volume, difficulty urinating, dysuria, enuresis, flank pain, frequency, hematuria, nocturia, pelvic pain, urgency, vaginal bleeding, vaginal pain and vaginal dryness.   Musculoskeletal: Negative for arthralgias, back pain, leg pain, myalgias, neck pain and neck stiffness.   Integumentary:  Negative for rash.   Neurological: Positive for numbness. Negative for dizziness, weakness and headaches.   Hematological: Negative for adenopathy.       Medication List with Changes/Refills   New Medications    FLUCONAZOLE (DIFLUCAN) 150 MG TAB    Take 1 tablet (150 mg total) by mouth once daily. Take " "one day, repeat in 3 days if not resolved for 1 day    VALACYCLOVIR (VALTREX) 1000 MG TABLET    Take 1 tablet (1,000 mg total) by mouth 3 (three) times daily. for 7 days   Current Medications    AMOXICILLIN-CLAVULANATE 875-125MG (AUGMENTIN) 875-125 MG PER TABLET    Take 1 tablet by mouth every 12 (twelve) hours. for 7 days    BORIC ACID (PH-D) 600 MG VAGINAL SUPPOSITORY    Place 1 suppository (600 mg total) vaginally every evening. As directed    DICLOFENAC (VOLTAREN) 50 MG EC TABLET    1 tab 2-3 times a day with a meal    FAMOTIDINE (PEPCID) 20 MG TABLET    TAKE 1 TABLET BY MOUTH TWICE DAILY AS NEEDED FOR STOMACH ACID OR REFLUX OR HEARTBURN    FLUCONAZOLE (DIFLUCAN) 150 MG TAB    TAKE 1 TABLET BY MOUTH AS A SINGLE DOSE    FLUTICASONE PROPIONATE (FLONASE) 50 MCG/ACTUATION NASAL SPRAY    2 sprays by Each Nostril route 2 (two) times daily.    HYDROXYZINE PAMOATE (VISTARIL) 25 MG CAP        HYOSCYAMINE (ANASPAZ,LEVSIN) 0.125 MG TAB    Take 1 tablet (125 mcg total) by mouth every 4 (four) hours as needed (stomach pain).    METHEN-M.BLUE-S.PHOS-PHSAL-HYO (URIBEL) 118-10-40.8-36 MG CAP    TAKE 1 CAPSULE BY MOUTH THREE TIMES DAILY AS NEEDED FOR DYSURIA/BLADDER SPASMS    OXYBUTYNIN (DITROPAN XL) 15 MG TR24    Take 1 tablet (15 mg total) by mouth once daily.    OZEMPIC 2 MG/DOSE (8 MG/3 ML) PNIJ        PANTOPRAZOLE (PROTONIX) 40 MG TABLET        PREGABALIN (LYRICA) 150 MG CAPSULE    Take 150 mg by mouth 2 (two) times daily.    PROAIR HFA 90 MCG/ACTUATION INHALER    INL 2 PFS PO Q 4 H        Objective:     Vitals:    08/09/22 0816   BP: 117/75   Pulse: 80   Resp: 15   SpO2: 100%   Weight: 74.8 kg (165 lb)   Height: 4' 11" (1.499 m)        Physical Exam  Constitutional:       General: She is not in acute distress.     Appearance: She is not ill-appearing, toxic-appearing or diaphoretic.   HENT:      Head: Normocephalic.      Right Ear: Tympanic membrane, ear canal and external ear normal. No mastoid tenderness.      Left Ear: " Tympanic membrane, ear canal and external ear normal. No mastoid tenderness.      Nose: Nose normal.      Mouth/Throat:      Mouth: Mucous membranes are moist.      Pharynx: Oropharynx is clear. No oropharyngeal exudate.   Eyes:      General: No scleral icterus.        Right eye: No discharge.         Left eye: No discharge.      Conjunctiva/sclera: Conjunctivae normal.   Cardiovascular:      Rate and Rhythm: Normal rate and regular rhythm.      Pulses: Normal pulses.      Heart sounds: Normal heart sounds. No murmur heard.    No friction rub. No gallop.   Pulmonary:      Effort: Pulmonary effort is normal. No respiratory distress.      Breath sounds: No wheezing, rhonchi or rales.   Musculoskeletal:         General: No swelling.      Cervical back: Normal range of motion. No rigidity or tenderness.      Right lower leg: No edema.      Left lower leg: No edema.      Comments: Moves all extremities well and with good control    Lymphadenopathy:      Cervical: No cervical adenopathy.   Skin:     General: Skin is warm and dry.   Neurological:      Mental Status: She is alert and oriented to person, place, and time.   Psychiatric:         Mood and Affect: Mood normal.         Behavior: Behavior normal.            Assessment & Plan:     Herpes zoster without complication  Comments:  treating empirically based on prior hx   Orders:  -     valACYclovir (VALTREX) 1000 MG tablet; Take 1 tablet (1,000 mg total) by mouth 3 (three) times daily. for 7 days  Dispense: 21 tablet; Refill: 0    Acute vaginitis  -     fluconazole (DIFLUCAN) 150 MG Tab; Take 1 tablet (150 mg total) by mouth once daily. Take one day, repeat in 3 days if not resolved for 1 day  Dispense: 2 tablet; Refill: 2           No follow-ups on file.       -Patient instructed that our office calls back on all labs and imaging within 1 week of receiving the results. Patient instructed to reach out to our office if they have not heard from us so that we can request  the results from the lab/imaging center           Tameka Dyson PA-C

## 2022-08-10 ENCOUNTER — PATIENT MESSAGE (OUTPATIENT)
Dept: FAMILY MEDICINE | Facility: CLINIC | Age: 42
End: 2022-08-10
Payer: COMMERCIAL

## 2022-08-10 ENCOUNTER — OFFICE VISIT (OUTPATIENT)
Dept: RHEUMATOLOGY | Facility: CLINIC | Age: 42
End: 2022-08-10
Payer: COMMERCIAL

## 2022-08-10 VITALS
SYSTOLIC BLOOD PRESSURE: 112 MMHG | OXYGEN SATURATION: 99 % | HEIGHT: 59 IN | WEIGHT: 166.19 LBS | DIASTOLIC BLOOD PRESSURE: 76 MMHG | BODY MASS INDEX: 33.5 KG/M2 | HEART RATE: 77 BPM | RESPIRATION RATE: 16 BRPM

## 2022-08-10 DIAGNOSIS — Z11.59 ENCOUNTER FOR HEPATITIS C SCREENING TEST FOR LOW RISK PATIENT: ICD-10-CM

## 2022-08-10 DIAGNOSIS — M25.50 POLYARTHRALGIA: ICD-10-CM

## 2022-08-10 DIAGNOSIS — M25.59 PAIN IN OTHER JOINT: Primary | ICD-10-CM

## 2022-08-10 DIAGNOSIS — Z11.1 SCREENING-PULMONARY TB: ICD-10-CM

## 2022-08-10 DIAGNOSIS — M79.10 MYALGIA: ICD-10-CM

## 2022-08-10 DIAGNOSIS — M79.2 NEURALGIA: ICD-10-CM

## 2022-08-10 DIAGNOSIS — Z13.89 SCREENING FOR RHEUMATIC DISORDER: ICD-10-CM

## 2022-08-10 PROCEDURE — 1159F MED LIST DOCD IN RCRD: CPT | Mod: CPTII,S$GLB,, | Performed by: INTERNAL MEDICINE

## 2022-08-10 PROCEDURE — 3074F SYST BP LT 130 MM HG: CPT | Mod: CPTII,S$GLB,, | Performed by: INTERNAL MEDICINE

## 2022-08-10 PROCEDURE — 1160F PR REVIEW ALL MEDS BY PRESCRIBER/CLIN PHARMACIST DOCUMENTED: ICD-10-PCS | Mod: CPTII,S$GLB,, | Performed by: INTERNAL MEDICINE

## 2022-08-10 PROCEDURE — 3074F PR MOST RECENT SYSTOLIC BLOOD PRESSURE < 130 MM HG: ICD-10-PCS | Mod: CPTII,S$GLB,, | Performed by: INTERNAL MEDICINE

## 2022-08-10 PROCEDURE — 3044F PR MOST RECENT HEMOGLOBIN A1C LEVEL <7.0%: ICD-10-PCS | Mod: CPTII,S$GLB,, | Performed by: INTERNAL MEDICINE

## 2022-08-10 PROCEDURE — 3044F HG A1C LEVEL LT 7.0%: CPT | Mod: CPTII,S$GLB,, | Performed by: INTERNAL MEDICINE

## 2022-08-10 PROCEDURE — 1159F PR MEDICATION LIST DOCUMENTED IN MEDICAL RECORD: ICD-10-PCS | Mod: CPTII,S$GLB,, | Performed by: INTERNAL MEDICINE

## 2022-08-10 PROCEDURE — 3008F PR BODY MASS INDEX (BMI) DOCUMENTED: ICD-10-PCS | Mod: CPTII,S$GLB,, | Performed by: INTERNAL MEDICINE

## 2022-08-10 PROCEDURE — 1160F RVW MEDS BY RX/DR IN RCRD: CPT | Mod: CPTII,S$GLB,, | Performed by: INTERNAL MEDICINE

## 2022-08-10 PROCEDURE — 3008F BODY MASS INDEX DOCD: CPT | Mod: CPTII,S$GLB,, | Performed by: INTERNAL MEDICINE

## 2022-08-10 PROCEDURE — 99204 OFFICE O/P NEW MOD 45 MIN: CPT | Mod: S$GLB,,, | Performed by: INTERNAL MEDICINE

## 2022-08-10 PROCEDURE — 3078F PR MOST RECENT DIASTOLIC BLOOD PRESSURE < 80 MM HG: ICD-10-PCS | Mod: CPTII,S$GLB,, | Performed by: INTERNAL MEDICINE

## 2022-08-10 PROCEDURE — 99204 PR OFFICE/OUTPT VISIT, NEW, LEVL IV, 45-59 MIN: ICD-10-PCS | Mod: S$GLB,,, | Performed by: INTERNAL MEDICINE

## 2022-08-10 PROCEDURE — 3078F DIAST BP <80 MM HG: CPT | Mod: CPTII,S$GLB,, | Performed by: INTERNAL MEDICINE

## 2022-08-10 RX ORDER — PREGABALIN 150 MG/1
150 CAPSULE ORAL 2 TIMES DAILY
Qty: 60 CAPSULE | Refills: 1 | Status: SHIPPED | OUTPATIENT
Start: 2022-08-10 | End: 2022-09-14 | Stop reason: SDUPTHER

## 2022-08-10 RX ORDER — DICLOFENAC SODIUM 50 MG/1
50 TABLET, DELAYED RELEASE ORAL 2 TIMES DAILY PRN
Qty: 60 TABLET | Refills: 1 | Status: SHIPPED | OUTPATIENT
Start: 2022-08-10 | End: 2022-09-14 | Stop reason: SDUPTHER

## 2022-08-12 ENCOUNTER — TELEPHONE (OUTPATIENT)
Dept: UROLOGY | Facility: CLINIC | Age: 42
End: 2022-08-12
Payer: COMMERCIAL

## 2022-08-12 NOTE — TELEPHONE ENCOUNTER
Left message no stone on imaging report and can view imaging report via portal. [facility] LPN    ----- Message from Chucky Huerta NP sent at 8/12/2022  9:38 AM CDT -----  No stones noted.

## 2022-08-26 ENCOUNTER — PATIENT MESSAGE (OUTPATIENT)
Dept: FAMILY MEDICINE | Facility: CLINIC | Age: 42
End: 2022-08-26
Payer: COMMERCIAL

## 2022-08-31 NOTE — PROGRESS NOTES
Subjective:      Patient ID: Rosa Anguiano is a 41 y.o. female.    Chief Complaint: Disease Management    HPI:   Includes joint pain with subjective swelling.   Antecedent event includes: None;  Pain location includes: elbow;  Gradual onset; beginning >years ago;  Constant ache, Moderate in severity,   Lasting >minutes, Worse during the daytime;   Improved with none;   Worsened with activity, overuse, stress, and rest;         Review of Systems   Constitutional:  Positive for fatigue. Negative for chills and fever.   HENT:  Positive for sore throat. Negative for nasal congestion, ear pain, hearing loss, mouth dryness, mouth sores, nosebleeds and trouble swallowing.    Eyes:  Positive for itching and eye dryness. Negative for photophobia, pain, redness and visual disturbance.   Respiratory:  Negative for cough and shortness of breath.    Cardiovascular:  Negative for chest pain, palpitations and leg swelling.   Gastrointestinal:  Positive for reflux. Negative for abdominal distention, abdominal pain, blood in stool, constipation, diarrhea, rectal pain, vomiting and fecal incontinence.   Endocrine: Negative for polydipsia and polyuria.   Genitourinary:  Positive for hematuria. Negative for bladder incontinence, dysuria, enuresis and genital sores.   Musculoskeletal:  Positive for arthralgias, joint swelling and myalgias.   Integumentary:  Negative for rash, wound and mole/lesion.   Neurological:  Negative for weakness, numbness and headaches.   Hematological:  Positive for adenopathy. Does not bruise/bleed easily.   Psychiatric/Behavioral:  Positive for sleep disturbance. Negative for dysphoric mood. The patient is nervous/anxious.     Past Medical History:   Diagnosis Date    Acute pelvic pain, female     Breast pain     Cystitis     Diabetes mellitus     Dyspareunia, female     Endometriosis     Fibromyalgia     GERD (gastroesophageal reflux disease)     IBS (irritable bowel syndrome)     Interstitial cystitis      Irregular menstrual cycle     Leukorrhea     Osteoarthritis     Ovarian cyst     Pneumonia     YAMILET (stress urinary incontinence, female)     Vaginal yeast infection     Vulvitis      Past Surgical History:   Procedure Laterality Date     SECTION      CHOLECYSTECTOMY      LAPAROSCOPY-ASSISTED VAGINAL HYSTERECTOMY      TUBAL LIGATION        See the Assessment/Plan for further characterization of the HPI, ROS, Medical, Surgical, Family, and Social Histories including Tobacco use, Meds; all of which has been reviewed in Epic.    Medication List with Changes/Refills   New Medications    HYDROXYCHLOROQUINE (PLAQUENIL) 200 MG TABLET    Take 1 tablet (200 mg total) by mouth once daily.   Current Medications    BORIC ACID (PH-D) 600 MG VAGINAL SUPPOSITORY    Place 1 suppository (600 mg total) vaginally every evening. As directed    DULOXETINE (CYMBALTA) 20 MG CAPSULE    Take 1 capsule (20 mg total) by mouth once daily.    FAMOTIDINE (PEPCID) 20 MG TABLET    TAKE 1 TABLET BY MOUTH TWICE DAILY AS NEEDED FOR STOMACH ACID OR REFLUX OR HEARTBURN    FLUCONAZOLE (DIFLUCAN) 150 MG TAB    Take 1 tablet (150 mg total) by mouth once a week.    FLUTICASONE PROPIONATE (FLONASE) 50 MCG/ACTUATION NASAL SPRAY    2 sprays by Each Nostril route 2 (two) times daily.    HYDROXYZINE PAMOATE (VISTARIL) 25 MG CAP        HYOSCYAMINE (ANASPAZ,LEVSIN) 0.125 MG TAB    Take 1 tablet (125 mcg total) by mouth every 4 (four) hours as needed (stomach pain).    METHEN-M.BLUE-S.PHOS-PHSAL-HYO (URIBEL) 118-10-40.8-36 MG CAP    TAKE 1 CAPSULE BY MOUTH THREE TIMES DAILY AS NEEDED FOR DYSURIA/BLADDER SPASMS    MISCELLANEOUS MEDICAL SUPPLY KIT    Auralgan drops for ear pain    OXYBUTYNIN (DITROPAN XL) 15 MG TR24    Take 1 tablet (15 mg total) by mouth once daily.    OZEMPIC 2 MG/DOSE (8 MG/3 ML) PNIJ        PANTOPRAZOLE (PROTONIX) 40 MG TABLET        PROAIR HFA 90 MCG/ACTUATION INHALER    INL 2 PFS PO Q 4 H    VALACYCLOVIR (VALTREX) 1000 MG TABLET    Take  "1 tablet (1,000 mg total) by mouth every 12 (twelve) hours. for 10 days   Changed and/or Refilled Medications    Modified Medication Previous Medication    DICLOFENAC (VOLTAREN) 50 MG EC TABLET diclofenac (VOLTAREN) 50 MG EC tablet       Take 1 tablet (50 mg total) by mouth 2 (two) times daily as needed (pain).    Take 1 tablet (50 mg total) by mouth 2 (two) times daily as needed (pain).    PREGABALIN (LYRICA) 150 MG CAPSULE pregabalin (LYRICA) 150 MG capsule       Take 1 capsule (150 mg total) by mouth 2 (two) times daily.    Take 1 capsule (150 mg total) by mouth 2 (two) times daily.         Objective:   /82   Pulse 82   Resp 16   Ht 4' 11" (1.499 m)   Wt 76 kg (167 lb 9.6 oz)   SpO2 99%   BMI 33.85 kg/m²   Physical Exam  Non-toxic appearance. No distress.   Normocephalic and atraumatic.   External ears normal.    Conjunctivae and EOM are normal. No scleral icterus.   RRR, No friction rub; palpable distal pulses.    No tachypnea or signs of respiratory distress. CTAB.  Abdominal: No guarding or rebound tenderness.   Neurological: Oriented. Normal thought content. No cranial nerve deficit. Strength is grossly normal without focal deficit.  Skin is warm. No pallor.   Musculoskeletal: see below for further input. No overt synovitis.     CT Abdomen Pelvis  Without Contrast                                RADIOLOGY REPORT        PT NAME: BECKI ESCOBAR      [facility]     : 1980 F 41             [address]    ACCT: [id number]                                              [facility] Rec #: [id number]                                        [id number]    Patient Location: [facility].RAD/             Procedure: CT ABD   PELVIS WO CONTRAST    REQUISITION #: [id number]      REPORT #: [id number]           DATE OF EXAM: 22    TIME OF EXAM:            CMS MANDATED QUALITY DATA - CT RADIATION - 436        All CT scans at this facility use dose modulation, iterative-reconstruction, "   and/or weight-based dosing when appropriate to reduce radiation dose to as   low as reasonably achievable.            HISTORY:Right flank pain and renal stone is the history provided.        TECHNIQUE: Serial axial images were obtained from the domes of the diaphragm   to the pubic symphysis without intravenous or oral contrast. Coronal and   sagittal reconstructions were performed. Radiation dose exposure not   provided by the CT technologist.        COMPARISON: Comparison to previous study dated July 31, 2015.        FINDINGS:    Lung bases: Lung bases are free of infiltrate or pleural effusion.        Liver: Noncontrasted liver appears normal in size and contour. Evidence of   previous cholecystectomy is seen.        Pancreas: Noncontrasted pancreas is unremarkable.        Spleen: Normal.        Adrenal glands: Unremarkable.        Kidneys and ureters: Noncontrasted kidneys appear normal in size and   contour. No renal calculi or hydronephrosis is seen. Ureters appear   nondilated bilaterally.        Bladder: Bladder is unremarkable. No bladder calculi are seen.        Bowel: Stomach and small bowel appear nondistended. Moderate volume of   colonic fecal material is present. No imaging evidence of appendiceal   inflammation is seen. No bowel obstruction is noted.        Peritoneum: No evidence of ascites or free air is seen.        Retroperitoneum: Unremarkable.        Lymph nodes: No evidence of abdominal or pelvic adenopathy is seen.        Abdominal wall: Tiny fat-containing umbilical hernia is present.        Bones: No acute bony abnormality is seen.        IMPRESSION:    1. No acute intra-abdominal abnormality identified on this noncontrasted   exam.    2. No renal calculi or hydronephrosis seen.                        DICTATING PHYSICIAN:  DORI DAVISON JR., MD                   Date Dictated: 08/12/22 0841        Signed By:  DORI DAVISON JR., MD <Electronically signed by DORI DAVISON JR., MD in  OV>    Date Signed:  08/12/22 0902     CC: HIREN LEIJA ; RAAD RUSHING      ADMITTING PHYSICIAN:                                                                                                    ORDERING PHY: HIREN LEIJA                                                                                                                                                      ATTENDING PHY: HIREN LEIJA    Patient Status:  REG CLI    Admit Service Date: 08/12/22         Office Visit on 07/26/2022   Component Date Value Ref Range Status    B12 07/27/2022 1,122  232 - 1,245 pg/mL Final    Comment: NOTE  Testing performed at:  [facility], [address] [place] [address] CLIA #:[id number]      Methylmalonic Acid 07/27/2022 108  87 - 318 nmol/L Final    Comment:   This test was developed and its analytical performance  characteristics have been determined by [facility]. It has not been  cleared or approved by FDA. This assay has been validated  pursuant to the CLIA regulations and is used for clinical  purposes.  NOTE  Testing performed at:  [facility], [address] [place] [id number] CLIA#:[id number]      WBC 07/27/2022 8.54  4.3 - 10.8 X 10 3/ul Final    RBC 07/27/2022 4.36  4.2 - 5.4 X 10 6/ul Final    RDW-SD 07/27/2022 42.4  37 - 54 fl Final    Hemoglobin 07/27/2022 13.3  12 - 16 g/dL Final    Hematocrit 07/27/2022 39.2  37 - 47 % Final    MCV 07/27/2022 89.9  82 - 100 fl Final    MCH 07/27/2022 30.5  27 - 32 pg Final    MCHC 07/27/2022 33.9  32 - 36 g/dL Final    Platelets 07/27/2022 292  135 - 400 X 10 3/ul Final    Neutrophils 07/27/2022 53.1  34 - 71.1 % Final    Lymphocytes 07/27/2022 38.6  19.3 - 53.1 % Final    Monocytes 07/27/2022 7.0  4.7 - 12.5 % Final    Eosinophils 07/27/2022 0.5 (L)  0.7 - 7.0 % Final    Basophils 07/27/2022 0.4  0.2 - 1.2 % Final    Neutrophils Absolute  07/27/2022 4.54  2.15 - 7.56 X 10 3/ul Final    Lymphocytes Absolute 07/27/2022 3.30  0.86 - 4.75 X 10 3/ul Final    Monocytes Absolute 07/27/2022 0.60  0.22 - 1.08 X 10 3/ul Final    Eosinophils Absolute 07/27/2022 0.04  0.04 - 0.54 X 10 3/ul Final    Basophils Absolute 07/27/2022 0.03  0.00 - 0.22 X 10 3/ul Final    Immature Granulocytes Absolute 07/27/2022 0.03  0 - 0.04 X 10 3/ul Final    Immature Granulocytes 07/27/2022 0.4  0 - 0.5 % Final    IG includes metamyelocytes, myelocytes, and promyelocytes    nRBC# 07/27/2022 0.0  0 - 0.2 /100 WBC Final    nRBC Count Absolute 07/27/2022 0.000  0 - 0.012 x 10 3/ul Final    Comment: NOTE  Testing performed at:  [facility], [address] [place] [address] CLIA #:[id number]      Additional Testing 07/27/2022 SEE BELOW   Final    Comment: Additional testing was collected and sent to a reference lab. Results  may take 7 days to 2 weeks before they are final. Reports will be sent  to ordering client via fax, pathviewer, mail, interface or   delivered.  NOTE  Testing performed at:  [facility], [address] [place] [address]  [id number] CLIA #:[id number]     Patient Outreach on 07/18/2022   Component Date Value Ref Range Status    Cholesterol 07/14/2022 216 (H)  100 - 200 mg/dL Final    Triglycerides 07/14/2022 141  0 - 150 mg/dL Final    HDL 07/14/2022 50 (L)  >60 Final    LDL Cholesterol 07/14/2022 138 (H)  0 - 100 mg/dL Final    LDL/HDL Ratio 07/14/2022 2.8  1.0 - 3.0 Final    Hemoglobin A1C 06/01/2022 6.6 (H)  4.0 - 6.0 % Final    Glucose 06/01/2022 103  74 - 106 mg/dL Final    BUN 06/01/2022 9.9  6 - 20 mg/dL Final    Creatinine 06/01/2022 0.5  0.5 - 0.9 mg/dL Final    AST 06/01/2022 16  0 - 32 U/L Final    ALT 06/01/2022 28  0 - 33 U/L Final    Alkaline Phosphatase 06/01/2022 91  35 - 105 U/L Final    Calcium 06/01/2022 9.1  8.6 - 10.2 mg/dL Final    Total Protein 06/01/2022 7.6  6.4 - 8.3 g/dL Final    Albumin 06/01/2022 4.4  3.5 -  5.2 g/dL Final    Total Bilirubin 06/01/2022 0.4  0.0 - 1.2 mg/dL Final    Sodium 06/01/2022 138  136 - 145 mmol/L Final    Potassium 06/01/2022 3.8  3.5 - 5.1 mmol/L Final    Chloride 06/01/2022 104  98 - 107 mmol/L Final    CO2 06/01/2022 25  22 - 29 mmol/L Final    Globulin 06/01/2022 3.2  1.5 - 4.5 Final    Albumin/Globulin Ratio 06/01/2022 1.4  1.0 - 2.7 Final    BUN/CREAT RATIO 06/01/2022 18.7  6 - 20 Final    GFR ESTIMATION 06/01/2022 126.51  >60.00 Final    Anion Gap 06/01/2022 9  8 - 17 mmol/L Final   Office Visit on 07/18/2022   Component Date Value Ref Range Status    Color, UA 07/18/2022 Green/Blue   Final    pH, UA 07/18/2022 5.5   Final    WBC, UA 07/18/2022 Negative   Final    Nitrite, UA 07/18/2022 Negative   Final    Protein, POC 07/18/2022 Negative   Final    Glucose, UA 07/18/2022 Negative   Final    Ketones, UA 07/18/2022 Negative   Final    Urobilinogen, UA 07/18/2022 0.2   Final    Bilirubin, POC 07/18/2022 Negative   Final    Blood, UA 07/18/2022 Trace-Intact   Final    Clarity, UA 07/18/2022 Clear   Final    Spec Grav UA 07/18/2022 1.030   Final    Urine Culture, Routine 07/18/2022    Final    Comment: Source: URINE  Site:  Organism #1                    MIXED ORGANISMS, NO SUSCEPTIBILITY  Quantity                      25,000    Mixed ilene in a urine culture means there are several different types  of bacteria present, which is usually indicative of contamination of  the urine.  NOTE  Testing performed at:  [facility], [address] [place] [address]  [address] CLIA #:[id number]     Orders Only on 06/27/2022   Component Date Value Ref Range Status    POC Glucose 06/27/2022 113 (H)  70 - 105 Final   Orders Only on 06/24/2022   Component Date Value Ref Range Status    Specimen Collection Method 06/24/2022 Cl Catch [place]   Final    Color, UA 06/24/2022 Christine (A)  Yellow Final    Appearance, UA 06/24/2022 Clear  Clear Final    pH, UA 06/24/2022 5.0  5.0 - 9.0 pH Final    Specific  Gravity, UA 06/24/2022 1.020  1.000 - 1.030 Final    Protein, UA 06/24/2022 Negative  Negative mg/dL Final    Glucose, UA 06/24/2022 Normal  Normal mg/dL Final    Ketones, UA 06/24/2022 Negative  Negative mg/dL Final    Occult Blood UA 06/24/2022 10 (A)  Negative /uL Final    Nitrite, UA 06/24/2022 Negative  Negative Final    Bilirubin (UA) 06/24/2022 Negative  Negative mg/dL Final    Urobilinogen, UA 06/24/2022 Normal  Normal mg/dL Final    Leukocytes, UA 06/24/2022 Negative  Negative /uL Final    RBC, UA 06/24/2022 Rare  0 - 2 /HPF Final    WBC, UA 06/24/2022 Rare  0 - 5 /HPF Final    Squam Epithel, UA 06/24/2022 2+ Moderate  /LPF Final    Ca Oxalate Swetha, UA 06/24/2022 2+ Moderate (A)  None Seen /LPF Final    Bacteria 06/24/2022 1+ Few  Few/HPF /HPF Final    Service Comment 03 06/24/2022 No, Criteria Not Met   Final   Orders Only on 06/24/2022   Component Date Value Ref Range Status    Sodium 06/24/2022 139  135 - 145 mmol/L Final    Potassium 06/24/2022 3.9  3.6 - 5.2 mmol/L Final    Chloride 06/24/2022 105  100 - 108 mmol/L Final    CO2 06/24/2022 27  21 - 32 mmol/L Final    Anion Gap 06/24/2022 7.0  3.0 - 11.0 mmol/L Final    BUN 06/24/2022 10  7 - 18 mg/dL Final    Creatinine 06/24/2022 0.55  0.55 - 1.02 mg/dL Final    GFR ESTIMATION 06/24/2022 > 60  >60 Final    If patient is , multiply result by 1.212.           DESCRIPTION       GLOMERULAR FILTRATION RATE             NORMAL                     >60             KIDNEY  DISEASE           15-60             KIDNEY FAILURE             <15    BUN/Creatinine Ratio 06/24/2022 18.18 (H)  7 - 18 Ratio Final    Glucose 06/24/2022 158 (H)  70 - 110 mg/dL Final    Comment: Level 2 hypoglycemia (blood glucose 40-53) has beenassociated with impaired cognitive function, reducedhypoglycemia awareness following repeated episodes,progression to severe hypoclycemia, cardiac arrhythmias, andfuture mortality.Limiting the critical   value for blood glucose to <40  mg/dLmeans overlooking patients with level 2 hypoglycemia (bloodglucose 40-53 mg/dL) who may experience severe hypoglycemia.The new adult critical value for blood glucose approved [facility] is <54 mg/dL.      Calcium 06/24/2022 8.7 (L)  8.8 - 10.5 mg/dL Final   Orders Only on 06/24/2022   Component Date Value Ref Range Status    WBC 06/24/2022 7.8  4.6 - 10.2 10*3/uL Final    RBC 06/24/2022 4.23  4.2 - 5.4 10*6/uL Final    Hemoglobin 06/24/2022 12.7  12.0 - 16.0 g/dL Final    Hematocrit 06/24/2022 37.6  37.0 - 47.0 % Final    MCV 06/24/2022 88.9  80 - 97 fL Final    MCH 06/24/2022 30.0  27.0 - 31.2 pg Final    MCHC 06/24/2022 33.8  31.8 - 35.4 g/dL Final    RDW RBC Auto-Rto 06/24/2022 13.2  12.5 - 18.0 % Final    Platelets 06/24/2022 296  142 - 424 10*3/uL Final    Neutrophils 06/24/2022 60.0  37 - 80 % Final    Neutrophils Absolute 06/24/2022 4.8  1.8 - 7.7 10*3/uL Final    Bands 06/24/2022 2.0  0 - 5 % Final    Lymphocytes 06/24/2022 32.0  25 - 40 % Final    Monocytes 06/24/2022 6.0  1 - 15 % Final    Cells Counted 06/24/2022 100   Final    Platelet Estimate 06/24/2022 Adequate   Final    RBC Morphology 06/24/2022 N/N   Final   Office Visit on 04/26/2022   Component Date Value Ref Range Status    Trichomonas DNA 04/26/2022 NOT DETECTED  NOT DETECTED Final    Candida Group DNA 04/26/2022 NOT DETECTED  NOT DETECTED Final    Comment: Candida group includes albicans, tropicalis, parapsilosis and  dubliniensis.      Jamee glabrata DNA 04/26/2022 NOT DETECTED  NOT DETECTED Final    Jamee krusei DNA 04/26/2022 NOT DETECTED  NOT DETECTED Final    Vaginosis Panel DNA 04/26/2022 NOT DETECTED  NOT DETECTED Final    Comment: Vaginosis panel DNA Not Detected: Detection of marker combinations  related to normal vaginal ilene.  NOTE  Testing performed at:  [facility], [address] [place] [address]  [address] CLIA #:[id number]     Office Visit on 04/20/2022   Component Date Value Ref Range Status    POC Residual  Urine Volume 04/20/2022 0  0 - 100 mL Final   Office Visit on 09/23/2021   Component Date Value Ref Range Status    Color, UA 09/23/2021 Yellow   Final    WBC, UA 09/23/2021 Negative   Final    Nitrite, UA 09/23/2021 Negative   Final    Blood, UA 09/23/2021 Negative   Final    Clarity, UA 09/23/2021 Clear   Final   There may be more visits with results that are not included.        All of the data above and below has been reviewed by myself and any further interpretations will be reflected in the assessment and plan.   The data includes review of external notes, and independent interpretation of lab results, x-rays, and imaging reports.  Active inflammatory arthritis is an illness that poses a threat to bodily function and even a threat to life in some cases.  Drug therapy to treat inflammatory arthritis usually requires intensive monitoring for toxicity.    Review of patient's allergies indicates:  No Known Allergies    Assessment/Plan:       Prev noted/prior plan:  (No overt synovitis     Verbal education     Likely mechanical and myofascial + neurogenic pain;  No overt active inflammatory arthritis but she had been on treatment MTX per patient     Blood work to further evaluate     We will seek records from Dr. Wright and records from Orthopedics and [facility] at [facility]))    This visit:    Interim lab:    HepBcoreAb neg    Creat 0.79; alb 4.9    AST 49  ALT 90      WBC 9.9; Hgb 14.1; plt 340    UA SG 1.02 moderate epi cells    C3 220  C4 29    SPEP beta little high alpha2 little high    CPK 52        JACOB neg RF 17.9+ CCP neg HepBsAb+sAgneg coreAb neg HCV neg uric acid 5.4    2022 TB negative    ESR 24; CRP 15.2;       Interim records from Dr. Wright some of note 5/26/22: history fibromyalgia and DJD; Opiates help with qol; no active synovitis; 14/18 trigger points; A/P: fibromyalgia and DJD; continue lyrica 150mg; RF+ ACR90 with Methotrexate; Gout is a form of  arthritis; Lab; revisit 3 months (I do see Methotrexate 2.5mg on a med list but nothing mentioned in the note other than what I wrote and gout also mentioned with patient education? Also was given depomedrol at some point? Cymbalta and lyrica noted; I don't see what opioid she was prescribed?)      Interim records from Orthopedics and [facility] at [facility]: some of note 7/19/22  left elbow pain f/u; prior injection helped 2 months; will repeat left lateral epicondyle injection; revisit 6 months    8/3/22 MRI cervical: multilevel DJD DDD worse C5-6 C6-7      Prior plan/prev noted:   Lyrica 150mg bid   Prior voltaren 50mg we will resume   Prior skelaxin  Resume voltaren  Addendum 8/10/22 2:50pm: she called stating the lyrica no longer works for her so she is maybe going to stop it. Noted)        Went over lab including RF and LFTs    Went over records    She only used the MTX briefly with Dr. Wright less than a month and she tells me he stopped it    She did stop the lyrica completely interim and then resumed it recently again because it was helping with some pain in face her PCP told her was from shingles    She may be using the diclofenac?    She has some interim symptoms of inflammatory arthritis    No overt synovitis    Verbal education and some written    Add plaquenil 200mg daily with referral to Ophthalmology    liver US to further evaluate the LFTs    Cont/resume diclofenac 50mg bid prn    Cont lyrica 150mg bid    Cymbalta 20mg daily noted    Call if not improving or question    At the end of visit she asks about if she also stop Methotrexate? which I understood she was no longer using? and she clarified rather not using but not sure and telling me she needs more sleep noted    Revisit lab 2 weeks prior    Contact us prn       JACOB neg RF 17.9+ CCP neg HepBsAb+sAgneg coreAb neg HCV neg uric acid 5.4  There is some history to suggest inflammatory arthritis.     Rheumatoid arthritis and  fibromyalgia per records     previously followed by Dr. Wright     What has she been treated with?     We will seek records from Dr. Wright     She tells me Dr. Wright would not refill her diclofenac for unknown reason; no known kidney problem     She sounds like she was also started on MTX 4/week with folic acid that did not help so she stopped it     There is also some mechanical and neurogenic pain.     She has chronic numbness in head that she was told was from shingles on 4 different occasions and had treatment but never had a rash or skin lesion noted     Chronic neck and back pain     She tells me she is also working with Physical Medicine and Rehab [facility] if not already     Prior CHERI that did not help     Also had [place] [facility] [place] per patient     Prev noted/prior plan:  (No overt synovitis     Verbal education     Likely mechanical and myofascial + neurogenic pain;  No overt active inflammatory arthritis but she had been on treatment MTX per patient     Blood work to further evaluate     We will seek records from Dr. Wright and records from [facility]     Resume voltaren))        Has been managing with:     Lyrica 150mg bid     Prior voltaren 50mg we will resume     Prior skelaxin     Prior Methotrexate    Prior steroids oral and injections    Has been working with:     Records Tameka PHILIP some of note 7/27/22:  ((Follow-up (Patient here with c/o back pain, generalized body aches, and skin pain (burning sensation.//1. Back pain-awaiting authorization for MRI's/2. Generalized body aches-patient currently seeing Dr. Wright, but is just being given pain medications instead of a treatment plan per patient. She is seeing Cristobal in August to establish care./3. Skin pain-has been dx with shingles in the past with no lesions/rash. Verbalizes the pain is the same.), Back Pain, Generalized Body Aches, and burning sensation to skin     HPI   Patient is  here today for follow-up and new complaints.     Patient reports burning sensation and stinging sensation over her skin.  Reports she has a history of fibromyalgia and RA.  She was seen by Dr. Wright in the past but has an upcoming appointment with Dr. Jain in August.  Patient currently takes Lyrica.     Neck and back pain-patient reports chronic pain and muscle tension in her neck and back.  She is awaiting approval for 2 are eyes.  She currently takes Mobic daily without relief.     Pt has completed 8 weeks of physical therapy for her neck and back without relief.       Assessment & Plan:      Neuropathic pain  -     Vitamin B12; Future; Expected date: 07/26/2022  -     Methylmalonic Acid, Serum; Future; Expected date: 07/26/2022  -     CBC Auto Differential; Future; Expected date: 07/26/2022     Rheumatoid arthritis involving multiple sites with positive rheumatoid factor  Comments:  Keep follow-up with Rheumatology     Neck pain  -     metaxalone (SKELAXIN) 800 MG tablet; Take 1 tablet (800 mg total) by mouth 3 (three) times daily. for 10 days  Dispense: 30 tablet; Refill: 0  -     diclofenac (VOLTAREN) 50 MG EC tablet; 1 tab 2-3 times a day with a meal  Dispense: 90 tablet; Refill: 1     Back pain, unspecified back location, unspecified back pain laterality, unspecified chronicity  -     metaxalone (SKELAXIN) 800 MG tablet; Take 1 tablet (800 mg total) by mouth 3 (three) times daily. for 10 days  Dispense: 30 tablet; Refill: 0  -     diclofenac (VOLTAREN) 50 MG EC tablet; 1 tab 2-3 times a day with a meal  Dispense: 90 tablet; Refill: 1))    Review of medical records as stated above.  Lab +/- imaging and other ordered diagnostic studies to further evaluate.  See the orders associated with this note visit.  Medications as prescribed as tolerated.    Education including verbal and those noted in the patient instructions.  Revisit as scheduled and call prn.    The following diagnoses influence medical decision  making and/or need further workup including the orders listed below:    Rheumatoid arthritis involving multiple sites with positive rheumatoid factor  -     CBC Auto Differential; Future; Expected date: 10/26/2022  -     CK; Future; Expected date: 10/26/2022  -     Comprehensive Metabolic Panel; Future; Expected date: 10/26/2022  -     C-Reactive Protein; Future; Expected date: 10/26/2022  -     Sedimentation rate; Future; Expected date: 10/26/2022  -     Urinalysis; Future; Expected date: 10/26/2022  -     Rheumatoid Factor; Future; Expected date: 10/26/2022  -     diclofenac (VOLTAREN) 50 MG EC tablet; Take 1 tablet (50 mg total) by mouth 2 (two) times daily as needed (pain).  Dispense: 60 tablet; Refill: 1  -     hydrOXYchloroQUINE (PLAQUENIL) 200 mg tablet; Take 1 tablet (200 mg total) by mouth once daily.  Dispense: 30 tablet; Refill: 2  -     Ambulatory referral/consult to Ophthalmology; Future; Expected date: 10/26/2022    Pain in other joint  -     CBC Auto Differential; Future; Expected date: 10/26/2022  -     CK; Future; Expected date: 10/26/2022  -     Comprehensive Metabolic Panel; Future; Expected date: 10/26/2022  -     C-Reactive Protein; Future; Expected date: 10/26/2022  -     Sedimentation rate; Future; Expected date: 10/26/2022  -     Urinalysis; Future; Expected date: 10/26/2022  -     Rheumatoid Factor; Future; Expected date: 10/26/2022  -     diclofenac (VOLTAREN) 50 MG EC tablet; Take 1 tablet (50 mg total) by mouth 2 (two) times daily as needed (pain).  Dispense: 60 tablet; Refill: 1    Polyarthralgia  -     CBC Auto Differential; Future; Expected date: 10/26/2022  -     CK; Future; Expected date: 10/26/2022  -     Comprehensive Metabolic Panel; Future; Expected date: 10/26/2022  -     C-Reactive Protein; Future; Expected date: 10/26/2022  -     Sedimentation rate; Future; Expected date: 10/26/2022  -     Urinalysis; Future; Expected date: 10/26/2022  -     Rheumatoid Factor; Future; Expected  date: 10/26/2022  -     diclofenac (VOLTAREN) 50 MG EC tablet; Take 1 tablet (50 mg total) by mouth 2 (two) times daily as needed (pain).  Dispense: 60 tablet; Refill: 1    Cervical spondylosis  -     CBC Auto Differential; Future; Expected date: 10/26/2022  -     CK; Future; Expected date: 10/26/2022  -     Comprehensive Metabolic Panel; Future; Expected date: 10/26/2022  -     C-Reactive Protein; Future; Expected date: 10/26/2022  -     Sedimentation rate; Future; Expected date: 10/26/2022  -     Urinalysis; Future; Expected date: 10/26/2022  -     Rheumatoid Factor; Future; Expected date: 10/26/2022  -     diclofenac (VOLTAREN) 50 MG EC tablet; Take 1 tablet (50 mg total) by mouth 2 (two) times daily as needed (pain).  Dispense: 60 tablet; Refill: 1    Myalgia  -     CBC Auto Differential; Future; Expected date: 10/26/2022  -     CK; Future; Expected date: 10/26/2022  -     Comprehensive Metabolic Panel; Future; Expected date: 10/26/2022  -     C-Reactive Protein; Future; Expected date: 10/26/2022  -     Sedimentation rate; Future; Expected date: 10/26/2022  -     Urinalysis; Future; Expected date: 10/26/2022  -     Rheumatoid Factor; Future; Expected date: 10/26/2022  -     pregabalin (LYRICA) 150 MG capsule; Take 1 capsule (150 mg total) by mouth 2 (two) times daily.  Dispense: 60 capsule; Refill: 1  -     diclofenac (VOLTAREN) 50 MG EC tablet; Take 1 tablet (50 mg total) by mouth 2 (two) times daily as needed (pain).  Dispense: 60 tablet; Refill: 1    Neuralgia  -     CBC Auto Differential; Future; Expected date: 10/26/2022  -     CK; Future; Expected date: 10/26/2022  -     Comprehensive Metabolic Panel; Future; Expected date: 10/26/2022  -     C-Reactive Protein; Future; Expected date: 10/26/2022  -     Sedimentation rate; Future; Expected date: 10/26/2022  -     Urinalysis; Future; Expected date: 10/26/2022  -     Rheumatoid Factor; Future; Expected date: 10/26/2022  -     pregabalin (LYRICA) 150 MG  capsule; Take 1 capsule (150 mg total) by mouth 2 (two) times daily.  Dispense: 60 capsule; Refill: 1    Screening for rheumatic disorder  -     CBC Auto Differential; Future; Expected date: 10/26/2022  -     CK; Future; Expected date: 10/26/2022  -     Comprehensive Metabolic Panel; Future; Expected date: 10/26/2022  -     C-Reactive Protein; Future; Expected date: 10/26/2022  -     Sedimentation rate; Future; Expected date: 10/26/2022  -     Urinalysis; Future; Expected date: 10/26/2022  -     Rheumatoid Factor; Future; Expected date: 10/26/2022    Nonspecific abnormal finding  -     CBC Auto Differential; Future; Expected date: 10/26/2022  -     CK; Future; Expected date: 10/26/2022  -     Comprehensive Metabolic Panel; Future; Expected date: 10/26/2022  -     C-Reactive Protein; Future; Expected date: 10/26/2022  -     Sedimentation rate; Future; Expected date: 10/26/2022  -     Urinalysis; Future; Expected date: 10/26/2022  -     Rheumatoid Factor; Future; Expected date: 10/26/2022    Elevated liver transaminase level  -     CBC Auto Differential; Future; Expected date: 10/26/2022  -     CK; Future; Expected date: 10/26/2022  -     Comprehensive Metabolic Panel; Future; Expected date: 10/26/2022  -     C-Reactive Protein; Future; Expected date: 10/26/2022  -     Sedimentation rate; Future; Expected date: 10/26/2022  -     Urinalysis; Future; Expected date: 10/26/2022  -     Rheumatoid Factor; Future; Expected date: 10/26/2022  -     US Abdomen Limited; Future; Expected date: 10/26/2022    Medication monitoring encounter  -     CBC Auto Differential; Future; Expected date: 10/26/2022  -     CK; Future; Expected date: 10/26/2022  -     Comprehensive Metabolic Panel; Future; Expected date: 10/26/2022  -     C-Reactive Protein; Future; Expected date: 10/26/2022  -     Sedimentation rate; Future; Expected date: 10/26/2022  -     Urinalysis; Future; Expected date: 10/26/2022  -     Rheumatoid Factor; Future; Expected  date: 10/26/2022  -     Ambulatory referral/consult to Ophthalmology; Future; Expected date: 10/26/2022  -     US Abdomen Limited; Future; Expected date: 10/26/2022    Follow up in about 8 weeks (around 11/9/2022).     David Jain MD

## 2022-09-09 ENCOUNTER — TELEPHONE (OUTPATIENT)
Dept: FAMILY MEDICINE | Facility: CLINIC | Age: 42
End: 2022-09-09
Payer: COMMERCIAL

## 2022-09-13 ENCOUNTER — OFFICE VISIT (OUTPATIENT)
Dept: FAMILY MEDICINE | Facility: CLINIC | Age: 42
End: 2022-09-13
Payer: COMMERCIAL

## 2022-09-13 VITALS
OXYGEN SATURATION: 100 % | RESPIRATION RATE: 14 BRPM | SYSTOLIC BLOOD PRESSURE: 131 MMHG | BODY MASS INDEX: 33.47 KG/M2 | DIASTOLIC BLOOD PRESSURE: 65 MMHG | HEART RATE: 82 BPM | WEIGHT: 166 LBS | HEIGHT: 59 IN

## 2022-09-13 DIAGNOSIS — B37.31 CANDIDA VAGINITIS: ICD-10-CM

## 2022-09-13 DIAGNOSIS — B02.9 HERPES ZOSTER WITHOUT COMPLICATION: Primary | ICD-10-CM

## 2022-09-13 DIAGNOSIS — F41.1 GENERALIZED ANXIETY DISORDER: ICD-10-CM

## 2022-09-13 PROCEDURE — 1159F MED LIST DOCD IN RCRD: CPT | Mod: CPTII,S$GLB,, | Performed by: FAMILY MEDICINE

## 2022-09-13 PROCEDURE — 3075F PR MOST RECENT SYSTOLIC BLOOD PRESS GE 130-139MM HG: ICD-10-PCS | Mod: CPTII,S$GLB,, | Performed by: FAMILY MEDICINE

## 2022-09-13 PROCEDURE — 3075F SYST BP GE 130 - 139MM HG: CPT | Mod: CPTII,S$GLB,, | Performed by: FAMILY MEDICINE

## 2022-09-13 PROCEDURE — 1160F RVW MEDS BY RX/DR IN RCRD: CPT | Mod: CPTII,S$GLB,, | Performed by: FAMILY MEDICINE

## 2022-09-13 PROCEDURE — 1160F PR REVIEW ALL MEDS BY PRESCRIBER/CLIN PHARMACIST DOCUMENTED: ICD-10-PCS | Mod: CPTII,S$GLB,, | Performed by: FAMILY MEDICINE

## 2022-09-13 PROCEDURE — 3078F PR MOST RECENT DIASTOLIC BLOOD PRESSURE < 80 MM HG: ICD-10-PCS | Mod: CPTII,S$GLB,, | Performed by: FAMILY MEDICINE

## 2022-09-13 PROCEDURE — 1159F PR MEDICATION LIST DOCUMENTED IN MEDICAL RECORD: ICD-10-PCS | Mod: CPTII,S$GLB,, | Performed by: FAMILY MEDICINE

## 2022-09-13 PROCEDURE — 3078F DIAST BP <80 MM HG: CPT | Mod: CPTII,S$GLB,, | Performed by: FAMILY MEDICINE

## 2022-09-13 PROCEDURE — 3008F BODY MASS INDEX DOCD: CPT | Mod: CPTII,S$GLB,, | Performed by: FAMILY MEDICINE

## 2022-09-13 PROCEDURE — 3044F PR MOST RECENT HEMOGLOBIN A1C LEVEL <7.0%: ICD-10-PCS | Mod: CPTII,S$GLB,, | Performed by: FAMILY MEDICINE

## 2022-09-13 PROCEDURE — 99214 PR OFFICE/OUTPT VISIT, EST, LEVL IV, 30-39 MIN: ICD-10-PCS | Mod: S$GLB,,, | Performed by: FAMILY MEDICINE

## 2022-09-13 PROCEDURE — 3008F PR BODY MASS INDEX (BMI) DOCUMENTED: ICD-10-PCS | Mod: CPTII,S$GLB,, | Performed by: FAMILY MEDICINE

## 2022-09-13 PROCEDURE — 3044F HG A1C LEVEL LT 7.0%: CPT | Mod: CPTII,S$GLB,, | Performed by: FAMILY MEDICINE

## 2022-09-13 PROCEDURE — 99214 OFFICE O/P EST MOD 30 MIN: CPT | Mod: S$GLB,,, | Performed by: FAMILY MEDICINE

## 2022-09-13 RX ORDER — FLUCONAZOLE 150 MG/1
150 TABLET ORAL WEEKLY
Qty: 4 TABLET | Refills: 0 | Status: SHIPPED | OUTPATIENT
Start: 2022-09-13 | End: 2022-10-11

## 2022-09-13 RX ORDER — DULOXETIN HYDROCHLORIDE 20 MG/1
20 CAPSULE, DELAYED RELEASE ORAL DAILY
Qty: 30 CAPSULE | Refills: 0 | Status: SHIPPED | OUTPATIENT
Start: 2022-09-13 | End: 2022-10-14 | Stop reason: SDUPTHER

## 2022-09-13 RX ORDER — VALACYCLOVIR HYDROCHLORIDE 1 G/1
1000 TABLET, FILM COATED ORAL EVERY 12 HOURS
Qty: 20 TABLET | Refills: 0 | Status: SHIPPED | OUTPATIENT
Start: 2022-09-13 | End: 2023-01-11

## 2022-09-13 NOTE — PROGRESS NOTES
Subjective:       Patient ID: Rosa Anguiano is a 41 y.o. female.    Chief Complaint: Follow-up and Facial Pain    HPI    New prob and follow up   Pt notes tingling and pain of L face - similar to previous episodes  She is currently on lyrica  Notes adverse side effects on cymbalta and would like to titrate off  C/o vd and itching    Review of Systems   Constitutional:  Negative for chills, diaphoresis, fatigue, fever and unexpected weight change.   HENT:  Negative for nasal congestion, hearing loss, rhinorrhea, sinus pressure/congestion, sore throat, trouble swallowing and voice change.    Eyes:  Negative for pain and visual disturbance.   Respiratory:  Negative for cough, chest tightness, shortness of breath and wheezing.    Cardiovascular:  Negative for chest pain, palpitations and leg swelling.   Gastrointestinal:  Negative for abdominal pain, constipation, diarrhea, nausea and vomiting.   Endocrine: Negative for polydipsia and polyuria.   Genitourinary:  Negative for decreased urine volume, dysuria, flank pain, frequency, hematuria, pelvic pain, vaginal bleeding and vaginal discharge.   Musculoskeletal:  Negative for arthralgias, back pain, myalgias and neck pain.   Integumentary:  Negative for color change, pallor and rash.   Neurological:  Negative for dizziness, syncope, weakness, light-headedness and headaches.   Hematological:  Negative for adenopathy.   Psychiatric/Behavioral:  Negative for decreased concentration, dysphoric mood and sleep disturbance. The patient is not nervous/anxious.        Objective:      Physical Exam  Vitals reviewed.   Constitutional:       General: She is not in acute distress.     Appearance: She is well-developed. She is not diaphoretic.   HENT:      Head: Normocephalic and atraumatic.      Nose: Nose normal.      Mouth/Throat:      Mouth: Mucous membranes are moist.      Pharynx: Oropharynx is clear.   Eyes:      Conjunctiva/sclera: Conjunctivae normal.      Pupils: Pupils are  equal, round, and reactive to light.   Neck:      Thyroid: No thyromegaly.      Vascular: No JVD.   Cardiovascular:      Rate and Rhythm: Normal rate and regular rhythm.      Pulses: Normal pulses.      Heart sounds: Normal heart sounds. No murmur heard.    No friction rub. No gallop.   Pulmonary:      Effort: Pulmonary effort is normal. No respiratory distress.      Breath sounds: Normal breath sounds. No stridor. No wheezing or rales.   Abdominal:      General: Bowel sounds are normal. There is no distension.      Palpations: Abdomen is soft.      Tenderness: There is no abdominal tenderness.   Musculoskeletal:         General: No swelling or tenderness.      Cervical back: Normal range of motion and neck supple.      Right lower leg: No edema.      Left lower leg: No edema.   Lymphadenopathy:      Cervical: No cervical adenopathy.   Skin:     General: Skin is warm and dry.      Coloration: Skin is not pale.      Findings: No erythema or rash.   Neurological:      Mental Status: She is alert and oriented to person, place, and time.   Psychiatric:         Mood and Affect: Mood normal.         Behavior: Behavior normal.       Assessment:       Problem List Items Addressed This Visit    None  Visit Diagnoses       Herpes zoster without complication    -  Primary    treating empirically based on prior hx     Relevant Medications    valACYclovir (VALTREX) 1000 MG tablet    Generalized anxiety disorder        given insttructions for titrating cymbalta    Relevant Medications    DULoxetine (CYMBALTA) 20 MG capsule    Candida vaginitis        Relevant Medications    fluconazole (DIFLUCAN) 150 MG Tab              Plan:       Rosa was seen today for follow-up and facial pain.    Diagnoses and all orders for this visit:    Herpes zoster without complication  Comments:  treating empirically based on prior hx   Orders:  -     valACYclovir (VALTREX) 1000 MG tablet; Take 1 tablet (1,000 mg total) by mouth every 12 (twelve)  hours. for 10 days    Generalized anxiety disorder  Comments:  given insttructions for titrating cymbalta  Orders:  -     DULoxetine (CYMBALTA) 20 MG capsule; Take 1 capsule (20 mg total) by mouth once daily.    Candida vaginitis  -     fluconazole (DIFLUCAN) 150 MG Tab; Take 1 tablet (150 mg total) by mouth once a week.

## 2022-09-14 ENCOUNTER — OFFICE VISIT (OUTPATIENT)
Dept: RHEUMATOLOGY | Facility: CLINIC | Age: 42
End: 2022-09-14
Payer: COMMERCIAL

## 2022-09-14 VITALS
DIASTOLIC BLOOD PRESSURE: 82 MMHG | RESPIRATION RATE: 16 BRPM | BODY MASS INDEX: 33.8 KG/M2 | OXYGEN SATURATION: 99 % | HEIGHT: 59 IN | WEIGHT: 167.63 LBS | SYSTOLIC BLOOD PRESSURE: 125 MMHG | HEART RATE: 82 BPM

## 2022-09-14 DIAGNOSIS — M47.812 CERVICAL SPONDYLOSIS: ICD-10-CM

## 2022-09-14 DIAGNOSIS — R68.89 NONSPECIFIC ABNORMAL FINDING: ICD-10-CM

## 2022-09-14 DIAGNOSIS — Z13.89 SCREENING FOR RHEUMATIC DISORDER: ICD-10-CM

## 2022-09-14 DIAGNOSIS — M25.50 POLYARTHRALGIA: ICD-10-CM

## 2022-09-14 DIAGNOSIS — M79.2 NEURALGIA: ICD-10-CM

## 2022-09-14 DIAGNOSIS — Z51.81 MEDICATION MONITORING ENCOUNTER: ICD-10-CM

## 2022-09-14 DIAGNOSIS — M05.79 RHEUMATOID ARTHRITIS INVOLVING MULTIPLE SITES WITH POSITIVE RHEUMATOID FACTOR: Primary | ICD-10-CM

## 2022-09-14 DIAGNOSIS — M25.59 PAIN IN OTHER JOINT: ICD-10-CM

## 2022-09-14 DIAGNOSIS — R74.01 ELEVATED LIVER TRANSAMINASE LEVEL: ICD-10-CM

## 2022-09-14 DIAGNOSIS — M79.10 MYALGIA: ICD-10-CM

## 2022-09-14 PROCEDURE — 3008F PR BODY MASS INDEX (BMI) DOCUMENTED: ICD-10-PCS | Mod: CPTII,S$GLB,, | Performed by: INTERNAL MEDICINE

## 2022-09-14 PROCEDURE — 99214 PR OFFICE/OUTPT VISIT, EST, LEVL IV, 30-39 MIN: ICD-10-PCS | Mod: S$GLB,,, | Performed by: INTERNAL MEDICINE

## 2022-09-14 PROCEDURE — 1160F PR REVIEW ALL MEDS BY PRESCRIBER/CLIN PHARMACIST DOCUMENTED: ICD-10-PCS | Mod: CPTII,S$GLB,, | Performed by: INTERNAL MEDICINE

## 2022-09-14 PROCEDURE — 3079F DIAST BP 80-89 MM HG: CPT | Mod: CPTII,S$GLB,, | Performed by: INTERNAL MEDICINE

## 2022-09-14 PROCEDURE — 1159F PR MEDICATION LIST DOCUMENTED IN MEDICAL RECORD: ICD-10-PCS | Mod: CPTII,S$GLB,, | Performed by: INTERNAL MEDICINE

## 2022-09-14 PROCEDURE — 3074F SYST BP LT 130 MM HG: CPT | Mod: CPTII,S$GLB,, | Performed by: INTERNAL MEDICINE

## 2022-09-14 PROCEDURE — 3079F PR MOST RECENT DIASTOLIC BLOOD PRESSURE 80-89 MM HG: ICD-10-PCS | Mod: CPTII,S$GLB,, | Performed by: INTERNAL MEDICINE

## 2022-09-14 PROCEDURE — 3044F HG A1C LEVEL LT 7.0%: CPT | Mod: CPTII,S$GLB,, | Performed by: INTERNAL MEDICINE

## 2022-09-14 PROCEDURE — 3044F PR MOST RECENT HEMOGLOBIN A1C LEVEL <7.0%: ICD-10-PCS | Mod: CPTII,S$GLB,, | Performed by: INTERNAL MEDICINE

## 2022-09-14 PROCEDURE — 99214 OFFICE O/P EST MOD 30 MIN: CPT | Mod: S$GLB,,, | Performed by: INTERNAL MEDICINE

## 2022-09-14 PROCEDURE — 3008F BODY MASS INDEX DOCD: CPT | Mod: CPTII,S$GLB,, | Performed by: INTERNAL MEDICINE

## 2022-09-14 PROCEDURE — 1159F MED LIST DOCD IN RCRD: CPT | Mod: CPTII,S$GLB,, | Performed by: INTERNAL MEDICINE

## 2022-09-14 PROCEDURE — 1160F RVW MEDS BY RX/DR IN RCRD: CPT | Mod: CPTII,S$GLB,, | Performed by: INTERNAL MEDICINE

## 2022-09-14 PROCEDURE — 3074F PR MOST RECENT SYSTOLIC BLOOD PRESSURE < 130 MM HG: ICD-10-PCS | Mod: CPTII,S$GLB,, | Performed by: INTERNAL MEDICINE

## 2022-09-14 RX ORDER — DICLOFENAC SODIUM 50 MG/1
50 TABLET, DELAYED RELEASE ORAL 2 TIMES DAILY PRN
Qty: 60 TABLET | Refills: 1 | Status: SHIPPED | OUTPATIENT
Start: 2022-09-14 | End: 2022-11-09 | Stop reason: SDUPTHER

## 2022-09-14 RX ORDER — PREGABALIN 150 MG/1
150 CAPSULE ORAL 2 TIMES DAILY
Qty: 60 CAPSULE | Refills: 1 | Status: SHIPPED | OUTPATIENT
Start: 2022-09-14 | End: 2022-11-09 | Stop reason: SDUPTHER

## 2022-09-14 RX ORDER — HYDROXYCHLOROQUINE SULFATE 200 MG/1
200 TABLET, FILM COATED ORAL DAILY
Qty: 30 TABLET | Refills: 2 | Status: SHIPPED | OUTPATIENT
Start: 2022-09-14 | End: 2022-11-09 | Stop reason: SDUPTHER

## 2022-09-14 NOTE — PATIENT INSTRUCTIONS
Hydroxychloroquine (Plaquenil) is considered a disease-modifying anti-rheumatic drug (DMARD). It can decrease the pain and swelling of arthritis. It may prevent joint damage and reduce the risk of long-term disability. Hydroxychloroquine is in a class of medications that was first used to prevent and treat malaria. Today, it is used to treat rheumatoid arthritis, some symptoms of lupus, childhood arthritis (or juvenile idiopathic arthritis) and other autoimmune diseases.     How to Take It  Hydroxychloroquine comes in an oral tablet. Adult dosing ranges from 200 mg or 400 mg per day (6.5mg/kg). In some cases, higher doses can be used. It is recommended one tablet twice daily if taking more than one tablet. It is recommended to be taken with food. Symptoms can start to improve in one to two months, but it may take up to six months before the full benefits of this medication are experienced.    Hydroxychloroquine typically is very well tolerated. Serious side effects are rare. The most common side effects are nausea and diarrhea, which often improve with time. Less common side effects include rash, changes in skin pigment (such as darkening or dark spots)    In rare cases, hydroxychloroquine can cause visual changes or loss of vision. Such vision problems are more likely to occur in individuals taking high doses for many years, individuals 60 years or older, or those with significant kidney disease. At the recommended dose, development of visual problems due to the medication is rare. It is recommended that you have an eye exam within the first year of use, then repeat every one to five years based on current guidelines.

## 2022-09-20 ENCOUNTER — PATIENT MESSAGE (OUTPATIENT)
Dept: FAMILY MEDICINE | Facility: CLINIC | Age: 42
End: 2022-09-20
Payer: COMMERCIAL

## 2022-09-22 ENCOUNTER — OFFICE VISIT (OUTPATIENT)
Dept: FAMILY MEDICINE | Facility: CLINIC | Age: 42
End: 2022-09-22
Payer: COMMERCIAL

## 2022-09-22 VITALS
HEART RATE: 92 BPM | OXYGEN SATURATION: 97 % | DIASTOLIC BLOOD PRESSURE: 64 MMHG | RESPIRATION RATE: 14 BRPM | SYSTOLIC BLOOD PRESSURE: 108 MMHG

## 2022-09-22 DIAGNOSIS — B02.9 HERPES ZOSTER WITHOUT COMPLICATION: Primary | ICD-10-CM

## 2022-09-22 LAB — BCS RECOMMENDATION EXT: NORMAL

## 2022-09-22 PROCEDURE — 3078F PR MOST RECENT DIASTOLIC BLOOD PRESSURE < 80 MM HG: ICD-10-PCS | Mod: CPTII,S$GLB,, | Performed by: PHYSICIAN ASSISTANT

## 2022-09-22 PROCEDURE — 99213 OFFICE O/P EST LOW 20 MIN: CPT | Mod: S$GLB,,, | Performed by: PHYSICIAN ASSISTANT

## 2022-09-22 PROCEDURE — 3074F SYST BP LT 130 MM HG: CPT | Mod: CPTII,S$GLB,, | Performed by: PHYSICIAN ASSISTANT

## 2022-09-22 PROCEDURE — 99213 PR OFFICE/OUTPT VISIT, EST, LEVL III, 20-29 MIN: ICD-10-PCS | Mod: S$GLB,,, | Performed by: PHYSICIAN ASSISTANT

## 2022-09-22 PROCEDURE — 3044F PR MOST RECENT HEMOGLOBIN A1C LEVEL <7.0%: ICD-10-PCS | Mod: CPTII,S$GLB,, | Performed by: PHYSICIAN ASSISTANT

## 2022-09-22 PROCEDURE — 3078F DIAST BP <80 MM HG: CPT | Mod: CPTII,S$GLB,, | Performed by: PHYSICIAN ASSISTANT

## 2022-09-22 PROCEDURE — 3044F HG A1C LEVEL LT 7.0%: CPT | Mod: CPTII,S$GLB,, | Performed by: PHYSICIAN ASSISTANT

## 2022-09-22 PROCEDURE — 3074F PR MOST RECENT SYSTOLIC BLOOD PRESSURE < 130 MM HG: ICD-10-PCS | Mod: CPTII,S$GLB,, | Performed by: PHYSICIAN ASSISTANT

## 2022-09-22 RX ORDER — HYDROCODONE BITARTRATE AND ACETAMINOPHEN 5; 325 MG/1; MG/1
1 TABLET ORAL EVERY 8 HOURS PRN
Qty: 21 TABLET | Refills: 0 | Status: SHIPPED | OUTPATIENT
Start: 2022-09-22 | End: 2022-09-29

## 2022-09-22 RX ORDER — PREDNISONE 20 MG/1
20 TABLET ORAL DAILY
Qty: 5 TABLET | Refills: 0 | Status: SHIPPED | OUTPATIENT
Start: 2022-09-22 | End: 2022-09-27

## 2022-09-22 NOTE — PROGRESS NOTES
Subjective:      Patient ID: Rosa Anguiano is a 42 y.o. female.    Chief Complaint: Follow-up, Herpes Zoster, and Facial Pain      HPI    Review of Systems    Medication List with Changes/Refills   Current Medications    BORIC ACID (PH-D) 600 MG VAGINAL SUPPOSITORY    Place 1 suppository (600 mg total) vaginally every evening. As directed    DICLOFENAC (VOLTAREN) 50 MG EC TABLET    Take 1 tablet (50 mg total) by mouth 2 (two) times daily as needed (pain).    DULOXETINE (CYMBALTA) 20 MG CAPSULE    Take 1 capsule (20 mg total) by mouth once daily.    FAMOTIDINE (PEPCID) 20 MG TABLET    TAKE 1 TABLET BY MOUTH TWICE DAILY AS NEEDED FOR STOMACH ACID OR REFLUX OR HEARTBURN    FLUCONAZOLE (DIFLUCAN) 150 MG TAB    Take 1 tablet (150 mg total) by mouth once a week.    FLUTICASONE PROPIONATE (FLONASE) 50 MCG/ACTUATION NASAL SPRAY    2 sprays by Each Nostril route 2 (two) times daily.    HYDROXYCHLOROQUINE (PLAQUENIL) 200 MG TABLET    Take 1 tablet (200 mg total) by mouth once daily.    HYDROXYZINE PAMOATE (VISTARIL) 25 MG CAP        HYOSCYAMINE (ANASPAZ,LEVSIN) 0.125 MG TAB    Take 1 tablet (125 mcg total) by mouth every 4 (four) hours as needed (stomach pain).    METHEN-M.BLUE-S.PHOS-PHSAL-HYO (URIBEL) 118-10-40.8-36 MG CAP    TAKE 1 CAPSULE BY MOUTH THREE TIMES DAILY AS NEEDED FOR DYSURIA/BLADDER SPASMS    MISCELLANEOUS MEDICAL SUPPLY KIT    Auralgan drops for ear pain    OXYBUTYNIN (DITROPAN XL) 15 MG TR24    Take 1 tablet (15 mg total) by mouth once daily.    OZEMPIC 2 MG/DOSE (8 MG/3 ML) PNIJ        PANTOPRAZOLE (PROTONIX) 40 MG TABLET        PREGABALIN (LYRICA) 150 MG CAPSULE    Take 1 capsule (150 mg total) by mouth 2 (two) times daily.    PROAIR HFA 90 MCG/ACTUATION INHALER    INL 2 PFS PO Q 4 H    VALACYCLOVIR (VALTREX) 1000 MG TABLET    Take 1 tablet (1,000 mg total) by mouth every 12 (twelve) hours. for 10 days        Objective:     Vitals:    09/22/22 1023   BP: 108/64   Pulse: 92   Resp: 14   SpO2: 97%         Physical Exam       Assessment & Plan:     There are no diagnoses linked to this encounter.       No follow-ups on file.       -Patient instructed that our office calls back on all labs and imaging within 1 week of receiving the results. Patient instructed to reach out to our office if they have not heard from us so that we can request the results from the lab/imaging center           Tameka Dyson PA-C

## 2022-09-22 NOTE — PROGRESS NOTES
Subjective:      Patient ID: Rosa Anguiano is a 42 y.o. female.    Chief Complaint: Follow-up, Herpes Zoster, and Facial Pain      HPI  Pt is here today with a cc of right sided facial pain and burning x 3 days . Constant.  Recent dx of shingles . Nearly completed course of valtrex.   Pt reports the pain is keeping her awake at night.     Review of Systems   Constitutional:  Negative for activity change, appetite change, chills, diaphoresis, fatigue and unexpected weight change.   HENT:  Negative for dental problem, ear pain, facial swelling and mouth sores.    Eyes:  Negative for visual disturbance.   Respiratory:  Negative for cough, chest tightness, shortness of breath and wheezing.    Cardiovascular:  Negative for chest pain, palpitations and leg swelling.   Gastrointestinal:  Negative for abdominal pain, constipation, nausea and vomiting.   Musculoskeletal:  Positive for arthralgias.   Neurological:  Negative for dizziness, vertigo, tremors, syncope, weakness, light-headedness, numbness and headaches.   Psychiatric/Behavioral:  Negative for agitation, behavioral problems, confusion, decreased concentration, dysphoric mood, hallucinations, self-injury, sleep disturbance and suicidal ideas. The patient is not nervous/anxious and is not hyperactive.      Medication List with Changes/Refills   New Medications    HYDROCODONE-ACETAMINOPHEN (NORCO) 5-325 MG PER TABLET    Take 1 tablet by mouth every 8 (eight) hours as needed for Pain.    PREDNISONE (DELTASONE) 20 MG TABLET    Take 1 tablet (20 mg total) by mouth once daily. for 5 days   Current Medications    BORIC ACID (PH-D) 600 MG VAGINAL SUPPOSITORY    Place 1 suppository (600 mg total) vaginally every evening. As directed    DICLOFENAC (VOLTAREN) 50 MG EC TABLET    Take 1 tablet (50 mg total) by mouth 2 (two) times daily as needed (pain).    DULOXETINE (CYMBALTA) 20 MG CAPSULE    Take 1 capsule (20 mg total) by mouth once daily.    FAMOTIDINE (PEPCID) 20 MG TABLET     TAKE 1 TABLET BY MOUTH TWICE DAILY AS NEEDED FOR STOMACH ACID OR REFLUX OR HEARTBURN    FLUCONAZOLE (DIFLUCAN) 150 MG TAB    Take 1 tablet (150 mg total) by mouth once a week.    FLUTICASONE PROPIONATE (FLONASE) 50 MCG/ACTUATION NASAL SPRAY    2 sprays by Each Nostril route 2 (two) times daily.    HYDROXYCHLOROQUINE (PLAQUENIL) 200 MG TABLET    Take 1 tablet (200 mg total) by mouth once daily.    HYDROXYZINE PAMOATE (VISTARIL) 25 MG CAP        HYOSCYAMINE (ANASPAZ,LEVSIN) 0.125 MG TAB    Take 1 tablet (125 mcg total) by mouth every 4 (four) hours as needed (stomach pain).    METHEN-M.BLUE-S.PHOS-PHSAL-HYO (URIBEL) 118-10-40.8-36 MG CAP    TAKE 1 CAPSULE BY MOUTH THREE TIMES DAILY AS NEEDED FOR DYSURIA/BLADDER SPASMS    MISCELLANEOUS MEDICAL SUPPLY KIT    Auralgan drops for ear pain    OXYBUTYNIN (DITROPAN XL) 15 MG TR24    Take 1 tablet (15 mg total) by mouth once daily.    OZEMPIC 2 MG/DOSE (8 MG/3 ML) PNIJ        PANTOPRAZOLE (PROTONIX) 40 MG TABLET        PREGABALIN (LYRICA) 150 MG CAPSULE    Take 1 capsule (150 mg total) by mouth 2 (two) times daily.    PROAIR HFA 90 MCG/ACTUATION INHALER    INL 2 PFS PO Q 4 H    VALACYCLOVIR (VALTREX) 1000 MG TABLET    Take 1 tablet (1,000 mg total) by mouth every 12 (twelve) hours. for 10 days        Objective:     Vitals:    09/22/22 1023   BP: 108/64   Pulse: 92   Resp: 14   SpO2: 97%        Physical Exam  Constitutional:       Appearance: Normal appearance. She is normal weight.   HENT:      Head: Normocephalic and atraumatic.      Nose: Nose normal.   Eyes:      Conjunctiva/sclera: Conjunctivae normal.      Comments: Eyes tracking normal on exam    Cardiovascular:      Rate and Rhythm: Normal rate and regular rhythm.      Pulses: Normal pulses.      Heart sounds: Normal heart sounds. No murmur heard.    No friction rub. No gallop.   Pulmonary:      Effort: Pulmonary effort is normal. No respiratory distress.      Breath sounds: Normal breath sounds. No stridor. No  wheezing, rhonchi or rales.   Musculoskeletal:      Right lower leg: No edema.      Left lower leg: No edema.      Comments: Moves all extremities well and with good control  Normal gait observed      Skin:     General: Skin is warm and dry.      Capillary Refill: Capillary refill takes less than 2 seconds.   Neurological:      Mental Status: She is alert and oriented to person, place, and time.   Psychiatric:         Mood and Affect: Mood normal.         Behavior: Behavior normal.         Thought Content: Thought content normal.         Judgment: Judgment normal.          Assessment & Plan:     Herpes zoster without complication  -     HYDROcodone-acetaminophen (NORCO) 5-325 mg per tablet; Take 1 tablet by mouth every 8 (eight) hours as needed for Pain.  Dispense: 21 tablet; Refill: 0  -     predniSONE (DELTASONE) 20 MG tablet; Take 1 tablet (20 mg total) by mouth once daily. for 5 days  Dispense: 5 tablet; Refill: 0        reviewed   Medication precautions given   Stop prednisone if glucose greater then 250          -Patient instructed that our office calls back on all labs and imaging within 1 week of receiving the results. Patient instructed to reach out to our office if they have not heard from us so that we can request the results from the lab/imaging center           Tameka Dyson PA-C

## 2022-10-05 ENCOUNTER — TELEPHONE (OUTPATIENT)
Dept: FAMILY MEDICINE | Facility: CLINIC | Age: 42
End: 2022-10-05
Payer: COMMERCIAL

## 2022-10-05 NOTE — TELEPHONE ENCOUNTER
----- Message from Tameka Dyson PA-C sent at 10/5/2022  3:29 PM CDT -----  Contact: pt  She can see Dr. Clark tomorrow at 11   ----- Message -----  From: Jane Bae  Sent: 10/5/2022   3:02 PM CDT  To: Chana Clark MD, Tameka Dyson PA-C      ----- Message -----  From: Olivia Colón MA  Sent: 10/5/2022   2:33 PM CDT  To: Jane Bae      ----- Message -----  From: Parish Hunter  Sent: 10/5/2022   2:03 PM CDT  To: Eduardo Lieberman ([facility]) Staff    .Type:  Same Day Appointment Request    Caller is requesting a same day appointment.  Caller declined first available appointment listed below.    Name of Caller:lisandro   When is the first available appointment?cb  Symptoms:headaches & earache  Best Call Back Number:.[phone]    Additional Information: [id number] Eduardo              
Called and spoke with patient to let her know that sh can come in on tomorrow at 11:00am to see    
walking/standing

## 2022-10-06 ENCOUNTER — OFFICE VISIT (OUTPATIENT)
Dept: FAMILY MEDICINE | Facility: CLINIC | Age: 42
End: 2022-10-06
Payer: COMMERCIAL

## 2022-10-06 VITALS
SYSTOLIC BLOOD PRESSURE: 117 MMHG | HEIGHT: 59 IN | WEIGHT: 167.19 LBS | OXYGEN SATURATION: 98 % | BODY MASS INDEX: 33.71 KG/M2 | DIASTOLIC BLOOD PRESSURE: 84 MMHG | HEART RATE: 79 BPM

## 2022-10-06 DIAGNOSIS — H66.90 OTITIS MEDIA, UNSPECIFIED LATERALITY, UNSPECIFIED OTITIS MEDIA TYPE: ICD-10-CM

## 2022-10-06 DIAGNOSIS — E11.69 TYPE 2 DIABETES MELLITUS WITH OTHER SPECIFIED COMPLICATION, WITHOUT LONG-TERM CURRENT USE OF INSULIN: ICD-10-CM

## 2022-10-06 DIAGNOSIS — G43.109 MIGRAINE WITH AURA AND WITHOUT STATUS MIGRAINOSUS, NOT INTRACTABLE: Primary | ICD-10-CM

## 2022-10-06 PROCEDURE — 3008F BODY MASS INDEX DOCD: CPT | Mod: CPTII,S$GLB,, | Performed by: FAMILY MEDICINE

## 2022-10-06 PROCEDURE — 3079F PR MOST RECENT DIASTOLIC BLOOD PRESSURE 80-89 MM HG: ICD-10-PCS | Mod: CPTII,S$GLB,, | Performed by: FAMILY MEDICINE

## 2022-10-06 PROCEDURE — 99214 PR OFFICE/OUTPT VISIT, EST, LEVL IV, 30-39 MIN: ICD-10-PCS | Mod: S$GLB,,, | Performed by: FAMILY MEDICINE

## 2022-10-06 PROCEDURE — 3008F PR BODY MASS INDEX (BMI) DOCUMENTED: ICD-10-PCS | Mod: CPTII,S$GLB,, | Performed by: FAMILY MEDICINE

## 2022-10-06 PROCEDURE — 1160F PR REVIEW ALL MEDS BY PRESCRIBER/CLIN PHARMACIST DOCUMENTED: ICD-10-PCS | Mod: CPTII,S$GLB,, | Performed by: FAMILY MEDICINE

## 2022-10-06 PROCEDURE — 3079F DIAST BP 80-89 MM HG: CPT | Mod: CPTII,S$GLB,, | Performed by: FAMILY MEDICINE

## 2022-10-06 PROCEDURE — 3044F HG A1C LEVEL LT 7.0%: CPT | Mod: CPTII,S$GLB,, | Performed by: FAMILY MEDICINE

## 2022-10-06 PROCEDURE — 3074F SYST BP LT 130 MM HG: CPT | Mod: CPTII,S$GLB,, | Performed by: FAMILY MEDICINE

## 2022-10-06 PROCEDURE — 1159F MED LIST DOCD IN RCRD: CPT | Mod: CPTII,S$GLB,, | Performed by: FAMILY MEDICINE

## 2022-10-06 PROCEDURE — 1159F PR MEDICATION LIST DOCUMENTED IN MEDICAL RECORD: ICD-10-PCS | Mod: CPTII,S$GLB,, | Performed by: FAMILY MEDICINE

## 2022-10-06 PROCEDURE — 3044F PR MOST RECENT HEMOGLOBIN A1C LEVEL <7.0%: ICD-10-PCS | Mod: CPTII,S$GLB,, | Performed by: FAMILY MEDICINE

## 2022-10-06 PROCEDURE — 3074F PR MOST RECENT SYSTOLIC BLOOD PRESSURE < 130 MM HG: ICD-10-PCS | Mod: CPTII,S$GLB,, | Performed by: FAMILY MEDICINE

## 2022-10-06 PROCEDURE — 1160F RVW MEDS BY RX/DR IN RCRD: CPT | Mod: CPTII,S$GLB,, | Performed by: FAMILY MEDICINE

## 2022-10-06 PROCEDURE — 99214 OFFICE O/P EST MOD 30 MIN: CPT | Mod: S$GLB,,, | Performed by: FAMILY MEDICINE

## 2022-10-06 RX ORDER — KETOROLAC TROMETHAMINE 30 MG/ML
30 INJECTION, SOLUTION INTRAMUSCULAR; INTRAVENOUS
Status: SHIPPED | OUTPATIENT
Start: 2022-10-06 | End: 2022-10-09

## 2022-10-06 RX ORDER — BLOOD-GLUCOSE TRANSMITTER
EACH MISCELLANEOUS
Qty: 1 EACH | Refills: 0 | Status: SHIPPED | OUTPATIENT
Start: 2022-10-06

## 2022-10-06 RX ORDER — CEFDINIR 300 MG/1
300 CAPSULE ORAL EVERY 12 HOURS
Qty: 14 CAPSULE | Refills: 0 | Status: SHIPPED | OUTPATIENT
Start: 2022-10-06 | End: 2022-10-14

## 2022-10-06 RX ORDER — BLOOD-GLUCOSE SENSOR
EACH MISCELLANEOUS
Qty: 6 EACH | Refills: 1 | Status: SHIPPED | OUTPATIENT
Start: 2022-10-06

## 2022-10-06 RX ORDER — BUTALBITAL, ACETAMINOPHEN, CAFFEINE AND CODEINE PHOSPHATE 50; 325; 40; 30 MG/1; MG/1; MG/1; MG/1
1 CAPSULE ORAL EVERY 4 HOURS PRN
Qty: 30 EACH | Refills: 0 | Status: SHIPPED | OUTPATIENT
Start: 2022-10-06 | End: 2022-11-05

## 2022-10-06 RX ORDER — BLOOD-GLUCOSE,RECEIVER,CONT
EACH MISCELLANEOUS
Qty: 1 EACH | Refills: 0 | Status: SHIPPED | OUTPATIENT
Start: 2022-10-06

## 2022-10-06 NOTE — PROGRESS NOTES
Subjective:       Patient ID: Rosa Anguiano is a 42 y.o. female.    Chief Complaint: Headache (Patient is here for headaches and an ear ache )    HPI    New prob as below  DM - interested in dexcom monitoring. Having some bg fluctuations. Endo is considering decreasing ozempic  Pt reports new prob - severe headache and R ear pain x 3 days. Has been taking otc meds without improvement or relief  Having some photophobia and nausea with ha    Review of Systems   Constitutional:  Negative for chills, diaphoresis, fatigue, fever and unexpected weight change.   HENT:  Positive for ear pain. Negative for nasal congestion, ear discharge, hearing loss, rhinorrhea, sinus pressure/congestion, sore throat, trouble swallowing and voice change.    Eyes:  Negative for pain and visual disturbance.   Respiratory:  Negative for cough, chest tightness, shortness of breath and wheezing.    Cardiovascular:  Negative for chest pain, palpitations and leg swelling.   Gastrointestinal:  Negative for abdominal pain, constipation, diarrhea, nausea and vomiting.   Genitourinary:  Negative for decreased urine volume, dysuria, flank pain, frequency, hematuria, pelvic pain, vaginal bleeding and vaginal discharge.   Musculoskeletal:  Negative for arthralgias, back pain, myalgias and neck pain.   Integumentary:  Negative for color change, pallor and rash.   Neurological:  Positive for headaches. Negative for dizziness, syncope, weakness and light-headedness.   Hematological:  Negative for adenopathy.   Psychiatric/Behavioral:  Negative for decreased concentration, dysphoric mood and sleep disturbance. The patient is not nervous/anxious.        Objective:      Physical Exam  Vitals reviewed.   Constitutional:       General: She is not in acute distress.     Appearance: She is well-developed. She is not diaphoretic.   HENT:      Head: Normocephalic and atraumatic.      Right Ear: External ear normal. Swelling present. Tympanic membrane is erythematous.       Left Ear: External ear normal. Tympanic membrane is not erythematous.      Nose: Nose normal.      Mouth/Throat:      Mouth: Mucous membranes are moist.      Pharynx: Oropharynx is clear.   Eyes:      Conjunctiva/sclera: Conjunctivae normal.      Pupils: Pupils are equal, round, and reactive to light.   Neck:      Thyroid: No thyromegaly.      Vascular: No JVD.   Cardiovascular:      Rate and Rhythm: Normal rate and regular rhythm.      Pulses: Normal pulses.      Heart sounds: Normal heart sounds. No murmur heard.    No friction rub. No gallop.   Pulmonary:      Effort: Pulmonary effort is normal. No respiratory distress.      Breath sounds: Normal breath sounds. No stridor. No wheezing or rales.   Abdominal:      General: Bowel sounds are normal. There is no distension.      Palpations: Abdomen is soft.      Tenderness: There is no abdominal tenderness.   Musculoskeletal:         General: No swelling or tenderness.      Cervical back: Normal range of motion and neck supple.      Right lower leg: No edema.      Left lower leg: No edema.   Lymphadenopathy:      Cervical: No cervical adenopathy.   Skin:     General: Skin is warm and dry.      Coloration: Skin is not pale.      Findings: No erythema or rash.   Neurological:      Mental Status: She is alert and oriented to person, place, and time.   Psychiatric:         Mood and Affect: Mood normal.         Behavior: Behavior normal.       Assessment:       Problem List Items Addressed This Visit          Endocrine    Type 2 diabetes mellitus, without long-term current use of insulin    Relevant Medications    blood-glucose meter,continuous (DEXCOM G6 ) Misc    blood-glucose sensor (DEXCOM G6 SENSOR) Kath    blood-glucose transmitter (DEXCOM G6 TRANSMITTER) Kath     Other Visit Diagnoses       Migraine with aura and without status migrainosus, not intractable    -  Primary    Relevant Medications    ketorolac injection 30 mg     butalbitaL-acetaminop-caf-cod -91-30 mg Cap    Otitis media, unspecified laterality, unspecified otitis media type        Relevant Medications    cefdinir (OMNICEF) 300 MG capsule              Plan:       Rosa was seen today for headache.    Diagnoses and all orders for this visit:    Migraine with aura and without status migrainosus, not intractable  -     ketorolac injection 30 mg  -     butalbitaL-acetaminop-caf-cod -93-30 mg Cap; Take 1 capsule by mouth every 4 (four) hours as needed (headache).    Otitis media, unspecified laterality, unspecified otitis media type  -     cefdinir (OMNICEF) 300 MG capsule; Take 1 capsule (300 mg total) by mouth every 12 (twelve) hours.    Type 2 diabetes mellitus with other specified complication, without long-term current use of insulin  -     blood-glucose meter,continuous (DEXCOM G6 ) Misc; Use as instructed  -     blood-glucose sensor (DEXCOM G6 SENSOR) Kath; Use as instructed  -     blood-glucose transmitter (DEXCOM G6 TRANSMITTER) Kath; Use as instructed

## 2022-10-07 ENCOUNTER — PATIENT MESSAGE (OUTPATIENT)
Dept: FAMILY MEDICINE | Facility: CLINIC | Age: 42
End: 2022-10-07
Payer: COMMERCIAL

## 2022-10-07 RX ORDER — BUTALBITAL, ACETAMINOPHEN AND CAFFEINE 50; 325; 40 MG/1; MG/1; MG/1
1 TABLET ORAL EVERY 4 HOURS PRN
Qty: 60 TABLET | Refills: 0 | Status: SHIPPED | OUTPATIENT
Start: 2022-10-07 | End: 2022-11-06

## 2022-10-13 ENCOUNTER — TELEPHONE (OUTPATIENT)
Dept: UROLOGY | Facility: CLINIC | Age: 42
End: 2022-10-13
Payer: COMMERCIAL

## 2022-10-13 ENCOUNTER — PATIENT OUTREACH (OUTPATIENT)
Dept: ADMINISTRATIVE | Facility: HOSPITAL | Age: 42
End: 2022-10-13
Payer: COMMERCIAL

## 2022-10-13 NOTE — TELEPHONE ENCOUNTER
"Notified pt of medication sent out to pharmacy by provider.ED----- Message from Zahraa Marsh sent at 10/13/2022  8:50 AM CDT -----  Type:  RX Refill Request    Who Called: Rosa Anguiano    Refill or New Rx: refill   RX Name and Strength:methen-m.blue-s.phos-phsal-hyo (URIBEL) 118-10-40.8-36 mg Cap  How is the patient currently taking it? (ex. 1XDay):3xday   Is this a 30 day or 90 day RX:30  Preferred Pharmacy with phone number:  [facility] - [address] [place] [id number]  Phone: [phone] Fax: [phone]    Local or Mail Order:Local   Ordering Provider:Bradley   Would the patient rather a call back or a response via [facility]?    Best Call Back Number:[phone]    Additional Information:          "

## 2022-10-14 ENCOUNTER — OFFICE VISIT (OUTPATIENT)
Dept: FAMILY MEDICINE | Facility: CLINIC | Age: 42
End: 2022-10-14
Payer: COMMERCIAL

## 2022-10-14 ENCOUNTER — TELEPHONE (OUTPATIENT)
Dept: RHEUMATOLOGY | Facility: CLINIC | Age: 42
End: 2022-10-14
Payer: COMMERCIAL

## 2022-10-14 VITALS
OXYGEN SATURATION: 98 % | WEIGHT: 168.13 LBS | BODY MASS INDEX: 33.89 KG/M2 | SYSTOLIC BLOOD PRESSURE: 123 MMHG | HEIGHT: 59 IN | HEART RATE: 93 BPM | DIASTOLIC BLOOD PRESSURE: 78 MMHG

## 2022-10-14 DIAGNOSIS — K58.9 IRRITABLE BOWEL SYNDROME, UNSPECIFIED TYPE: ICD-10-CM

## 2022-10-14 DIAGNOSIS — H66.90 OTITIS MEDIA, UNSPECIFIED LATERALITY, UNSPECIFIED OTITIS MEDIA TYPE: ICD-10-CM

## 2022-10-14 DIAGNOSIS — F41.1 GENERALIZED ANXIETY DISORDER: Primary | ICD-10-CM

## 2022-10-14 DIAGNOSIS — M25.50 POLYARTHRALGIA: ICD-10-CM

## 2022-10-14 PROCEDURE — 1159F MED LIST DOCD IN RCRD: CPT | Mod: CPTII,S$GLB,, | Performed by: FAMILY MEDICINE

## 2022-10-14 PROCEDURE — 3074F PR MOST RECENT SYSTOLIC BLOOD PRESSURE < 130 MM HG: ICD-10-PCS | Mod: CPTII,S$GLB,, | Performed by: FAMILY MEDICINE

## 2022-10-14 PROCEDURE — 3074F SYST BP LT 130 MM HG: CPT | Mod: CPTII,S$GLB,, | Performed by: FAMILY MEDICINE

## 2022-10-14 PROCEDURE — 3078F PR MOST RECENT DIASTOLIC BLOOD PRESSURE < 80 MM HG: ICD-10-PCS | Mod: CPTII,S$GLB,, | Performed by: FAMILY MEDICINE

## 2022-10-14 PROCEDURE — 1160F PR REVIEW ALL MEDS BY PRESCRIBER/CLIN PHARMACIST DOCUMENTED: ICD-10-PCS | Mod: CPTII,S$GLB,, | Performed by: FAMILY MEDICINE

## 2022-10-14 PROCEDURE — 1159F PR MEDICATION LIST DOCUMENTED IN MEDICAL RECORD: ICD-10-PCS | Mod: CPTII,S$GLB,, | Performed by: FAMILY MEDICINE

## 2022-10-14 PROCEDURE — 3044F PR MOST RECENT HEMOGLOBIN A1C LEVEL <7.0%: ICD-10-PCS | Mod: CPTII,S$GLB,, | Performed by: FAMILY MEDICINE

## 2022-10-14 PROCEDURE — 96372 PR INJECTION,THERAP/PROPH/DIAG2ST, IM OR SUBCUT: ICD-10-PCS | Mod: S$GLB,,, | Performed by: FAMILY MEDICINE

## 2022-10-14 PROCEDURE — 1160F RVW MEDS BY RX/DR IN RCRD: CPT | Mod: CPTII,S$GLB,, | Performed by: FAMILY MEDICINE

## 2022-10-14 PROCEDURE — 3078F DIAST BP <80 MM HG: CPT | Mod: CPTII,S$GLB,, | Performed by: FAMILY MEDICINE

## 2022-10-14 PROCEDURE — 96372 THER/PROPH/DIAG INJ SC/IM: CPT | Mod: S$GLB,,, | Performed by: FAMILY MEDICINE

## 2022-10-14 PROCEDURE — 99214 PR OFFICE/OUTPT VISIT, EST, LEVL IV, 30-39 MIN: ICD-10-PCS | Mod: 25,S$GLB,, | Performed by: FAMILY MEDICINE

## 2022-10-14 PROCEDURE — 99214 OFFICE O/P EST MOD 30 MIN: CPT | Mod: 25,S$GLB,, | Performed by: FAMILY MEDICINE

## 2022-10-14 PROCEDURE — 3044F HG A1C LEVEL LT 7.0%: CPT | Mod: CPTII,S$GLB,, | Performed by: FAMILY MEDICINE

## 2022-10-14 RX ORDER — KETOROLAC TROMETHAMINE 30 MG/ML
60 INJECTION, SOLUTION INTRAMUSCULAR; INTRAVENOUS
Status: COMPLETED | OUTPATIENT
Start: 2022-10-14 | End: 2022-10-14

## 2022-10-14 RX ORDER — DULOXETIN HYDROCHLORIDE 30 MG/1
30 CAPSULE, DELAYED RELEASE ORAL DAILY
Qty: 30 CAPSULE | Refills: 1 | Status: SHIPPED | OUTPATIENT
Start: 2022-10-14 | End: 2023-01-11

## 2022-10-14 RX ORDER — AMITRIPTYLINE HYDROCHLORIDE 10 MG/1
10 TABLET, FILM COATED ORAL NIGHTLY PRN
Qty: 30 TABLET | Refills: 2
Start: 2022-10-14 | End: 2023-05-01

## 2022-10-14 RX ORDER — CLINDAMYCIN HYDROCHLORIDE 300 MG/1
300 CAPSULE ORAL EVERY 6 HOURS
Qty: 28 CAPSULE | Refills: 0 | Status: SHIPPED | OUTPATIENT
Start: 2022-10-14 | End: 2022-10-21

## 2022-10-14 RX ADMIN — KETOROLAC TROMETHAMINE 60 MG: 30 INJECTION, SOLUTION INTRAMUSCULAR; INTRAVENOUS at 09:10

## 2022-10-14 NOTE — TELEPHONE ENCOUNTER
Spoke with patient and she voiced that she just left her PCP office and he gave her a Toradol injection in back and will see how her back feels. She has apt for 11-9-22, I voiced for her to make sure she gets labs done. Patient voiced understanding.

## 2022-10-14 NOTE — PROGRESS NOTES
Subjective:       Patient ID: Rosa Anguiano is a 42 y.o. female.    Chief Complaint: Generalized Body Aches (Patient is having body aches she thinks its from her RA and right ear pian)    HPI    F/u and new prob as below  Reports ear pain, not improved after cefdinir  C/o diffuse back pain radiating down legs  She has f/u with dr yo next mo  Was recently put on elavil for ibs by gi  Anxiety not improved on cymbalta    Review of Systems   Constitutional:  Negative for chills, diaphoresis, fatigue, fever and unexpected weight change.   HENT:  Positive for ear pain. Negative for nasal congestion, hearing loss, rhinorrhea, sinus pressure/congestion, sore throat, trouble swallowing and voice change.    Eyes:  Negative for pain and visual disturbance.   Respiratory:  Negative for cough, chest tightness, shortness of breath and wheezing.    Cardiovascular:  Negative for chest pain, palpitations and leg swelling.   Gastrointestinal:  Negative for abdominal pain, constipation, diarrhea, nausea and vomiting.   Endocrine: Negative for polydipsia and polyuria.   Genitourinary:  Negative for decreased urine volume, dysuria, flank pain, frequency, hematuria, pelvic pain, vaginal bleeding and vaginal discharge.   Musculoskeletal:  Positive for arthralgias, back pain and myalgias. Negative for neck pain.   Integumentary:  Negative for color change, pallor and rash.   Neurological:  Negative for dizziness, syncope, weakness, light-headedness and headaches.   Hematological:  Negative for adenopathy.   Psychiatric/Behavioral:  Negative for decreased concentration, dysphoric mood and sleep disturbance. The patient is not nervous/anxious.        Objective:      Physical Exam  Vitals reviewed.   Constitutional:       General: She is not in acute distress.     Appearance: She is well-developed. She is not diaphoretic.   HENT:      Head: Normocephalic and atraumatic.      Right Ear: External ear normal. Tympanic membrane is erythematous.       Left Ear: External ear normal.      Nose: Nose normal.      Mouth/Throat:      Mouth: Mucous membranes are moist.      Pharynx: Oropharynx is clear.   Eyes:      Conjunctiva/sclera: Conjunctivae normal.      Pupils: Pupils are equal, round, and reactive to light.   Neck:      Thyroid: No thyromegaly.      Vascular: No JVD.   Cardiovascular:      Rate and Rhythm: Normal rate and regular rhythm.      Pulses: Normal pulses.      Heart sounds: Normal heart sounds. No murmur heard.    No friction rub. No gallop.   Pulmonary:      Effort: Pulmonary effort is normal. No respiratory distress.      Breath sounds: Normal breath sounds. No stridor. No wheezing or rales.   Abdominal:      General: Bowel sounds are normal. There is no distension.      Palpations: Abdomen is soft.      Tenderness: There is no abdominal tenderness.   Musculoskeletal:         General: No swelling or tenderness.      Cervical back: Normal range of motion and neck supple.      Right lower leg: No edema.      Left lower leg: No edema.   Lymphadenopathy:      Cervical: No cervical adenopathy.   Skin:     General: Skin is warm and dry.      Coloration: Skin is not pale.      Findings: No erythema or rash.   Neurological:      Mental Status: She is alert and oriented to person, place, and time.   Psychiatric:         Mood and Affect: Mood normal.         Behavior: Behavior normal.       Assessment:       Problem List Items Addressed This Visit    None  Visit Diagnoses       Generalized anxiety disorder    -  Primary    given insttructions for titrating cymbalta    Relevant Medications    DULoxetine (CYMBALTA) 30 MG capsule    Irritable bowel syndrome, unspecified type        Relevant Medications    amitriptyline (ELAVIL) 10 MG tablet    Otitis media, unspecified laterality, unspecified otitis media type        Relevant Medications    clindamycin (CLEOCIN) 300 MG capsule    Polyarthralgia        Relevant Medications    ketorolac injection 60 mg  (Completed)              Plan:       Rosa was seen today for generalized body aches.    Diagnoses and all orders for this visit:    Generalized anxiety disorder  Comments:  given insttructions for titrating cymbalta  Orders:  -     DULoxetine (CYMBALTA) 30 MG capsule; Take 1 capsule (30 mg total) by mouth once daily.    Irritable bowel syndrome, unspecified type  -     amitriptyline (ELAVIL) 10 MG tablet; Take 1 tablet (10 mg total) by mouth nightly as needed for Insomnia.    Otitis media, unspecified laterality, unspecified otitis media type  -     clindamycin (CLEOCIN) 300 MG capsule; Take 1 capsule (300 mg total) by mouth every 6 (six) hours. for 7 days    Polyarthralgia  -     ketorolac injection 60 mg

## 2022-10-18 ENCOUNTER — PATIENT MESSAGE (OUTPATIENT)
Dept: FAMILY MEDICINE | Facility: CLINIC | Age: 42
End: 2022-10-18
Payer: COMMERCIAL

## 2022-10-18 RX ORDER — PREDNISONE 20 MG/1
20 TABLET ORAL DAILY
Qty: 5 TABLET | Refills: 0 | Status: SHIPPED | OUTPATIENT
Start: 2022-10-18 | End: 2022-10-23

## 2022-10-26 NOTE — PROGRESS NOTES
Subjective:      Patient ID: Rosa Anguiano is a 42 y.o. female.    Chief Complaint: Disease Management    HPI:   Includes joint pain without subjective swelling.   Antecedent event includes: None;  Pain location includes: hands, neck, and back;  Gradual onset; beginning >years ago;  Constant ache, Moderate in severity,   Lasting >hours, Worse at all times;   Improved with none;   Worsened with activity, overuse, stress, and rest;         Review of Systems   Constitutional:  Negative for chills, fatigue and fever.   HENT:  Positive for mouth dryness. Negative for nasal congestion, ear pain, hearing loss, mouth sores, nosebleeds and trouble swallowing.    Eyes:  Negative for photophobia, pain, redness, visual disturbance and eye dryness.   Respiratory:  Negative for cough and shortness of breath.    Cardiovascular:  Positive for leg swelling. Negative for chest pain and palpitations.   Gastrointestinal:  Negative for abdominal distention, abdominal pain, blood in stool, constipation, diarrhea, rectal pain, vomiting and fecal incontinence.   Endocrine: Negative for polydipsia and polyuria.   Genitourinary:  Negative for bladder incontinence, dysuria, enuresis, genital sores and hematuria.   Musculoskeletal:  Positive for arthralgias and myalgias. Negative for joint swelling.   Integumentary:  Negative for rash, wound and mole/lesion.   Neurological:  Positive for headaches. Negative for weakness and numbness.   Hematological:  Negative for adenopathy. Does not bruise/bleed easily.   Psychiatric/Behavioral:  Positive for sleep disturbance. Negative for dysphoric mood. The patient is not nervous/anxious.     Past Medical History:   Diagnosis Date    Acute pelvic pain, female     Breast pain     Cystitis     Diabetes mellitus     Dyspareunia, female     Endometriosis     Fibromyalgia     GERD (gastroesophageal reflux disease)     IBS (irritable bowel syndrome)     Interstitial cystitis     Irregular menstrual cycle      Leukorrhea     Osteoarthritis     Ovarian cyst     Pneumonia     YAMILET (stress urinary incontinence, female)     Vaginal yeast infection     Vulvitis      Past Surgical History:   Procedure Laterality Date     SECTION      CHOLECYSTECTOMY      LAPAROSCOPY-ASSISTED VAGINAL HYSTERECTOMY      TUBAL LIGATION        See the Assessment/Plan for further characterization of the HPI, ROS, Medical, Surgical, Family, and Social Histories including Tobacco use, Meds; all of which has been reviewed in Epic.    Medication List with Changes/Refills   Current Medications    AMITRIPTYLINE (ELAVIL) 10 MG TABLET    Take 1 tablet (10 mg total) by mouth nightly as needed for Insomnia.    BLOOD-GLUCOSE METER,CONTINUOUS (DEXCOM G6 ) MISC    Use as instructed    BLOOD-GLUCOSE SENSOR (DEXCOM G6 SENSOR) MARIELENA    Use as instructed    BLOOD-GLUCOSE TRANSMITTER (DEXCOM G6 TRANSMITTER) MARIELENA    Use as instructed    BORIC ACID (PH-D) 600 MG VAGINAL SUPPOSITORY    Place 1 suppository (600 mg total) vaginally every evening. As directed    DULOXETINE (CYMBALTA) 30 MG CAPSULE    Take 1 capsule (30 mg total) by mouth once daily.    FAMOTIDINE (PEPCID) 20 MG TABLET    TAKE 1 TABLET BY MOUTH TWICE DAILY AS NEEDED FOR STOMACH ACID OR REFLUX OR HEARTBURN    FLUTICASONE PROPIONATE (FLONASE) 50 MCG/ACTUATION NASAL SPRAY    2 sprays by Each Nostril route 2 (two) times daily.    HYDROXYZINE PAMOATE (VISTARIL) 25 MG CAP        HYOSCYAMINE (ANASPAZ,LEVSIN) 0.125 MG TAB    Take 1 tablet (125 mcg total) by mouth every 4 (four) hours as needed (stomach pain).    GWHULX-BLUQM-P.BLUE-SAL-NAPHOS (URIMAR-T) 120-0.12-10.8 MG TAB    Take 1 tablet by mouth after meals as needed (pain).    METHEN-M.BLUE-S.PHOS-PHSAL-HYO (URIBEL) 118-10-40.8-36 MG CAP    Take 1 capsule by mouth 3 (three) times daily as needed (bladder pain).    MISCELLANEOUS MEDICAL SUPPLY KIT    Auralgan drops for ear pain    OXYBUTYNIN (DITROPAN XL) 15 MG TR24    Take 1 tablet (15 mg total)  "by mouth once daily.    OZEMPIC 2 MG/DOSE (8 MG/3 ML) PNIJ        PANTOPRAZOLE (PROTONIX) 40 MG TABLET        PROAIR HFA 90 MCG/ACTUATION INHALER    INL 2 PFS PO Q 4 H    VALACYCLOVIR (VALTREX) 1000 MG TABLET    Take 1 tablet (1,000 mg total) by mouth every 12 (twelve) hours. for 10 days   Changed and/or Refilled Medications    Modified Medication Previous Medication    DICLOFENAC (VOLTAREN) 50 MG EC TABLET diclofenac (VOLTAREN) 50 MG EC tablet       Take 1 tablet (50 mg total) by mouth 2 (two) times daily as needed (pain).    Take 1 tablet (50 mg total) by mouth 2 (two) times daily as needed (pain).    HYDROXYCHLOROQUINE (PLAQUENIL) 200 MG TABLET hydrOXYchloroQUINE (PLAQUENIL) 200 mg tablet       Take 1 tablet (200 mg total) by mouth once daily.    Take 1 tablet (200 mg total) by mouth once daily.    PREGABALIN (LYRICA) 150 MG CAPSULE pregabalin (LYRICA) 150 MG capsule       Take 1 capsule (150 mg total) by mouth 2 (two) times daily.    Take 1 capsule (150 mg total) by mouth 2 (two) times daily.         Objective:   /76   Pulse 72   Resp 16   Ht 4' 11" (1.499 m)   Wt 75.5 kg (166 lb 8 oz)   SpO2 99%   BMI 33.63 kg/m²   Physical Exam  Non-toxic appearance. No distress.   Normocephalic and atraumatic.   External ears normal.    Conjunctivae and EOM are normal. No scleral icterus.   RRR, No friction rub; palpable distal pulses.    No tachypnea or signs of respiratory distress.   Abdominal: No guarding or rebound tenderness.   Neurological: Oriented. Normal thought content. No cranial nerve deficit. Strength is grossly normal without focal deficit.  Skin is warm. No pallor.   Musculoskeletal: see below for further input. No overt synovitis.     [facility] UNKNOWN RAD EAP                                RADIOLOGY REPORT        PT NAME: BECKI ESCOBAR      [facility]     : 1980 F 42             [address] [facility]    ACCT: [id number]                                              " [facility] Rec #: [id number]                                        [id number]    Patient Location: AL.RADUS/             Procedure: ABDOMEN LIMITED    REQUISITION #: [id number]      REPORT #: [id number]           DATE OF EXAM: 09/30/22    TIME OF EXAM: 1130       ULTRASOUND ABDOMEN        Clinical data: Elevated transaminase levels with chronic abdominal pain        Technique:    Acquisition: Transabdominal grayscale imaging of the abdomen was performed   with multiple longitudinal and transverse images being obtained at real-  time.        Comparison: No prior relevant studies are available.        Findings:        Liver: The liver demonstrates normal echogenicity with no focal lesions are    detected. The main portal vein demonstrates normal hepatopedal flow.        Gallbladder: Cholecystectomy change.        Biliary tree: The common bile duct measures a normal 2 mm in diameter.        Pancreas: The visualized pancreas appears normal.        Kidneys: The right kidney measures 11.3 x 5.3 x 4.8 cm with no   hydronephrosis.        Impression:        1.  No acute findings in the abdomen.        DICTATING PHYSICIAN:  SPARKLE ORLANDO MD                   Date Dictated: 09/30/22 1212        Signed By:  SPARKLE ORLANDO MD <Electronically signed by SPARKLE ORLANDO MD in OV>    Date Signed:  09/30/22 1214     CC: SKYE DENNISON MD ; SKYE DENNISON MD      ADMITTING PHYSICIAN:                                                                                                    ORDERING PHY: SKYE DENNISON MD                                                                                                                                                      ATTENDING PHY: SKYE DENNISON MD    Patient Status:  REG CLI    Admit Service Date: 09/30/22         Patient Outreach on 11/09/2022   Component Date Value Ref Range Status    Left Eye DM Retinopathy 11/08/2022 Negative   Final    Right Eye DM Retinopathy  11/08/2022 Negative   Final   Patient Outreach on 10/13/2022   Component Date Value Ref Range Status    BCS Recommendation External 09/22/2022 Repeat mammogram in 1 year   Final   Office Visit on 07/26/2022   Component Date Value Ref Range Status    B12 07/27/2022 1,122  232 - 1,245 pg/mL Final    Comment: NOTE  Testing performed at:  [facility], [address] [place] [address] CLIA #:[id number]      Methylmalonic Acid 07/27/2022 108  87 - 318 nmol/L Final    Comment:   This test was developed and its analytical performance  characteristics have been determined by [facility]. It has not been  cleared or approved by FDA. This assay has been validated  pursuant to the CLIA regulations and is used for clinical  purposes.  NOTE  Testing performed at:  [facility], [address] [place] [id number] CLIA#:[id number]      WBC 07/27/2022 8.54  4.3 - 10.8 X 10 3/ul Final    RBC 07/27/2022 4.36  4.2 - 5.4 X 10 6/ul Final    RDW-SD 07/27/2022 42.4  37 - 54 fl Final    Hemoglobin 07/27/2022 13.3  12 - 16 g/dL Final    Hematocrit 07/27/2022 39.2  37 - 47 % Final    MCV 07/27/2022 89.9  82 - 100 fl Final    MCH 07/27/2022 30.5  27 - 32 pg Final    MCHC 07/27/2022 33.9  32 - 36 g/dL Final    Platelets 07/27/2022 292  135 - 400 X 10 3/ul Final    Neutrophils 07/27/2022 53.1  34 - 71.1 % Final    Lymphocytes 07/27/2022 38.6  19.3 - 53.1 % Final    Monocytes 07/27/2022 7.0  4.7 - 12.5 % Final    Eosinophils 07/27/2022 0.5 (L)  0.7 - 7.0 % Final    Basophils 07/27/2022 0.4  0.2 - 1.2 % Final    Neutrophils Absolute 07/27/2022 4.54  2.15 - 7.56 X 10 3/ul Final    Lymphocytes Absolute 07/27/2022 3.30  0.86 - 4.75 X 10 3/ul Final    Monocytes Absolute 07/27/2022 0.60  0.22 - 1.08 X 10 3/ul Final    Eosinophils Absolute 07/27/2022 0.04  0.04 - 0.54 X 10 3/ul Final    Basophils Absolute 07/27/2022 0.03  0.00 - 0.22 X 10 3/ul Final    Immature  Granulocytes Absolute 07/27/2022 0.03  0 - 0.04 X 10 3/ul Final    Immature Granulocytes 07/27/2022 0.4  0 - 0.5 % Final    IG includes metamyelocytes, myelocytes, and promyelocytes    nRBC# 07/27/2022 0.0  0 - 0.2 /100 WBC Final    nRBC Count Absolute 07/27/2022 0.000  0 - 0.012 x 10 3/ul Final    Comment: NOTE  Testing performed at:  [facility], [address] [place] [address]  [id number] CLIA #:[id number]      Additional Testing 07/27/2022 SEE BELOW   Final    Comment: Additional testing was collected and sent to a reference lab. Results  may take 7 days to 2 weeks before they are final. Reports will be sent  to ordering client via fax, pathviewer, mail, interface or   delivered.  NOTE  Testing performed at:  [facility], [address] [place] [address]  [id number] CLIA #:[id number]     Patient Outreach on 07/18/2022   Component Date Value Ref Range Status    Cholesterol 07/14/2022 216 (H)  100 - 200 mg/dL Final    Triglycerides 07/14/2022 141  0 - 150 mg/dL Final    HDL 07/14/2022 50 (L)  >60 Final    LDL Cholesterol 07/14/2022 138 (H)  0 - 100 mg/dL Final    LDL/HDL Ratio 07/14/2022 2.8  1.0 - 3.0 Final    Hemoglobin A1C 06/01/2022 6.6 (H)  4.0 - 6.0 % Final    Glucose 06/01/2022 103  74 - 106 mg/dL Final    BUN 06/01/2022 9.9  6 - 20 mg/dL Final    Creatinine 06/01/2022 0.5  0.5 - 0.9 mg/dL Final    AST 06/01/2022 16  0 - 32 U/L Final    ALT 06/01/2022 28  0 - 33 U/L Final    Alkaline Phosphatase 06/01/2022 91  35 - 105 U/L Final    Calcium 06/01/2022 9.1  8.6 - 10.2 mg/dL Final    Total Protein 06/01/2022 7.6  6.4 - 8.3 g/dL Final    Albumin 06/01/2022 4.4  3.5 - 5.2 g/dL Final    Total Bilirubin 06/01/2022 0.4  0.0 - 1.2 mg/dL Final    Sodium 06/01/2022 138  136 - 145 mmol/L Final    Potassium 06/01/2022 3.8  3.5 - 5.1 mmol/L Final    Chloride 06/01/2022 104  98 - 107 mmol/L Final    CO2 06/01/2022 25  22 - 29 mmol/L Final    Globulin 06/01/2022 3.2  1.5 - 4.5 Final     Albumin/Globulin Ratio 06/01/2022 1.4  1.0 - 2.7 Final    BUN/CREAT RATIO 06/01/2022 18.7  6 - 20 Final    GFR ESTIMATION 06/01/2022 126.51  >60.00 Final    Anion Gap 06/01/2022 9  8 - 17 mmol/L Final   Office Visit on 07/18/2022   Component Date Value Ref Range Status    Color, UA 07/18/2022 Green/Blue   Final    pH, UA 07/18/2022 5.5   Final    WBC, UA 07/18/2022 Negative   Final    Nitrite, UA 07/18/2022 Negative   Final    Protein, POC 07/18/2022 Negative   Final    Glucose, UA 07/18/2022 Negative   Final    Ketones, UA 07/18/2022 Negative   Final    Urobilinogen, UA 07/18/2022 0.2   Final    Bilirubin, POC 07/18/2022 Negative   Final    Blood, UA 07/18/2022 Trace-Intact   Final    Clarity, UA 07/18/2022 Clear   Final    Spec Grav UA 07/18/2022 1.030   Final    Urine Culture, Routine 07/18/2022    Final    Comment: Source: URINE  Site:  Organism #1                    MIXED ORGANISMS, NO SUSCEPTIBILITY  Quantity                      25,000    Mixed ilene in a urine culture means there are several different types  of bacteria present, which is usually indicative of contamination of  the urine.  NOTE  Testing performed at:  [facility], [address] [place] [address] CLIA #:[id number]     Orders Only on 06/27/2022   Component Date Value Ref Range Status    POC Glucose 06/27/2022 113 (H)  70 - 105 Final   Orders Only on 06/24/2022   Component Date Value Ref Range Status    Specimen Collection Method 06/24/2022 Cl Catch Mid Stream   Final    Color, UA 06/24/2022 Christine (A)  Yellow Final    Appearance, UA 06/24/2022 Clear  Clear Final    pH, UA 06/24/2022 5.0  5.0 - 9.0 pH Final    Specific Gravity, UA 06/24/2022 1.020  1.000 - 1.030 Final    Protein, UA 06/24/2022 Negative  Negative mg/dL Final    Glucose, UA 06/24/2022 Normal  Normal mg/dL Final    Ketones, UA 06/24/2022 Negative  Negative mg/dL Final    Occult Blood UA 06/24/2022 10 (A)  Negative /uL Final    Nitrite, UA 06/24/2022 Negative   Negative Final    Bilirubin (UA) 06/24/2022 Negative  Negative mg/dL Final    Urobilinogen, UA 06/24/2022 Normal  Normal mg/dL Final    Leukocytes, UA 06/24/2022 Negative  Negative /uL Final    RBC, UA 06/24/2022 Rare  0 - 2 /HPF Final    WBC, UA 06/24/2022 Rare  0 - 5 /HPF Final    Squam Epithel, UA 06/24/2022 2+ Moderate  /LPF Final    Ca Oxalate Swetha, UA 06/24/2022 2+ Moderate (A)  None Seen /LPF Final    Bacteria 06/24/2022 1+ Few  Few/HPF /HPF Final    Service Comment 03 06/24/2022 No, Criteria Not Met   Final   Orders Only on 06/24/2022   Component Date Value Ref Range Status    Sodium 06/24/2022 139  135 - 145 mmol/L Final    Potassium 06/24/2022 3.9  3.6 - 5.2 mmol/L Final    Chloride 06/24/2022 105  100 - 108 mmol/L Final    CO2 06/24/2022 27  21 - 32 mmol/L Final    Anion Gap 06/24/2022 7.0  3.0 - 11.0 mmol/L Final    BUN 06/24/2022 10  7 - 18 mg/dL Final    Creatinine 06/24/2022 0.55  0.55 - 1.02 mg/dL Final    GFR ESTIMATION 06/24/2022 > 60  >60 Final    If patient is , multiply result by 1.212.           DESCRIPTION       GLOMERULAR FILTRATION RATE             NORMAL                     >60             KIDNEY  DISEASE           15-60             KIDNEY FAILURE             <15    BUN/Creatinine Ratio 06/24/2022 18.18 (H)  7 - 18 Ratio Final    Glucose 06/24/2022 158 (H)  70 - 110 mg/dL Final    Comment: Level 2 hypoglycemia (blood glucose 40-53) has beenassociated with impaired cognitive function, reducedhypoglycemia awareness following repeated episodes,progression to severe hypoclycemia, cardiac arrhythmias, andfuture mortality.Limiting the critical   value for blood glucose to <40 mg/dLmeans overlooking patients with level 2 hypoglycemia (bloodglucose 40-53 mg/dL) who may experience severe hypoglycemia.The new adult critical value for blood glucose approved [facility] is <54 mg/dL.      Calcium 06/24/2022 8.7 (L)  8.8 - 10.5 mg/dL Final   Orders Only on 06/24/2022   Component Date Value  Ref Range Status    WBC 06/24/2022 7.8  4.6 - 10.2 10*3/uL Final    RBC 06/24/2022 4.23  4.2 - 5.4 10*6/uL Final    Hemoglobin 06/24/2022 12.7  12.0 - 16.0 g/dL Final    Hematocrit 06/24/2022 37.6  37.0 - 47.0 % Final    MCV 06/24/2022 88.9  80 - 97 fL Final    MCH 06/24/2022 30.0  27.0 - 31.2 pg Final    MCHC 06/24/2022 33.8  31.8 - 35.4 g/dL Final    RDW RBC Auto-Rto 06/24/2022 13.2  12.5 - 18.0 % Final    Platelets 06/24/2022 296  142 - 424 10*3/uL Final    Neutrophils 06/24/2022 60.0  37 - 80 % Final    Neutrophils Absolute 06/24/2022 4.8  1.8 - 7.7 10*3/uL Final    Bands 06/24/2022 2.0  0 - 5 % Final    Lymphocytes 06/24/2022 32.0  25 - 40 % Final    Monocytes 06/24/2022 6.0  1 - 15 % Final    Cells Counted 06/24/2022 100   Final    Platelet Estimate 06/24/2022 Adequate   Final    RBC Morphology 06/24/2022 N/N   Final   Office Visit on 04/26/2022   Component Date Value Ref Range Status    Trichomonas DNA 04/26/2022 NOT DETECTED  NOT DETECTED Final    Candida Group DNA 04/26/2022 NOT DETECTED  NOT DETECTED Final    Comment: Candida group includes albicans, tropicalis, parapsilosis and  dubliniensis.      Jamee glabrata DNA 04/26/2022 NOT DETECTED  NOT DETECTED Final    Jamee krusei DNA 04/26/2022 NOT DETECTED  NOT DETECTED Final    Vaginosis Panel DNA 04/26/2022 NOT DETECTED  NOT DETECTED Final    Comment: Vaginosis panel DNA Not Detected: Detection of marker combinations  related to normal vaginal ilene.  NOTE  Testing performed at:  [facility], [address] [place] [address]  [address] CLIA #:[id number]     There may be more visits with results that are not included.        All of the data above and below has been reviewed by myself and any further interpretations will be reflected in the assessment and plan.   The data includes review of external notes, and independent interpretation of lab results, x-rays, and imaging reports.  Active inflammatory arthritis is an illness that poses a threat to  bodily function and even a threat to life in some cases.  Drug therapy to treat inflammatory arthritis usually requires intensive monitoring for toxicity.    Review of patient's allergies indicates:  No Known Allergies  Assessment/Plan:       Prev noted/prior plan:  (No overt synovitis     Verbal education     Likely mechanical and myofascial + neurogenic pain;  No overt active inflammatory arthritis but she had been on treatment MTX per patient     Blood work to further evaluate     We will seek records from Dr. Wright and records from [facility]     Resume voltaren))     Last visit:     Interim lab:     HepBcoreAb neg     Creat 0.79; alb 4.9     AST 49  ALT 90       WBC 9.9; Hgb 14.1; plt 340     UA SG 1.02 moderate epi cells     C3 220  C4 29     SPEP beta little high alpha2 little high     CPK 52           JACOB neg RF 17.9+ CCP neg HepBsAb+sAgneg coreAb neg HCV neg uric acid 5.4     2022 TB negative     ESR 24; CRP 15.2;         Interim records from Dr. Wright some of note 5/26/22: history fibromyalgia and DJD; Opiates help with qol; no active synovitis; 14/18 trigger points; A/P: fibromyalgia and DJD; continue lyrica 150mg; RF+ ACR90 with Methotrexate; Gout is a form of arthritis; Lab; revisit 3 months (I do see Methotrexate 2.5mg on a med list but nothing mentioned in the note other than what I wrote and gout also mentioned with patient education? Also was given depomedrol at some point? Cymbalta and lyrica noted; I don't see what opioid she was prescribed?)        Interim records from [facility]: some of note 7/19/22  left elbow pain f/u; prior injection helped 2 months; will repeat left lateral epicondyle injection; revisit 6 months     8/3/22 MRI cervical: multilevel DJD DDD worse C5-6 C6-7        Prior plan/prev noted:   Lyrica 150mg bid   Prior voltaren 50mg we will resume   Prior skelaxin  Resume  voltaren  Addendum 8/10/22 2:50pm: she called stating the lyrica no longer works for her so she is maybe going to stop it. Noted)           Went over lab including RF and LFTs     Went over records     She only used the MTX briefly with Dr. Wright less than a month and she tells me he stopped it     She did stop the lyrica completely interim and then resumed it recently again because it was helping with some pain in face her PCP told her was from shingles     She may be using the diclofenac?     She has some interim symptoms of inflammatory arthritis     No overt synovitis     Verbal education and some written     Add plaquenil 200mg daily with referral to Ophthalmology     liver US to further evaluate the LFTs     Cont/resume diclofenac 50mg bid prn     Cont lyrica 150mg bid     Cymbalta 20mg daily noted     Call if not improving or question     At the end of visit she asks about if she also stop Methotrexate? which I understood she was no longer using? and she clarified rather not using but not sure and telling me she needs more sleep noted     Revisit lab 2 weeks prior     Contact us prn      This visit:    Interim lab 10/28/22:    CPK 87    AST 26 (prior 49)  ALT 45 (prior 90)    Creat 0.62; alb 4.2    WBC 6.8; Hgb 13.2; plt 298    UA SG 1.015 few cells    RF18.4+    ESR 17; CRP 3.8    Interim liver US 9/30/22:   Liver: The liver demonstrates normal echogenicity with no focal lesions are    detected. The main portal vein demonstrates normal hepatopedal flow.      Gallbladder: Cholecystectomy change.     Biliary tree: The common bile duct measures a normal 2 mm in diameter.     Pancreas: The visualized pancreas appears normal.    Kidneys: The right kidney measures 11.3 x 5.3 x 4.8 cm with no   hydronephrosis.    Impression:    1.  No acute findings in the abdomen.  ))        Prev noted/prior plan:  (Prior plan/prev noted:   Lyrica 150mg bid   Prior voltaren 50mg we will resume   Prior skelaxin  Resume  voltaren  Addendum 8/10/22 2:50pm: she called stating the lyrica no longer works for her so she is maybe going to stop it. Noted)  Went over lab including RF and LFTs  Went over records  She only used the MTX briefly with Dr. Wright less than a month and she tells me he stopped it  She did stop the lyrica completely interim and then resumed it recently again because it was helping with some pain in face her PCP told her was from shingles  She may be using the diclofenac?  She has some interim symptoms of inflammatory arthritis  No overt synovitis  Verbal education and some written  Add plaquenil 200mg daily with referral to Ophthalmology  liver US to further evaluate the LFTs  Cont/resume diclofenac 50mg bid prn  Cont lyrica 150mg bid  Cymbalta 20mg daily noted  Call if not improving or question   At the end of visit she asks about if she also stop Methotrexate? which I understood she was no longer using? and she clarified rather not using but not sure and telling me she needs more sleep noted)      She has interim pain in hands cervical lumbar and elbow    Looks like she used prednisone 20mg daily for 5 days weeks ago from PCP for interim back pain noted; She should also contact us if symptoms of inflammatory arthritis in future    Looks like cymbalta increased 30mg interim noted    She tells me she is not sure about her meds noted; she could bring a list but I think we have updated things?    She can also call us moving forward; Jill has a direct number and can also contact me through the portal;     No overt synovitis but some interim prednisone noted; she tells me she had a lot of joint swelling prior to the prednisone noted    Verbal education  Went over lab    xrays to further evaluate at her convenience    She should call is recurs/persists  As per above    Will also keep close eye on her given interim reported history    Cont/refill meds:  Plaquenil 200mg daily  Diclofenac 50mg bid prn  Lyrica 150mg  bid    Cymbalta 30mg noted    Revisit lab 2 weeks prior    Contact us prn      JACOB neg RF 17.9+ CCP neg HepBsAb+sAgneg coreAb neg HCV neg uric acid 5.4  There is some history to suggest inflammatory arthritis.     Rheumatoid arthritis and fibromyalgia per records    2022 TB negative     previously followed by Dr. Wright      She tells me Dr. Wright would not refill her diclofenac for unknown reason; no known kidney problem prev noted     She sounds like she was also started on MTX 4/week with folic acid that did not help so she stopped it but then she was back and forth about this previously noted     records from Dr. Wright some of note 5/26/22: history fibromyalgia and DJD; Opiates help with qol; no active synovitis; 14/18 trigger points; A/P: fibromyalgia and DJD; continue lyrica 150mg; RF+ ACR90 with Methotrexate; Gout is a form of arthritis; Lab; revisit 3 months   (I do see Methotrexate 2.5mg on a med list but nothing mentioned in the note other than what I wrote and gout also mentioned with patient education? Also was given depomedrol at some point? Cymbalta and lyrica noted; I don't see what opioid she was prescribed?)     [place] [facility] [place] [facility] [place] of [place] 7/19/22  left elbow pain f/u; prior injection helped 2 months; will repeat left lateral epicondyle injection; revisit 6 months     8/3/22 MRI cervical: multilevel DJD DDD worse C5-6 C6-7      There is also some mechanical and neurogenic pain.     She has chronic numbness in head that she was told was from shingles on 4 different occasions and had treatment but never had a rash or skin lesion noted     Chronic neck and back pain     She tells me she is also working with Physical Medicine and Rehab MD if not already     Prior CHERI that did not help     Also had [place] [facility] [place] per patient     Prev noted/prior plan:  (No overt synovitis   Verbal education   Likely mechanical  and myofascial + neurogenic pain;  No overt active inflammatory arthritis but she had been on treatment MTX per patient   Blood work to further evaluate   We will seek records from Dr. Wright and records [place] [facility] [place] [facility] [place]ren))      Has been managing with:     Lyrica 150mg bid     Prior voltaren 50mg we will resume     Prior skelaxin     Prior Methotrexate     Prior steroids oral and injections     Has been working with:     Records Tameka PHILIP some of note 7/27/22:  ((Follow-up (Patient here with c/o back pain, generalized body aches, and skin pain (burning sensation.//1. Back pain-awaiting authorization for MRI's/2. Generalized body aches-patient currently seeing Dr. Wright, but is just being given pain medications instead of a treatment plan per patient. She is seeing Cristobal in August to establish care./3. Skin pain-has been dx with shingles in the past with no lesions/rash. Verbalizes the pain is the same.), Back Pain, Generalized Body Aches, and burning sensation to skin     HPI   Patient is here today for follow-up and new complaints.     Patient reports burning sensation and stinging sensation over her skin.  Reports she has a history of fibromyalgia and RA.  She was seen by Dr. Wright in the past but has an upcoming appointment with Dr. Jain in August.  Patient currently takes Lyrica.     Neck and back pain-patient reports chronic pain and muscle tension in her neck and back.  She is awaiting approval for 2 are eyes.  She currently takes Mobic daily without relief.     Pt has completed 8 weeks of physical therapy for her neck and back without relief.       Assessment & Plan:      Neuropathic pain  -     Vitamin B12; Future; Expected date: 07/26/2022  -     Methylmalonic Acid, Serum; Future; Expected date: 07/26/2022  -     CBC Auto Differential; Future; Expected date: 07/26/2022     Rheumatoid arthritis involving multiple sites with positive  rheumatoid factor  Comments:  Keep follow-up with Rheumatology     Neck pain  -     metaxalone (SKELAXIN) 800 MG tablet; Take 1 tablet (800 mg total) by mouth 3 (three) times daily. for 10 days  Dispense: 30 tablet; Refill: 0  -     diclofenac (VOLTAREN) 50 MG EC tablet; 1 tab 2-3 times a day with a meal  Dispense: 90 tablet; Refill: 1     Back pain, unspecified back location, unspecified back pain laterality, unspecified chronicity  -     metaxalone (SKELAXIN) 800 MG tablet; Take 1 tablet (800 mg total) by mouth 3 (three) times daily. for 10 days  Dispense: 30 tablet; Refill: 0  -     diclofenac (VOLTAREN) 50 MG EC tablet; 1 tab 2-3 times a day with a meal  Dispense: 90 tablet; Refill: 1))     Review of medical records as stated above.  Lab +/- imaging and other ordered diagnostic studies to further evaluate.  See the orders associated with this note visit.  Medications as prescribed as tolerated.    Education including verbal and those noted in the patient instructions.  Revisit as scheduled and call prn.    The following diagnoses influence medical decision making and/or need further workup including the orders listed below:    Rheumatoid arthritis involving multiple sites with positive rheumatoid factor  -     CBC Auto Differential; Future; Expected date: 12/26/2022  -     CK; Future; Expected date: 12/26/2022  -     Comprehensive Metabolic Panel; Future; Expected date: 12/26/2022  -     C-Reactive Protein; Future; Expected date: 12/26/2022  -     Sedimentation rate; Future; Expected date: 12/26/2022  -     Urinalysis; Future; Expected date: 12/26/2022  -     hydrOXYchloroQUINE (PLAQUENIL) 200 mg tablet; Take 1 tablet (200 mg total) by mouth once daily.  Dispense: 30 tablet; Refill: 2  -     diclofenac (VOLTAREN) 50 MG EC tablet; Take 1 tablet (50 mg total) by mouth 2 (two) times daily as needed (pain).  Dispense: 60 tablet; Refill: 2  -     X-Ray Hand 3 View Bilateral; Future; Expected date: 11/09/2022  -      X-Ray Hips Bilateral 2 View Inc AP Pelvis; Future; Expected date: 11/09/2022  -     X-Ray Chest PA And Lateral; Future; Expected date: 11/09/2022  -     X-Ray Cervical Spine AP And Lateral; Future; Expected date: 11/09/2022  -     X-ray AP Standing Knees with Both Latera; Future; Expected date: 11/09/2022  -     X-Ray Foot 2 View Bilateral; Future; Expected date: 11/09/2022  -     X-Ray Lumbar Spine AP And Lateral; Future; Expected date: 11/09/2022    Cervical spondylosis  -     CBC Auto Differential; Future; Expected date: 12/26/2022  -     CK; Future; Expected date: 12/26/2022  -     Comprehensive Metabolic Panel; Future; Expected date: 12/26/2022  -     C-Reactive Protein; Future; Expected date: 12/26/2022  -     Sedimentation rate; Future; Expected date: 12/26/2022  -     Urinalysis; Future; Expected date: 12/26/2022  -     diclofenac (VOLTAREN) 50 MG EC tablet; Take 1 tablet (50 mg total) by mouth 2 (two) times daily as needed (pain).  Dispense: 60 tablet; Refill: 2  -     X-Ray Cervical Spine AP And Lateral; Future; Expected date: 11/09/2022    Myalgia  -     CBC Auto Differential; Future; Expected date: 12/26/2022  -     CK; Future; Expected date: 12/26/2022  -     Comprehensive Metabolic Panel; Future; Expected date: 12/26/2022  -     C-Reactive Protein; Future; Expected date: 12/26/2022  -     Sedimentation rate; Future; Expected date: 12/26/2022  -     Urinalysis; Future; Expected date: 12/26/2022  -     diclofenac (VOLTAREN) 50 MG EC tablet; Take 1 tablet (50 mg total) by mouth 2 (two) times daily as needed (pain).  Dispense: 60 tablet; Refill: 2  -     pregabalin (LYRICA) 150 MG capsule; Take 1 capsule (150 mg total) by mouth 2 (two) times daily.  Dispense: 60 capsule; Refill: 2  -     X-Ray Hand 3 View Bilateral; Future; Expected date: 11/09/2022  -     X-Ray Hips Bilateral 2 View Inc AP Pelvis; Future; Expected date: 11/09/2022  -     X-Ray Chest PA And Lateral; Future; Expected date: 11/09/2022  -      X-Ray Cervical Spine AP And Lateral; Future; Expected date: 11/09/2022  -     X-ray AP Standing Knees with Both Latera; Future; Expected date: 11/09/2022  -     X-Ray Foot 2 View Bilateral; Future; Expected date: 11/09/2022  -     X-Ray Lumbar Spine AP And Lateral; Future; Expected date: 11/09/2022    Polyarthralgia  -     X-Ray Hand 3 View Bilateral; Future; Expected date: 11/09/2022  -     X-Ray Hips Bilateral 2 View Inc AP Pelvis; Future; Expected date: 11/09/2022  -     X-Ray Chest PA And Lateral; Future; Expected date: 11/09/2022  -     X-Ray Cervical Spine AP And Lateral; Future; Expected date: 11/09/2022  -     X-ray AP Standing Knees with Both Latera; Future; Expected date: 11/09/2022  -     X-Ray Foot 2 View Bilateral; Future; Expected date: 11/09/2022  -     X-Ray Lumbar Spine AP And Lateral; Future; Expected date: 11/09/2022    Neuralgia  -     CBC Auto Differential; Future; Expected date: 12/26/2022  -     CK; Future; Expected date: 12/26/2022  -     Comprehensive Metabolic Panel; Future; Expected date: 12/26/2022  -     C-Reactive Protein; Future; Expected date: 12/26/2022  -     Sedimentation rate; Future; Expected date: 12/26/2022  -     Urinalysis; Future; Expected date: 12/26/2022  -     pregabalin (LYRICA) 150 MG capsule; Take 1 capsule (150 mg total) by mouth 2 (two) times daily.  Dispense: 60 capsule; Refill: 2    Elevated liver transaminase level  -     CBC Auto Differential; Future; Expected date: 12/26/2022  -     CK; Future; Expected date: 12/26/2022  -     Comprehensive Metabolic Panel; Future; Expected date: 12/26/2022  -     C-Reactive Protein; Future; Expected date: 12/26/2022  -     Sedimentation rate; Future; Expected date: 12/26/2022  -     Urinalysis; Future; Expected date: 12/26/2022    Screening for rheumatic disorder  -     CBC Auto Differential; Future; Expected date: 12/26/2022  -     CK; Future; Expected date: 12/26/2022  -     Comprehensive Metabolic Panel; Future; Expected  date: 12/26/2022  -     C-Reactive Protein; Future; Expected date: 12/26/2022  -     Sedimentation rate; Future; Expected date: 12/26/2022  -     Urinalysis; Future; Expected date: 12/26/2022  -     X-Ray Hand 3 View Bilateral; Future; Expected date: 11/09/2022  -     X-Ray Hips Bilateral 2 View Inc AP Pelvis; Future; Expected date: 11/09/2022  -     X-Ray Chest PA And Lateral; Future; Expected date: 11/09/2022  -     X-Ray Cervical Spine AP And Lateral; Future; Expected date: 11/09/2022  -     X-ray AP Standing Knees with Both Latera; Future; Expected date: 11/09/2022  -     X-Ray Foot 2 View Bilateral; Future; Expected date: 11/09/2022  -     X-Ray Lumbar Spine AP And Lateral; Future; Expected date: 11/09/2022    Medication monitoring encounter  -     CBC Auto Differential; Future; Expected date: 12/26/2022  -     CK; Future; Expected date: 12/26/2022  -     Comprehensive Metabolic Panel; Future; Expected date: 12/26/2022  -     C-Reactive Protein; Future; Expected date: 12/26/2022  -     Sedimentation rate; Future; Expected date: 12/26/2022  -     Urinalysis; Future; Expected date: 12/26/2022    Follow up in about 2 months (around 1/9/2023).     David Jain MD

## 2022-10-28 LAB
ABS NRBC COUNT: 0 X 10 3/UL (ref 0–0.01)
ABSOLUTE BASOPHIL: 0.03 X 10 3/UL (ref 0–0.22)
ABSOLUTE EOSINOPHIL: 0.05 X 10 3/UL (ref 0.04–0.54)
ABSOLUTE IMMATURE GRAN: 0.02 X 10 3/UL (ref 0–0.04)
ABSOLUTE LYMPHOCYTE: 3.27 X 10 3/UL (ref 0.86–4.75)
ABSOLUTE MONOCYTE: 0.47 X 10 3/UL (ref 0.22–1.08)
ALBUMIN SERPL-MCNC: 4.2 G/DL (ref 3.5–5.2)
ALBUMIN/GLOB SERPL ELPH: 1.4 {RATIO} (ref 1–2.7)
ALP ISOS SERPL LEV INH-CCNC: 91 U/L (ref 35–105)
ALT (SGPT): 45 U/L (ref 0–33)
AMORPH URATE CRY URNS QL MICRO: NEGATIVE
ANION GAP SERPL CALC-SCNC: 11 MMOL/L (ref 8–17)
AST SERPL-CCNC: 26 U/L (ref 0–32)
BACTERIA #/AREA URNS HPF: NORMAL /[HPF]
BASOPHILS NFR BLD: 0.4 % (ref 0.2–1.2)
BILIRUB UR QL STRIP: NEGATIVE
BILIRUBIN, TOTAL: 0.27 MG/DL (ref 0–1.2)
BUN/CREAT SERPL: 15.6 (ref 6–20)
CALCIUM SERPL-MCNC: 9.4 MG/DL (ref 8.6–10.2)
CARBON DIOXIDE, CO2: 26 MMOL/L (ref 22–29)
CHLORIDE: 102 MMOL/L (ref 98–107)
CK SERPL-CCNC: 67 U/L (ref 26–192)
CLARITY UR: NORMAL
COLOR UR: NORMAL
CREAT SERPL-MCNC: 0.62 MG/DL (ref 0.5–0.9)
CRP QUALITATIVE: NEGATIVE MG/L
CRP QUANTITATIVE: 3.8 MG/L
EOSINOPHIL NFR BLD: 0.7 % (ref 0.7–7)
EPITHELIAL CELLS: NORMAL
GFR ESTIMATION: 106.02
GLOBULIN: 3.1 G/DL (ref 1.5–4.5)
GLUCOSE (UA): NEGATIVE MG/DL
GLUCOSE: 96 MG/DL (ref 74–106)
HCT VFR BLD AUTO: 38.8 % (ref 37–47)
HGB BLD-MCNC: 13.2 G/DL (ref 12–16)
IMMATURE GRANULOCYTES: 0.3 % (ref 0–0.5)
KETONES UR QL STRIP: NEGATIVE MG/DL
LEUKOCYTE ESTERASE UR QL STRIP: NEGATIVE
LYMPHOCYTES NFR BLD: 48.2 % (ref 19.3–53.1)
MCH RBC QN AUTO: 30.7 PG (ref 27–32)
MCHC RBC AUTO-ENTMCNC: 34 G/DL (ref 32–36)
MCV RBC AUTO: 90.2 FL (ref 82–100)
MONOCYTES NFR BLD: 6.9 % (ref 4.7–12.5)
MUCOUS THREADS URNS QL MICRO: NEGATIVE
NEUTROPHILS # BLD AUTO: 2.95 X 10 3/UL (ref 2.15–7.56)
NEUTROPHILS NFR BLD: 43.5 % (ref 34–71.1)
NITRITE UR QL STRIP: NEGATIVE
NUCLEATED RED BLOOD CELLS: 0 /100 WBC (ref 0–0.2)
OCCULT BLOOD: NEGATIVE
PH, URINE: 5 (ref 5–7.5)
PLATELET # BLD AUTO: 298 X 10 3/UL (ref 135–400)
POTASSIUM: 4 MMOL/L (ref 3.5–5.1)
PROT SNV-MCNC: 7.3 G/DL (ref 6.4–8.3)
PROT UR QL STRIP: NEGATIVE MG/DL
RBC # BLD AUTO: 4.3 X 10 6/UL (ref 4.2–5.4)
RBC/HPF: NORMAL
RDW-SD: 40.1 FL (ref 37–54)
RHEUMATOID ARTHRITIS FACTOR: POSITIVE IU/ML
RHEUMATOID ARTHRITIS, QN/FLUID: 18.4 IU/ML
SED RATE (WESTERGREN): 17 MM/HR (ref 0–20)
SODIUM: 139 MMOL/L (ref 136–145)
SP GR UR STRIP: 1.01 (ref 1–1.03)
UREA NITROGEN (BUN): 9.7 MG/DL (ref 6–20)
UROBILINOGEN, URINE: NORMAL E.U./DL (ref 0–1)
WBC # BLD: 6.79 X 10 3/UL (ref 4.3–10.8)
WBC/HPF: NORMAL

## 2022-11-08 LAB
LEFT EYE DM RETINOPATHY: NEGATIVE
RIGHT EYE DM RETINOPATHY: NEGATIVE

## 2022-11-09 ENCOUNTER — OFFICE VISIT (OUTPATIENT)
Dept: RHEUMATOLOGY | Facility: CLINIC | Age: 42
End: 2022-11-09
Payer: COMMERCIAL

## 2022-11-09 ENCOUNTER — PATIENT OUTREACH (OUTPATIENT)
Dept: ADMINISTRATIVE | Facility: HOSPITAL | Age: 42
End: 2022-11-09
Payer: COMMERCIAL

## 2022-11-09 VITALS
BODY MASS INDEX: 33.56 KG/M2 | DIASTOLIC BLOOD PRESSURE: 76 MMHG | HEIGHT: 59 IN | SYSTOLIC BLOOD PRESSURE: 111 MMHG | RESPIRATION RATE: 16 BRPM | WEIGHT: 166.5 LBS | OXYGEN SATURATION: 99 % | HEART RATE: 72 BPM

## 2022-11-09 DIAGNOSIS — M47.812 CERVICAL SPONDYLOSIS: ICD-10-CM

## 2022-11-09 DIAGNOSIS — Z51.81 MEDICATION MONITORING ENCOUNTER: ICD-10-CM

## 2022-11-09 DIAGNOSIS — M06.9 RHEUMATOID ARTHRITIS, INVOLVING UNSPECIFIED SITE, UNSPECIFIED WHETHER RHEUMATOID FACTOR PRESENT: Primary | ICD-10-CM

## 2022-11-09 DIAGNOSIS — M79.2 NEURALGIA: ICD-10-CM

## 2022-11-09 DIAGNOSIS — M25.50 POLYARTHRALGIA: ICD-10-CM

## 2022-11-09 DIAGNOSIS — Z13.89 SCREENING FOR RHEUMATIC DISORDER: ICD-10-CM

## 2022-11-09 DIAGNOSIS — M05.79 RHEUMATOID ARTHRITIS INVOLVING MULTIPLE SITES WITH POSITIVE RHEUMATOID FACTOR: Primary | ICD-10-CM

## 2022-11-09 DIAGNOSIS — R74.01 ELEVATED LIVER TRANSAMINASE LEVEL: ICD-10-CM

## 2022-11-09 DIAGNOSIS — M79.10 MYALGIA: ICD-10-CM

## 2022-11-09 PROCEDURE — 3078F PR MOST RECENT DIASTOLIC BLOOD PRESSURE < 80 MM HG: ICD-10-PCS | Mod: CPTII,S$GLB,, | Performed by: INTERNAL MEDICINE

## 2022-11-09 PROCEDURE — 3008F BODY MASS INDEX DOCD: CPT | Mod: CPTII,S$GLB,, | Performed by: INTERNAL MEDICINE

## 2022-11-09 PROCEDURE — 3078F DIAST BP <80 MM HG: CPT | Mod: CPTII,S$GLB,, | Performed by: INTERNAL MEDICINE

## 2022-11-09 PROCEDURE — 1160F RVW MEDS BY RX/DR IN RCRD: CPT | Mod: CPTII,S$GLB,, | Performed by: INTERNAL MEDICINE

## 2022-11-09 PROCEDURE — 3074F PR MOST RECENT SYSTOLIC BLOOD PRESSURE < 130 MM HG: ICD-10-PCS | Mod: CPTII,S$GLB,, | Performed by: INTERNAL MEDICINE

## 2022-11-09 PROCEDURE — 3008F PR BODY MASS INDEX (BMI) DOCUMENTED: ICD-10-PCS | Mod: CPTII,S$GLB,, | Performed by: INTERNAL MEDICINE

## 2022-11-09 PROCEDURE — 3044F HG A1C LEVEL LT 7.0%: CPT | Mod: CPTII,S$GLB,, | Performed by: INTERNAL MEDICINE

## 2022-11-09 PROCEDURE — 99214 OFFICE O/P EST MOD 30 MIN: CPT | Mod: S$GLB,,, | Performed by: INTERNAL MEDICINE

## 2022-11-09 PROCEDURE — 1159F PR MEDICATION LIST DOCUMENTED IN MEDICAL RECORD: ICD-10-PCS | Mod: CPTII,S$GLB,, | Performed by: INTERNAL MEDICINE

## 2022-11-09 PROCEDURE — 99214 PR OFFICE/OUTPT VISIT, EST, LEVL IV, 30-39 MIN: ICD-10-PCS | Mod: S$GLB,,, | Performed by: INTERNAL MEDICINE

## 2022-11-09 PROCEDURE — 1160F PR REVIEW ALL MEDS BY PRESCRIBER/CLIN PHARMACIST DOCUMENTED: ICD-10-PCS | Mod: CPTII,S$GLB,, | Performed by: INTERNAL MEDICINE

## 2022-11-09 PROCEDURE — 3074F SYST BP LT 130 MM HG: CPT | Mod: CPTII,S$GLB,, | Performed by: INTERNAL MEDICINE

## 2022-11-09 PROCEDURE — 3044F PR MOST RECENT HEMOGLOBIN A1C LEVEL <7.0%: ICD-10-PCS | Mod: CPTII,S$GLB,, | Performed by: INTERNAL MEDICINE

## 2022-11-09 PROCEDURE — 1159F MED LIST DOCD IN RCRD: CPT | Mod: CPTII,S$GLB,, | Performed by: INTERNAL MEDICINE

## 2022-11-09 RX ORDER — HYDROXYCHLOROQUINE SULFATE 200 MG/1
200 TABLET, FILM COATED ORAL DAILY
Qty: 30 TABLET | Refills: 2 | Status: SHIPPED | OUTPATIENT
Start: 2022-11-09 | End: 2023-01-11 | Stop reason: SDUPTHER

## 2022-11-09 RX ORDER — DICLOFENAC SODIUM 50 MG/1
50 TABLET, DELAYED RELEASE ORAL 2 TIMES DAILY PRN
Qty: 60 TABLET | Refills: 2 | Status: SHIPPED | OUTPATIENT
Start: 2022-11-09 | End: 2023-01-11 | Stop reason: ALTCHOICE

## 2022-11-09 RX ORDER — PREGABALIN 150 MG/1
150 CAPSULE ORAL 2 TIMES DAILY
Qty: 60 CAPSULE | Refills: 2 | Status: SHIPPED | OUTPATIENT
Start: 2022-11-09 | End: 2023-01-11 | Stop reason: SDUPTHER

## 2022-11-10 ENCOUNTER — TELEPHONE (OUTPATIENT)
Dept: UROLOGY | Facility: CLINIC | Age: 42
End: 2022-11-10
Payer: COMMERCIAL

## 2022-11-10 NOTE — TELEPHONE ENCOUNTER
Pt requesting medication for bladder spasms that she got from [facility] months ago. Was unable to determine what medication she was referring to. I named several meds and she denied each one. I advised she maybe check with pharmacy to figure it out since she and I were not able to.

## 2022-11-10 NOTE — TELEPHONE ENCOUNTER
"----- Message from Juan Jose Juarez sent at 11/10/2022  8:09 AM CST -----  Contact: Patient  Patient need a Rx called in for bladder spasms.   Patient need the nurse to call her so she can tell you what script to call in              .  [facility] - [address] [place] [id number]  Phone: [phone] Fax: [phone]      CB# for patient  [phone]      "

## 2022-11-16 ENCOUNTER — OFFICE VISIT (OUTPATIENT)
Dept: UROLOGY | Facility: CLINIC | Age: 42
End: 2022-11-16
Payer: COMMERCIAL

## 2022-11-16 VITALS
DIASTOLIC BLOOD PRESSURE: 66 MMHG | HEIGHT: 59 IN | BODY MASS INDEX: 33.47 KG/M2 | WEIGHT: 166 LBS | SYSTOLIC BLOOD PRESSURE: 104 MMHG | HEART RATE: 86 BPM

## 2022-11-16 DIAGNOSIS — N30.10 INTERSTITIAL CYSTITIS: Primary | ICD-10-CM

## 2022-11-16 DIAGNOSIS — N32.89 BLADDER SPASMS: ICD-10-CM

## 2022-11-16 LAB — POC RESIDUAL URINE VOLUME: 35 ML (ref 0–100)

## 2022-11-16 PROCEDURE — 1160F PR REVIEW ALL MEDS BY PRESCRIBER/CLIN PHARMACIST DOCUMENTED: ICD-10-PCS | Mod: CPTII,S$GLB,, | Performed by: NURSE PRACTITIONER

## 2022-11-16 PROCEDURE — 51798 US URINE CAPACITY MEASURE: CPT | Mod: S$GLB,,, | Performed by: NURSE PRACTITIONER

## 2022-11-16 PROCEDURE — 3078F DIAST BP <80 MM HG: CPT | Mod: CPTII,S$GLB,, | Performed by: NURSE PRACTITIONER

## 2022-11-16 PROCEDURE — 1159F PR MEDICATION LIST DOCUMENTED IN MEDICAL RECORD: ICD-10-PCS | Mod: CPTII,S$GLB,, | Performed by: NURSE PRACTITIONER

## 2022-11-16 PROCEDURE — 99214 PR OFFICE/OUTPT VISIT, EST, LEVL IV, 30-39 MIN: ICD-10-PCS | Mod: 25,S$GLB,, | Performed by: NURSE PRACTITIONER

## 2022-11-16 PROCEDURE — 3044F PR MOST RECENT HEMOGLOBIN A1C LEVEL <7.0%: ICD-10-PCS | Mod: CPTII,S$GLB,, | Performed by: NURSE PRACTITIONER

## 2022-11-16 PROCEDURE — 3008F BODY MASS INDEX DOCD: CPT | Mod: CPTII,S$GLB,, | Performed by: NURSE PRACTITIONER

## 2022-11-16 PROCEDURE — 51798 POCT BLADDER SCAN: ICD-10-PCS | Mod: S$GLB,,, | Performed by: NURSE PRACTITIONER

## 2022-11-16 PROCEDURE — 96372 PR INJECTION,THERAP/PROPH/DIAG2ST, IM OR SUBCUT: ICD-10-PCS | Mod: S$GLB,,, | Performed by: NURSE PRACTITIONER

## 2022-11-16 PROCEDURE — 3074F PR MOST RECENT SYSTOLIC BLOOD PRESSURE < 130 MM HG: ICD-10-PCS | Mod: CPTII,S$GLB,, | Performed by: NURSE PRACTITIONER

## 2022-11-16 PROCEDURE — 1159F MED LIST DOCD IN RCRD: CPT | Mod: CPTII,S$GLB,, | Performed by: NURSE PRACTITIONER

## 2022-11-16 PROCEDURE — 3078F PR MOST RECENT DIASTOLIC BLOOD PRESSURE < 80 MM HG: ICD-10-PCS | Mod: CPTII,S$GLB,, | Performed by: NURSE PRACTITIONER

## 2022-11-16 PROCEDURE — 3074F SYST BP LT 130 MM HG: CPT | Mod: CPTII,S$GLB,, | Performed by: NURSE PRACTITIONER

## 2022-11-16 PROCEDURE — 3008F PR BODY MASS INDEX (BMI) DOCUMENTED: ICD-10-PCS | Mod: CPTII,S$GLB,, | Performed by: NURSE PRACTITIONER

## 2022-11-16 PROCEDURE — 99214 OFFICE O/P EST MOD 30 MIN: CPT | Mod: 25,S$GLB,, | Performed by: NURSE PRACTITIONER

## 2022-11-16 PROCEDURE — 1160F RVW MEDS BY RX/DR IN RCRD: CPT | Mod: CPTII,S$GLB,, | Performed by: NURSE PRACTITIONER

## 2022-11-16 PROCEDURE — 96372 THER/PROPH/DIAG INJ SC/IM: CPT | Mod: S$GLB,,, | Performed by: NURSE PRACTITIONER

## 2022-11-16 PROCEDURE — 3044F HG A1C LEVEL LT 7.0%: CPT | Mod: CPTII,S$GLB,, | Performed by: NURSE PRACTITIONER

## 2022-11-16 RX ORDER — METHOCARBAMOL 500 MG/1
500 TABLET, FILM COATED ORAL 4 TIMES DAILY
Qty: 40 TABLET | Refills: 0 | Status: SHIPPED | OUTPATIENT
Start: 2022-11-16 | End: 2022-11-26

## 2022-11-16 RX ORDER — KETOROLAC TROMETHAMINE 30 MG/ML
60 INJECTION, SOLUTION INTRAMUSCULAR; INTRAVENOUS
Status: COMPLETED | OUTPATIENT
Start: 2022-11-16 | End: 2022-11-16

## 2022-11-16 RX ADMIN — KETOROLAC TROMETHAMINE 60 MG: 30 INJECTION, SOLUTION INTRAMUSCULAR; INTRAVENOUS at 08:11

## 2022-11-16 NOTE — PROGRESS NOTES
Subjective:       Patient ID: Rosa Anguiano is a 42 y.o. female.    Chief Complaint: Bladder Spasms and Abdominal Pain      HPI: 42-year-old female, established patient, presents with IC flare.    Patient has history of interstitial cystitis.  Presents today complaining of bladder spasms.  Patient states for approximately week.    She denies any pain or burning urination.  Denies any.  Denies any fever or body aches.  Denies any blood in the urine.  Does have some frequency.  Denies any leakage.    She is on IC diet.  She also is on IC 2 caps.  She uses Uribel as needed.  And she is oxybutynin XL 15 mg daily.      She is had hydrodistention in .  She states it did not provide any significant improvement.    In the past patient was put on Robaxin and given a shot of Toradol.    Patient states this did provide relief for her bladder spasms at that time.  Requesting same.      No other urinary complaints at this time.       Past Medical History:   Past Medical History:   Diagnosis Date    Acute pelvic pain, female     Breast pain     Cystitis     Diabetes mellitus     Dyspareunia, female     Endometriosis     Fibromyalgia     GERD (gastroesophageal reflux disease)     IBS (irritable bowel syndrome)     Interstitial cystitis     Irregular menstrual cycle     Leukorrhea     Osteoarthritis     Ovarian cyst     Pneumonia     YAMILET (stress urinary incontinence, female)     Vaginal yeast infection     Vulvitis        Past Surgical Historical:   Past Surgical History:   Procedure Laterality Date     SECTION      CHOLECYSTECTOMY      LAPAROSCOPY-ASSISTED VAGINAL HYSTERECTOMY      TUBAL LIGATION          Medications:   Medication List with Changes/Refills   New Medications    METHOCARBAMOL (ROBAXIN) 500 MG TAB    Take 1 tablet (500 mg total) by mouth 4 (four) times daily. for 10 days   Current Medications    AMITRIPTYLINE (ELAVIL) 10 MG TABLET    Take 1 tablet (10 mg total) by mouth nightly as needed for Insomnia.     BLOOD-GLUCOSE METER,CONTINUOUS (DEXCOM G6 ) MISC    Use as instructed    BLOOD-GLUCOSE SENSOR (DEXCOM G6 SENSOR) MARIELENA    Use as instructed    BLOOD-GLUCOSE TRANSMITTER (DEXCOM G6 TRANSMITTER) MARIELENA    Use as instructed    BORIC ACID (PH-D) 600 MG VAGINAL SUPPOSITORY    Place 1 suppository (600 mg total) vaginally every evening. As directed    DICLOFENAC (VOLTAREN) 50 MG EC TABLET    Take 1 tablet (50 mg total) by mouth 2 (two) times daily as needed (pain).    DULOXETINE (CYMBALTA) 30 MG CAPSULE    Take 1 capsule (30 mg total) by mouth once daily.    FAMOTIDINE (PEPCID) 20 MG TABLET    TAKE 1 TABLET BY MOUTH TWICE DAILY AS NEEDED FOR STOMACH ACID OR REFLUX OR HEARTBURN    FLUTICASONE PROPIONATE (FLONASE) 50 MCG/ACTUATION NASAL SPRAY    2 sprays by Each Nostril route 2 (two) times daily.    HYDROXYCHLOROQUINE (PLAQUENIL) 200 MG TABLET    Take 1 tablet (200 mg total) by mouth once daily.    HYDROXYZINE PAMOATE (VISTARIL) 25 MG CAP        HYOSCYAMINE (ANASPAZ,LEVSIN) 0.125 MG TAB    Take 1 tablet (125 mcg total) by mouth every 4 (four) hours as needed (stomach pain).    MISCELLPunchd MEDICAL SUPPLY KIT    Auralgan drops for ear pain    OXYBUTYNIN (DITROPAN XL) 15 MG TR24    Take 1 tablet (15 mg total) by mouth once daily.    OZEMPIC 2 MG/DOSE (8 MG/3 ML) PNIJ        PANTOPRAZOLE (PROTONIX) 40 MG TABLET        PREGABALIN (LYRICA) 150 MG CAPSULE    Take 1 capsule (150 mg total) by mouth 2 (two) times daily.    PROAIR HFA 90 MCG/ACTUATION INHALER    INL 2 PFS PO Q 4 H    URO--10-40.8-36 MG CAP    TAKE 1 CAPSULE BY MOUTH THREE TIMES DAILY AS NEEDED FOR BLADDER PAIN    VALACYCLOVIR (VALTREX) 1000 MG TABLET    Take 1 tablet (1,000 mg total) by mouth every 12 (twelve) hours. for 10 days        Past Social History:   Social History     Socioeconomic History    Marital status:    Tobacco Use    Smoking status: Never    Smokeless tobacco: Never   Substance and Sexual Activity    Alcohol use: Not Currently     Drug use: Never    Sexual activity: Yes     Partners: Male     Birth control/protection: See Surgical Hx     Comment: Hysterectomy       Allergies: Review of patient's allergies indicates:  No Known Allergies     Family History:   Family History   Problem Relation Age of Onset    Colon cancer Father 60    Prostate cancer Father 60    Breast cancer Sister 66    Ovarian cancer Neg Hx     Uterine cancer Neg Hx     Melanoma Neg Hx     Pancreatic cancer Neg Hx         Review of Systems:  Review of Systems   Constitutional:  Negative for activity change and appetite change.   HENT:  Negative for congestion and dental problem.    Respiratory:  Negative for chest tightness and shortness of breath.    Cardiovascular:  Negative for chest pain.   Gastrointestinal:  Negative for abdominal distention and abdominal pain.   Genitourinary:  Positive for pelvic pain. Negative for decreased urine volume, difficulty urinating, dyspareunia, dysuria, enuresis, flank pain, frequency, genital sores, hematuria and urgency.   Musculoskeletal:  Negative for back pain and neck pain.   Allergic/Immunologic: Negative for immunocompromised state.   Neurological:  Negative for dizziness.   Hematological:  Negative for adenopathy.   Psychiatric/Behavioral:  Negative for agitation, behavioral problems and confusion.      Physical Exam:  Physical Exam  Vitals and nursing note reviewed.   Constitutional:       Appearance: She is well-developed.   HENT:      Head: Normocephalic.   Eyes:      Pupils: Pupils are equal, round, and reactive to light.   Cardiovascular:      Rate and Rhythm: Normal rate and regular rhythm.      Heart sounds: Normal heart sounds.   Pulmonary:      Effort: Pulmonary effort is normal.      Breath sounds: Normal breath sounds.   Abdominal:      General: Bowel sounds are normal.      Palpations: Abdomen is soft.   Musculoskeletal:         General: Normal range of motion.      Cervical back: Normal range of motion and neck  supple.   Skin:     General: Skin is warm and dry.   Neurological:      Mental Status: She is alert and oriented to person, place, and time.   Psychiatric:         Mood and Affect: Mood normal.         Behavior: Behavior normal.     Trace intact blood, blood cells 0-3.  Negative leukocytes.    Bladder scan:  38 cc    Assessment/Plan:   Interstitial cystitis/bladder spasms:  Patient encouraged to tighten up her IC diet.    Also encouraged to increase hydration.  Continue IC 2 caps and oxybutynin.    Patient provided prescription for Robaxin 500 mg.  Toradol shot given in office, 60 mg.    Did discuss Botox.  Patient would like to hold off at this time.    Patient provided samples of aloe vera capsules.      Plan follow-up in 6 weeks for re-evaluation, sooner if needed.  Problem List Items Addressed This Visit    None  Visit Diagnoses       Interstitial cystitis    -  Primary    Relevant Medications    methocarbamoL (ROBAXIN) 500 MG Tab    ketorolac injection 60 mg    Bladder spasms        Relevant Medications    methocarbamoL (ROBAXIN) 500 MG Tab    ketorolac injection 60 mg    Other Relevant Orders    POCT Urinalysis (w/Micro Option)

## 2022-11-17 ENCOUNTER — TELEPHONE (OUTPATIENT)
Dept: FAMILY MEDICINE | Facility: CLINIC | Age: 42
End: 2022-11-17
Payer: COMMERCIAL

## 2022-11-17 ENCOUNTER — PATIENT MESSAGE (OUTPATIENT)
Dept: FAMILY MEDICINE | Facility: CLINIC | Age: 42
End: 2022-11-17
Payer: COMMERCIAL

## 2022-11-17 NOTE — TELEPHONE ENCOUNTER
----- Message from Roberto Carlos Verma sent at 11/17/2022  8:20 AM CST -----  Contact: self  Pt called and asked if she can get something called out for a soar throat/ear infection. Please call   [phone]

## 2022-12-05 ENCOUNTER — OFFICE VISIT (OUTPATIENT)
Dept: FAMILY MEDICINE | Facility: CLINIC | Age: 42
End: 2022-12-05
Payer: COMMERCIAL

## 2022-12-05 VITALS — DIASTOLIC BLOOD PRESSURE: 78 MMHG | SYSTOLIC BLOOD PRESSURE: 115 MMHG | HEART RATE: 78 BPM

## 2022-12-05 DIAGNOSIS — N32.89 BLADDER SPASMS: Primary | ICD-10-CM

## 2022-12-05 DIAGNOSIS — R10.9 ABDOMINAL PAIN, UNSPECIFIED ABDOMINAL LOCATION: ICD-10-CM

## 2022-12-05 DIAGNOSIS — R10.11 RUQ ABDOMINAL PAIN: ICD-10-CM

## 2022-12-05 DIAGNOSIS — R10.32 LLQ ABDOMINAL PAIN: ICD-10-CM

## 2022-12-05 DIAGNOSIS — H60.90 OTITIS EXTERNA, UNSPECIFIED CHRONICITY, UNSPECIFIED LATERALITY, UNSPECIFIED TYPE: ICD-10-CM

## 2022-12-05 LAB
BILIRUB SERPL-MCNC: NEGATIVE MG/DL
BLOOD URINE, POC: NEGATIVE
CLARITY, POC UA: CLEAR
COLOR, POC UA: YELLOW
GLUCOSE UR QL STRIP: NEGATIVE
KETONES UR QL STRIP: NEGATIVE
LEUKOCYTE ESTERASE URINE, POC: NEGATIVE
NITRITE, POC UA: NEGATIVE
PH, POC UA: 6
PROTEIN, POC: NEGATIVE
SPECIFIC GRAVITY, POC UA: 1.01
UROBILINOGEN, POC UA: 0.2

## 2022-12-05 PROCEDURE — 1159F MED LIST DOCD IN RCRD: CPT | Mod: CPTII,S$GLB,, | Performed by: PHYSICIAN ASSISTANT

## 2022-12-05 PROCEDURE — 3044F HG A1C LEVEL LT 7.0%: CPT | Mod: CPTII,S$GLB,, | Performed by: PHYSICIAN ASSISTANT

## 2022-12-05 PROCEDURE — 3044F PR MOST RECENT HEMOGLOBIN A1C LEVEL <7.0%: ICD-10-PCS | Mod: CPTII,S$GLB,, | Performed by: PHYSICIAN ASSISTANT

## 2022-12-05 PROCEDURE — 81002 URINALYSIS NONAUTO W/O SCOPE: CPT | Mod: S$GLB,,, | Performed by: PHYSICIAN ASSISTANT

## 2022-12-05 PROCEDURE — 99214 PR OFFICE/OUTPT VISIT, EST, LEVL IV, 30-39 MIN: ICD-10-PCS | Mod: S$GLB,,, | Performed by: PHYSICIAN ASSISTANT

## 2022-12-05 PROCEDURE — 99214 OFFICE O/P EST MOD 30 MIN: CPT | Mod: S$GLB,,, | Performed by: PHYSICIAN ASSISTANT

## 2022-12-05 PROCEDURE — 3078F PR MOST RECENT DIASTOLIC BLOOD PRESSURE < 80 MM HG: ICD-10-PCS | Mod: CPTII,S$GLB,, | Performed by: PHYSICIAN ASSISTANT

## 2022-12-05 PROCEDURE — 81002 POCT URINE DIPSTICK WITHOUT MICROSCOPE: ICD-10-PCS | Mod: S$GLB,,, | Performed by: PHYSICIAN ASSISTANT

## 2022-12-05 PROCEDURE — 1159F PR MEDICATION LIST DOCUMENTED IN MEDICAL RECORD: ICD-10-PCS | Mod: CPTII,S$GLB,, | Performed by: PHYSICIAN ASSISTANT

## 2022-12-05 PROCEDURE — 3074F PR MOST RECENT SYSTOLIC BLOOD PRESSURE < 130 MM HG: ICD-10-PCS | Mod: CPTII,S$GLB,, | Performed by: PHYSICIAN ASSISTANT

## 2022-12-05 PROCEDURE — 3078F DIAST BP <80 MM HG: CPT | Mod: CPTII,S$GLB,, | Performed by: PHYSICIAN ASSISTANT

## 2022-12-05 PROCEDURE — 3074F SYST BP LT 130 MM HG: CPT | Mod: CPTII,S$GLB,, | Performed by: PHYSICIAN ASSISTANT

## 2022-12-05 RX ORDER — CIPROFLOXACIN AND DEXAMETHASONE 3; 1 MG/ML; MG/ML
4 SUSPENSION/ DROPS AURICULAR (OTIC) 2 TIMES DAILY
Qty: 3 ML | Refills: 0 | Status: SHIPPED | OUTPATIENT
Start: 2022-12-05 | End: 2022-12-12

## 2022-12-05 NOTE — PROGRESS NOTES
Subjective:      Patient ID: Rosa Anguiano is a 42 y.o. female.    Chief Complaint: Follow-up, Back Pain, Otalgia (Right ear pain ), and Leg Pain (Left lower leg/calf pain)      HPI  Pt is here today with a cc of back pain, abd pain, left pain and right ear pain     Right ear- pt reports right ear pain not fully resolved since last visti.     Burning sensation in her left leg,  no trauma.  Started 5 days ago, lasted 3 days then resolved.      New pain over RUQ that radiates around rib cage to back. Reports associated cough x 3 days     S/p GB removal   Review of Systems   Constitutional:  Negative for activity change, appetite change, chills, diaphoresis, fatigue and fever.   HENT:  Positive for ear pain. Negative for nasal congestion, postnasal drip, rhinorrhea, sore throat, trouble swallowing and voice change.    Eyes:  Negative for pain and redness.   Respiratory:  Negative for cough, choking, chest tightness, shortness of breath and wheezing.    Cardiovascular:  Negative for chest pain and leg swelling.   Gastrointestinal:  Positive for abdominal pain. Negative for abdominal distention, blood in stool, constipation, diarrhea, nausea, vomiting and reflux.   Genitourinary:  Negative for bladder incontinence, decreased urine volume, difficulty urinating, dysuria, enuresis, flank pain, frequency, hematuria, nocturia, pelvic pain, urgency, vaginal bleeding, vaginal discharge, vaginal pain and vaginal dryness.        SP pressure and spasm   Musculoskeletal:  Negative for arthralgias, back pain, leg pain, myalgias, neck pain and neck stiffness.   Integumentary:  Negative for rash.   Neurological:  Negative for dizziness, weakness, numbness and headaches.   Hematological:  Negative for adenopathy.     Medication List with Changes/Refills   New Medications    CIPROFLOXACIN-DEXAMETHASONE 0.3-0.1% (CIPRODEX) 0.3-0.1 % DRPS    Place 4 drops into the right ear 2 (two) times daily. for 7 days   Current Medications     AMITRIPTYLINE (ELAVIL) 10 MG TABLET    Take 1 tablet (10 mg total) by mouth nightly as needed for Insomnia.    BLOOD-GLUCOSE METER,CONTINUOUS (DEXCOM G6 ) MISC    Use as instructed    BLOOD-GLUCOSE SENSOR (DEXCOM G6 SENSOR) MARIELENA    Use as instructed    BLOOD-GLUCOSE TRANSMITTER (DEXCOM G6 TRANSMITTER) MARIELENA    Use as instructed    BORIC ACID (PH-D) 600 MG VAGINAL SUPPOSITORY    Place 1 suppository (600 mg total) vaginally every evening. As directed    DICLOFENAC (VOLTAREN) 50 MG EC TABLET    Take 1 tablet (50 mg total) by mouth 2 (two) times daily as needed (pain).    DULOXETINE (CYMBALTA) 30 MG CAPSULE    Take 1 capsule (30 mg total) by mouth once daily.    FAMOTIDINE (PEPCID) 20 MG TABLET    TAKE 1 TABLET BY MOUTH TWICE DAILY AS NEEDED FOR STOMACH ACID OR REFLUX OR HEARTBURN    FLUTICASONE PROPIONATE (FLONASE) 50 MCG/ACTUATION NASAL SPRAY    2 sprays by Each Nostril route 2 (two) times daily.    HYDROXYCHLOROQUINE (PLAQUENIL) 200 MG TABLET    Take 1 tablet (200 mg total) by mouth once daily.    HYDROXYZINE PAMOATE (VISTARIL) 25 MG CAP        HYOSCYAMINE (ANASPAZ,LEVSIN) 0.125 MG TAB    Take 1 tablet (125 mcg total) by mouth every 4 (four) hours as needed (stomach pain).    MISCELLANEOUS MEDICAL SUPPLY KIT    Auralgan drops for ear pain    OXYBUTYNIN (DITROPAN XL) 15 MG TR24    Take 1 tablet (15 mg total) by mouth once daily.    OZEMPIC 2 MG/DOSE (8 MG/3 ML) PNIJ        PANTOPRAZOLE (PROTONIX) 40 MG TABLET        PREGABALIN (LYRICA) 150 MG CAPSULE    Take 1 capsule (150 mg total) by mouth 2 (two) times daily.    PROAIR HFA 90 MCG/ACTUATION INHALER    INL 2 PFS PO Q 4 H    URO--10-40.8-36 MG CAP    TAKE 1 CAPSULE BY MOUTH THREE TIMES DAILY AS NEEDED FOR BLADDER PAIN    VALACYCLOVIR (VALTREX) 1000 MG TABLET    Take 1 tablet (1,000 mg total) by mouth every 12 (twelve) hours. for 10 days        Objective:     Vitals:    12/05/22 1318   BP: 115/78   Pulse: 78        Physical Exam  Constitutional:        General: She is not in acute distress.     Appearance: She is not ill-appearing, toxic-appearing or diaphoretic.   HENT:      Head: Normocephalic.      Right Ear: Tympanic membrane and ear canal normal. No mastoid tenderness.      Left Ear: Tympanic membrane, ear canal and external ear normal. No mastoid tenderness.      Ears:      Comments: Erythematous right EC, Tragal tenderness on exam      Nose: Nose normal.      Mouth/Throat:      Mouth: Mucous membranes are moist.      Pharynx: Oropharynx is clear. No oropharyngeal exudate.   Eyes:      General: No scleral icterus.        Right eye: No discharge.         Left eye: No discharge.      Conjunctiva/sclera: Conjunctivae normal.   Cardiovascular:      Rate and Rhythm: Normal rate and regular rhythm.      Pulses: Normal pulses.      Heart sounds: Normal heart sounds. No murmur heard.    No friction rub. No gallop.   Pulmonary:      Effort: Pulmonary effort is normal. No respiratory distress.      Breath sounds: No wheezing, rhonchi or rales.   Abdominal:      Tenderness: There is abdominal tenderness (TTP over RUQ and LLQ).   Musculoskeletal:         General: No swelling.      Cervical back: Normal range of motion. No rigidity or tenderness.      Right lower leg: No edema.      Left lower leg: No edema.      Comments: Moves all extremities well and with good control    Lymphadenopathy:      Cervical: No cervical adenopathy.   Skin:     General: Skin is warm and dry.   Neurological:      Mental Status: She is alert and oriented to person, place, and time.   Psychiatric:         Mood and Affect: Mood normal.         Behavior: Behavior normal.          Assessment & Plan:     Bladder spasms  -     POCT URINE DIPSTICK WITHOUT MICROSCOPE    Abdominal pain, unspecified abdominal location  -     CBC Auto Differential; Future; Expected date: 12/05/2022  -     Comprehensive Metabolic Panel; Future; Expected date: 12/05/2022  -     Hemoglobin A1C; Future; Expected date:  12/05/2022  -     Lipase; Future; Expected date: 12/05/2022    RUQ abdominal pain  -     CBC Auto Differential; Future; Expected date: 12/05/2022  -     Comprehensive Metabolic Panel; Future; Expected date: 12/05/2022  -     Hemoglobin A1C; Future; Expected date: 12/05/2022  -     Lipase; Future; Expected date: 12/05/2022  -     CT Abdomen Pelvis  Without Contrast; Future; Expected date: 12/05/2022    LLQ abdominal pain  -     CBC Auto Differential; Future; Expected date: 12/05/2022  -     Comprehensive Metabolic Panel; Future; Expected date: 12/05/2022  -     Hemoglobin A1C; Future; Expected date: 12/05/2022  -     Lipase; Future; Expected date: 12/05/2022  -     CT Abdomen Pelvis  Without Contrast; Future; Expected date: 12/05/2022    Otitis externa, unspecified chronicity, unspecified laterality, unspecified type  -     ciprofloxacin-dexAMETHasone 0.3-0.1% (CIPRODEX) 0.3-0.1 % DrpS; Place 4 drops into the right ear 2 (two) times daily. for 7 days  Dispense: 3 mL; Refill: 0         UA within normal limits   Ordering abd imaging today  ER if worsening at any time or fever arises.         -Patient instructed that our office calls back on all labs and imaging within 1 week of receiving the results. Patient instructed to reach out to our office if they have not heard from us so that we can request the results from the lab/imaging center           Tameka Dyson PA-C

## 2022-12-07 LAB
ESTIMATED AVERAGE GLUCOSE: 127 MG/DL
HBA1C MFR BLD: 6 % (ref 4–6)
LIPASE: 38 U/L (ref 13–60)
MULTIPLE ORDERS: NORMAL

## 2022-12-09 ENCOUNTER — TELEPHONE (OUTPATIENT)
Dept: FAMILY MEDICINE | Facility: CLINIC | Age: 42
End: 2022-12-09
Payer: COMMERCIAL

## 2022-12-09 NOTE — TELEPHONE ENCOUNTER
Called and talked with patient informed that [facility] English is out the clinic until Monday 12/12/2022, patient asked could send something for pain. Informed patient will send a message to [facility] English. Patient stated understanding and thank you.

## 2022-12-09 NOTE — TELEPHONE ENCOUNTER
----- Message from Marina Yu sent at 12/9/2022 12:48 PM CST -----  Contact: Patient  Type:  Same Day Appointment Request    Caller is requesting a same day appointment.  Caller declined first available appointment listed below.    Name of Caller:Rosa Anguiano  When is the first available appointment?12/28/2022  Symptoms:muscle/back spasm  Best Call Back Number:[phone]  Additional Information: Patient states if she isn't able to come in today, she would like something called out to her pharmacy if possible.

## 2022-12-12 ENCOUNTER — TELEPHONE (OUTPATIENT)
Dept: FAMILY MEDICINE | Facility: CLINIC | Age: 42
End: 2022-12-12
Payer: COMMERCIAL

## 2022-12-12 NOTE — TELEPHONE ENCOUNTER
----- Message from Selam Quintero LPN sent at 12/9/2022  3:57 PM CST -----  Contact: Patient  Called patient informed that you are out of the office until Monday, patient stated that she has appointment on Tuesday. Patient wanted to know if can send something out for her back, states that she is in pain. Informed patient will send you the message.   ----- Message -----  From: Marina Yu  Sent: 12/9/2022  12:50 PM CST  To: [facility] English Staff    Type:  Same Day Appointment Request    Caller is requesting a same day appointment.  Caller declined first available appointment listed below.    Name of Caller:Rosa Silvio  When is the first available appointment?12/28/2022  Symptoms:muscle/back spasm  Best Call Back Number:[phone]  Additional Information: Patient states if she isn't able to come in today, she would like something called out to her pharmacy if possible.

## 2022-12-13 ENCOUNTER — OFFICE VISIT (OUTPATIENT)
Dept: FAMILY MEDICINE | Facility: CLINIC | Age: 42
End: 2022-12-13
Payer: COMMERCIAL

## 2022-12-13 ENCOUNTER — TELEPHONE (OUTPATIENT)
Dept: FAMILY MEDICINE | Facility: CLINIC | Age: 42
End: 2022-12-13

## 2022-12-13 VITALS
SYSTOLIC BLOOD PRESSURE: 115 MMHG | HEART RATE: 88 BPM | OXYGEN SATURATION: 99 % | BODY MASS INDEX: 33.54 KG/M2 | HEIGHT: 59 IN | WEIGHT: 166.38 LBS | DIASTOLIC BLOOD PRESSURE: 58 MMHG

## 2022-12-13 DIAGNOSIS — M54.9 BACK PAIN, UNSPECIFIED BACK LOCATION, UNSPECIFIED BACK PAIN LATERALITY, UNSPECIFIED CHRONICITY: ICD-10-CM

## 2022-12-13 DIAGNOSIS — R07.9 CHEST PAIN, UNSPECIFIED TYPE: ICD-10-CM

## 2022-12-13 DIAGNOSIS — H92.01 RIGHT EAR PAIN: Primary | ICD-10-CM

## 2022-12-13 PROCEDURE — 3044F HG A1C LEVEL LT 7.0%: CPT | Mod: CPTII,S$GLB,, | Performed by: PHYSICIAN ASSISTANT

## 2022-12-13 PROCEDURE — 3044F PR MOST RECENT HEMOGLOBIN A1C LEVEL <7.0%: ICD-10-PCS | Mod: CPTII,S$GLB,, | Performed by: PHYSICIAN ASSISTANT

## 2022-12-13 PROCEDURE — 3008F PR BODY MASS INDEX (BMI) DOCUMENTED: ICD-10-PCS | Mod: CPTII,S$GLB,, | Performed by: PHYSICIAN ASSISTANT

## 2022-12-13 PROCEDURE — 1159F PR MEDICATION LIST DOCUMENTED IN MEDICAL RECORD: ICD-10-PCS | Mod: CPTII,S$GLB,, | Performed by: PHYSICIAN ASSISTANT

## 2022-12-13 PROCEDURE — 99214 PR OFFICE/OUTPT VISIT, EST, LEVL IV, 30-39 MIN: ICD-10-PCS | Mod: S$GLB,,, | Performed by: PHYSICIAN ASSISTANT

## 2022-12-13 PROCEDURE — 3078F PR MOST RECENT DIASTOLIC BLOOD PRESSURE < 80 MM HG: ICD-10-PCS | Mod: CPTII,S$GLB,, | Performed by: PHYSICIAN ASSISTANT

## 2022-12-13 PROCEDURE — 3078F DIAST BP <80 MM HG: CPT | Mod: CPTII,S$GLB,, | Performed by: PHYSICIAN ASSISTANT

## 2022-12-13 PROCEDURE — 3008F BODY MASS INDEX DOCD: CPT | Mod: CPTII,S$GLB,, | Performed by: PHYSICIAN ASSISTANT

## 2022-12-13 PROCEDURE — 1159F MED LIST DOCD IN RCRD: CPT | Mod: CPTII,S$GLB,, | Performed by: PHYSICIAN ASSISTANT

## 2022-12-13 PROCEDURE — 3074F PR MOST RECENT SYSTOLIC BLOOD PRESSURE < 130 MM HG: ICD-10-PCS | Mod: CPTII,S$GLB,, | Performed by: PHYSICIAN ASSISTANT

## 2022-12-13 PROCEDURE — 3074F SYST BP LT 130 MM HG: CPT | Mod: CPTII,S$GLB,, | Performed by: PHYSICIAN ASSISTANT

## 2022-12-13 PROCEDURE — 99214 OFFICE O/P EST MOD 30 MIN: CPT | Mod: S$GLB,,, | Performed by: PHYSICIAN ASSISTANT

## 2022-12-13 RX ORDER — MELOXICAM 7.5 MG/1
7.5 TABLET ORAL DAILY
Qty: 30 TABLET | Refills: 0 | Status: SHIPPED | OUTPATIENT
Start: 2022-12-13 | End: 2023-01-11

## 2022-12-13 RX ORDER — CYCLOBENZAPRINE HCL 10 MG
10 TABLET ORAL 3 TIMES DAILY PRN
Qty: 30 TABLET | Refills: 1 | Status: SHIPPED | OUTPATIENT
Start: 2022-12-13 | End: 2022-12-23

## 2022-12-13 NOTE — PROGRESS NOTES
Subjective:      Patient ID: Rosa Anguiano is a 42 y.o. female.    Chief Complaint: Back Pain (Patient is here for back spasm x's 3 weeks)      HPI  Patient is here patient is here today for follow-up.    Right ear pain-Persisting right ear pain.  Despite multiple treatments    Right upper quadrant/right flank pain- Still having right sided abd and flank pain from last visit.  Her CT was approved today.  Patient instructed to call and schedule her imaging.  Patient reports that this pain seems to be slightly worse Since she is been coughing.  Worse with movement better with rest.    Cough and congestion-patient reports 3 day history of cough and congestion with midline chest pressure and discomfort when she takes a deep breath in.  Patient reports multiple family members with URI at this time.    Review of Systems   Constitutional:  Negative for appetite change, chills, fatigue and fever.   HENT:  Positive for ear pain. Negative for nasal congestion, ear discharge, facial swelling, postnasal drip, rhinorrhea, sore throat, trouble swallowing and voice change.    Eyes:  Negative for pain and redness.   Respiratory:  Positive for cough. Negative for chest tightness, shortness of breath and wheezing.    Cardiovascular:  Negative for chest pain.   Gastrointestinal:  Negative for abdominal pain, diarrhea, nausea and vomiting.   Genitourinary:  Negative for decreased urine volume, dysuria, flank pain, frequency, hematuria, nocturia and urgency.   Musculoskeletal:  Positive for arthralgias and back pain. Negative for neck pain and neck stiffness.   Integumentary:  Negative for rash.   Neurological:  Negative for dizziness, weakness, numbness and headaches.   Hematological:  Negative for adenopathy.     Medication List with Changes/Refills   New Medications    CYCLOBENZAPRINE (FLEXERIL) 10 MG TABLET    Take 1 tablet (10 mg total) by mouth 3 (three) times daily as needed for Muscle spasms.    MELOXICAM (MOBIC) 7.5 MG TABLET     Take 1 tablet (7.5 mg total) by mouth once daily.   Current Medications    AMITRIPTYLINE (ELAVIL) 10 MG TABLET    Take 1 tablet (10 mg total) by mouth nightly as needed for Insomnia.    BLOOD-GLUCOSE METER,CONTINUOUS (DEXCOM G6 ) MISC    Use as instructed    BLOOD-GLUCOSE SENSOR (DEXCOM G6 SENSOR) MARIELENA    Use as instructed    BLOOD-GLUCOSE TRANSMITTER (DEXCOM G6 TRANSMITTER) MARIELENA    Use as instructed    BORIC ACID (PH-D) 600 MG VAGINAL SUPPOSITORY    Place 1 suppository (600 mg total) vaginally every evening. As directed    DICLOFENAC (VOLTAREN) 50 MG EC TABLET    Take 1 tablet (50 mg total) by mouth 2 (two) times daily as needed (pain).    DULOXETINE (CYMBALTA) 30 MG CAPSULE    Take 1 capsule (30 mg total) by mouth once daily.    FAMOTIDINE (PEPCID) 20 MG TABLET    TAKE 1 TABLET BY MOUTH TWICE DAILY AS NEEDED FOR STOMACH ACID OR REFLUX OR HEARTBURN    FLUTICASONE PROPIONATE (FLONASE) 50 MCG/ACTUATION NASAL SPRAY    2 sprays by Each Nostril route 2 (two) times daily.    HYDROXYCHLOROQUINE (PLAQUENIL) 200 MG TABLET    Take 1 tablet (200 mg total) by mouth once daily.    HYDROXYZINE PAMOATE (VISTARIL) 25 MG CAP        HYOSCYAMINE (ANASPAZ,LEVSIN) 0.125 MG TAB    Take 1 tablet (125 mcg total) by mouth every 4 (four) hours as needed (stomach pain).    MISCELLANEOUS MEDICAL SUPPLY KIT    Auralgan drops for ear pain    OXYBUTYNIN (DITROPAN XL) 15 MG TR24    Take 1 tablet (15 mg total) by mouth once daily.    OZEMPIC 2 MG/DOSE (8 MG/3 ML) PNIJ        PANTOPRAZOLE (PROTONIX) 40 MG TABLET        PREGABALIN (LYRICA) 150 MG CAPSULE    Take 1 capsule (150 mg total) by mouth 2 (two) times daily.    PROAIR HFA 90 MCG/ACTUATION INHALER    INL 2 PFS PO Q 4 H    VALACYCLOVIR (VALTREX) 1000 MG TABLET    Take 1 tablet (1,000 mg total) by mouth every 12 (twelve) hours. for 10 days   Discontinued Medications    URO--10-40.8-36 MG CAP    TAKE 1 CAPSULE BY MOUTH THREE TIMES DAILY AS NEEDED FOR BLADDER PAIN        Objective:  "    Vitals:    12/13/22 1336   BP: (!) 115/58   Pulse: 88   SpO2: 99%   Weight: 75.5 kg (166 lb 6.4 oz)   Height: 4' 11" (1.499 m)        Physical Exam  Constitutional:       General: She is not in acute distress.     Appearance: She is not ill-appearing, toxic-appearing or diaphoretic.   HENT:      Head: Normocephalic.      Right Ear: Tympanic membrane, ear canal and external ear normal. No mastoid tenderness.      Left Ear: Tympanic membrane, ear canal and external ear normal. No mastoid tenderness.      Nose: Nose normal.      Mouth/Throat:      Mouth: Mucous membranes are moist.      Pharynx: Oropharynx is clear. No oropharyngeal exudate.   Eyes:      General: No scleral icterus.        Right eye: No discharge.         Left eye: No discharge.      Conjunctiva/sclera: Conjunctivae normal.   Cardiovascular:      Rate and Rhythm: Normal rate and regular rhythm.      Pulses: Normal pulses.      Heart sounds: Normal heart sounds. No murmur heard.    No friction rub. No gallop.   Pulmonary:      Effort: Pulmonary effort is normal. No respiratory distress.      Breath sounds: No wheezing, rhonchi or rales.   Musculoskeletal:         General: No swelling.      Cervical back: Normal range of motion. No rigidity or tenderness.        Back:       Right lower leg: No edema.      Left lower leg: No edema.      Comments: Area of pain marked image.   Lymphadenopathy:      Cervical: No cervical adenopathy.   Skin:     General: Skin is warm and dry.   Neurological:      Mental Status: She is alert and oriented to person, place, and time.   Psychiatric:         Mood and Affect: Mood normal.         Behavior: Behavior normal.          Assessment & Plan:     Right ear pain  Comments:  Exam unremarkable today.  Patient recommended follow-up with her established ENT.  Patient verbalized understanding.    Chest pain, unspecified type  -     X-Ray Chest PA And Lateral; Future; Expected date: 12/13/2022    Back pain, unspecified back " location, unspecified back pain laterality, unspecified chronicity  -     cyclobenzaprine (FLEXERIL) 10 MG tablet; Take 1 tablet (10 mg total) by mouth 3 (three) times daily as needed for Muscle spasms.  Dispense: 30 tablet; Refill: 1  -     meloxicam (MOBIC) 7.5 MG tablet; Take 1 tablet (7.5 mg total) by mouth once daily.  Dispense: 30 tablet; Refill: 0           Medication precautions given       -Patient instructed that our office calls back on all labs and imaging within 1 week of receiving the results. Patient instructed to reach out to our office if they have not heard from us so that we can request the results from the lab/imaging center           Tameka Dyson PA-C

## 2022-12-13 NOTE — TELEPHONE ENCOUNTER
Taylor Dyson PA-C; BRYSON English Staff; Selam Quintero LPN  Good Afternoon,     The patient referenced above is scheduled for an OP Abdomen and Pelvis CT however, the authorization has been denied through [facility]. Case:[id number]         Denial Rational Description:   Why won't [facility] pay for Abdomen and Pelvis CT?   · What was given to us in your doctor's notes or phone call does not meet [facility]'s Guidelines for   a(n) Abdomen and Pelvis CT.   · Your doctor said you have belly pain.   · According to [facility] Clinical Guideline 068 for Abdomen Pelvis CT we need results of other tests   that were done first (such as blood, urine, x-rays with or without dye (barium), or scope tests).   Results of those tests should show why this test is still needed before we can review for an   approval.   · From the doctor's notes and/or phone call, we have not received the information needed       If Tameka Dyson PA-C wishes to have a physician to physician discussion to have the decision -overturned, Tameka Dyson PA-C should call [phone] and follow the prompts to engage in a Physician-to-Physician discussion. Please ensure Tameka Dyson PA-C has the case number available when making the call.     Kindly,   Taylor Maldonado

## 2023-01-03 NOTE — PROGRESS NOTES
Subjective:      Patient ID: Rosa Anguiano is a 42 y.o. female.    Chief Complaint: Disease Management    HPI:   Includes joint pain with subjective swelling.   Antecedent event includes: None;  Pain location includes: elbows, hips, and knees;  Gradual onset; beginning >years ago;  Constant ache, Moderate in severity,   Lasting >minutes, Worse during the daytime;   Improved with rest, medication, change in position, and none;   Worsened with activity, overuse, stress, and rest;         Review of Systems   Constitutional:  Positive for fatigue. Negative for chills and fever.   HENT:  Positive for sore throat. Negative for nasal congestion, ear pain, hearing loss, mouth dryness, mouth sores, nosebleeds and trouble swallowing.    Eyes:  Positive for itching. Negative for photophobia, pain, redness, visual disturbance and eye dryness.   Respiratory:  Negative for cough and shortness of breath.    Cardiovascular:  Positive for leg swelling. Negative for chest pain and palpitations.   Gastrointestinal:  Negative for abdominal distention, abdominal pain, blood in stool, constipation, diarrhea, rectal pain, vomiting and fecal incontinence.   Endocrine: Negative for polydipsia and polyuria.   Genitourinary:  Positive for dysuria. Negative for bladder incontinence, enuresis, genital sores and hematuria.   Musculoskeletal:  Positive for arthralgias, joint swelling and myalgias.   Integumentary:  Negative for rash, wound and mole/lesion.   Neurological:  Positive for weakness. Negative for headaches.   Hematological:  Positive for adenopathy. Does not bruise/bleed easily.   Psychiatric/Behavioral:  Positive for sleep disturbance. Negative for dysphoric mood. The patient is not nervous/anxious.     Past Medical History:   Diagnosis Date    Acute pelvic pain, female     Breast pain     Cystitis     Diabetes mellitus     Dyspareunia, female     Endometriosis     Fibromyalgia     GERD (gastroesophageal reflux disease)     IBS  (irritable bowel syndrome)     Interstitial cystitis     Irregular menstrual cycle     Leukorrhea     Osteoarthritis     Ovarian cyst     Pneumonia     YAMILET (stress urinary incontinence, female)     Vaginal yeast infection     Vulvitis      Past Surgical History:   Procedure Laterality Date     SECTION      CHOLECYSTECTOMY      LAPAROSCOPY-ASSISTED VAGINAL HYSTERECTOMY      TUBAL LIGATION        See the Assessment/Plan for further characterization of the HPI, ROS, Medical, Surgical, Family, and Social Histories including Tobacco use, Meds; all of which has been reviewed in Epic.    Medication List with Changes/Refills   New Medications    NABUMETONE (RELAFEN) 750 MG TABLET    Take 1 tablet (750 mg total) by mouth 2 (two) times daily as needed for Pain (instead of diclofenac).   Current Medications    AMITRIPTYLINE (ELAVIL) 10 MG TABLET    Take 1 tablet (10 mg total) by mouth nightly as needed for Insomnia.    BLOOD-GLUCOSE METER,CONTINUOUS (DEXCOM G6 ) MISC    Use as instructed    BLOOD-GLUCOSE SENSOR (DEXCOM G6 SENSOR) MARIELENA    Use as instructed    BLOOD-GLUCOSE TRANSMITTER (DEXCOM G6 TRANSMITTER) MARIELENA    Use as instructed    BORIC ACID (PH-D) 600 MG VAGINAL SUPPOSITORY    Place 1 suppository (600 mg total) vaginally every evening. As directed    FAMOTIDINE (PEPCID) 20 MG TABLET    TAKE 1 TABLET BY MOUTH TWICE DAILY AS NEEDED FOR STOMACH ACID OR REFLUX OR HEARTBURN    FLUTICASONE PROPIONATE (FLONASE) 50 MCG/ACTUATION NASAL SPRAY    2 sprays by Each Nostril route 2 (two) times daily.    HYDROXYZINE PAMOATE (VISTARIL) 25 MG CAP        HYOSCYAMINE (ANASPAZ,LEVSIN) 0.125 MG TAB    Take 1 tablet (125 mcg total) by mouth every 4 (four) hours as needed (stomach pain).    OXYBUTYNIN (DITROPAN XL) 15 MG TR24    Take 1 tablet (15 mg total) by mouth once daily.    OZEMPIC 2 MG/DOSE (8 MG/3 ML) PNIJ        PANTOPRAZOLE (PROTONIX) 40 MG TABLET        PROAIR HFA 90 MCG/ACTUATION INHALER    INL 2 PFS PO Q 4 H  "  Changed and/or Refilled Medications    Modified Medication Previous Medication    HYDROXYCHLOROQUINE (PLAQUENIL) 200 MG TABLET hydrOXYchloroQUINE (PLAQUENIL) 200 mg tablet       Take 1 tablet (200 mg total) by mouth once daily.    Take 1 tablet (200 mg total) by mouth once daily.    PREGABALIN (LYRICA) 150 MG CAPSULE pregabalin (LYRICA) 150 MG capsule       Take 1 capsule (150 mg total) by mouth 2 (two) times daily.    Take 1 capsule (150 mg total) by mouth 2 (two) times daily.   Discontinued Medications    DICLOFENAC (VOLTAREN) 50 MG EC TABLET    Take 1 tablet (50 mg total) by mouth 2 (two) times daily as needed (pain).    DULOXETINE (CYMBALTA) 30 MG CAPSULE    Take 1 capsule (30 mg total) by mouth once daily.    MELOXICAM (MOBIC) 7.5 MG TABLET    Take 1 tablet (7.5 mg total) by mouth once daily.    MISCELLANEOUS MEDICAL SUPPLY KIT    Auralgan drops for ear pain    VALACYCLOVIR (VALTREX) 1000 MG TABLET    Take 1 tablet (1,000 mg total) by mouth every 12 (twelve) hours. for 10 days         Objective:   /74   Pulse 77   Resp 18   Ht 4' 11" (1.499 m)   Wt 75.3 kg (165 lb 14.4 oz)   SpO2 99%   BMI 33.51 kg/m²   Physical Exam  Non-toxic appearance. No distress.   Normocephalic and atraumatic.   External ears normal.    Conjunctivae and EOM are normal. No scleral icterus.   RRR, No friction rub; palpable distal pulses.    No tachypnea or signs of respiratory distress.   Abdominal: No guarding or rebound tenderness.   Neurological: Oriented. Normal thought content.   Skin is warm. No pallor.   Musculoskeletal: see below for further input.     CT Abdomen Pelvis  Without Contrast                                RADIOLOGY REPORT        PT NAME: BECKI ESCOBAR      [facility]     : 1980 F 42             [address]    ACCT: [id number]                                              [facility] Rec #: [id number]                                        [id number]    Patient " Location: [place].RAD/             Procedure: CT ABD   PELVIS WO CONTRAST    REQUISITION #: [id number]      REPORT #: [id number]           DATE OF EXAM: 23    TIME OF EXAM: 0800           CMS MANDATED QUALITY DATA - CT RADIATION - 436        All CT scans at this facility use dose modulation, iterative-reconstruction,   and/or weight-based dosing when appropriate to reduce radiation dose to as   low as reasonably achievable.            HISTORY:Right upper quadrant pain (R 10.11) is the history provided.   Technologist provides additional history of right-sided abdominal pain x6   months with history of hysterectomy, cholecystectomy, and .        TECHNIQUE: Serial axial images were obtained from the domes of the diaphragm   to the pubic symphysis without intravenous contrast. Oral contrast was   administered. Coronal and sagittal reconstructions were performed. Patient   received 4.4 mSv of radiation exposure from this exam.        COMPARISON: Comparison to previous noncontrasted CT dated 2022.         FINDINGS: Diagnostic sensitivity limited due to lack of intravenous   contrast.    Lung bases: Lung bases are free of infiltrate or pleural effusion.        Liver: Noncontrasted liver appears normal in size and contour. Evidence of   previous cholecystectomy is seen.        Pancreas: Noncontrasted pancreas is unremarkable.        Spleen: Normal.        Adrenal glands: Unremarkable.        Kidneys and ureters: Noncontrasted kidneys appear normal in size and   contour. No renal calculi or hydronephrosis is seen. Ureters appear   nondilated.        Bladder: Bladder is decompressed. Uterus is not visualized consistent with   history of previous hysterectomy.        Bowel: Oral contrast is visualized in the nondistended stomach and proximal    to mid small bowel. No imaging evidence of appendiceal inflammation is seen.   Moderate volume of colonic fecal material is present. No bowel obstruction   is seen.         Peritoneum: No evidence of ascites or free air is seen.        Retroperitoneum: Unremarkable.        Lymph nodes: No evidence of abdominal or pelvic adenopathy is seen.        Abdominal wall: Unremarkable.        Bones: No acute bony abnormality seen.        IMPRESSION:    1. No acute intra-abdominal abnormality is identified. Stable interval   appearance.                        DICTATING PHYSICIAN:  DORI DAVISON JR., MD                   Date Dictated: 01/04/23 0924        Signed By:  DORI DAVISON JR., MD <Electronically signed by DORI DAVISON JR., MD in OV>    Date Signed:  01/04/23 0930     CC: RAYNA HAYS ; CEDRICK OLVERA      ADMITTING PHYSICIAN:                                                                                                    ORDERING PHY: RAYNA HAYS                                                                                                                                                      ATTENDING PHY: RAYNA HAYS    Patient Status:  REG CLI    Admit Service Date: 01/04/23         Office Visit on 12/05/2022   Component Date Value Ref Range Status    Color, UA 12/05/2022 Yellow   Final    pH, UA 12/05/2022 6.0   Final    WBC, UA 12/05/2022 Negative   Final    Nitrite, UA 12/05/2022 Negative   Final    Protein, POC 12/05/2022 Negative   Final    Glucose, UA 12/05/2022 Negative   Final    Ketones, UA 12/05/2022 Negative   Final    Urobilinogen, UA 12/05/2022 0.2   Final    Bilirubin, POC 12/05/2022 Negative   Final    Blood, UA 12/05/2022 Negative   Final    Clarity, UA 12/05/2022 Clear   Final    Spec Grav UA 12/05/2022 1.010   Final    Hemoglobin A1C 12/07/2022 6.0  4.0 - 6.0 % Final    EST AVERAGE GLUCOSE 12/07/2022 127 (H)  NORMAL MG/DL Final    Comment: NOTE  Testing performed at:  [facility], [address] [place] [address]  [address] CLIA #:[id number]      Lipase 12/07/2022 38  13 - 60 U/L Final    Comment: NOTE  Testing performed  at:  [facility], [address] [place] [address]  [address] CLIA #:[id number]      Multiple Orders 12/07/2022 SEE BELOW   Final    Comment: Your patient has submitted more than one order to us. The orders are  from different physicians. The different orders include some of the  same tests. You may receive results from tests you did not order.  Orders given to us in this manner are only submitted to insurance once  and a copy of results are sent to each physician. The resulting  process may take several days for all tests to be completed on the  separate accession numbers.  NOTE  Testing performed at:  [facility], [address] [place] [address]  [address] CLIA #:[id number]     Office Visit on 11/16/2022   Component Date Value Ref Range Status    POC Residual Urine Volume 11/16/2022 35  0 - 100 mL Final   Patient Outreach on 11/09/2022   Component Date Value Ref Range Status    Left Eye DM Retinopathy 11/08/2022 Negative   Final    Right Eye DM Retinopathy 11/08/2022 Negative   Final   Orders Only on 10/28/2022   Component Date Value Ref Range Status    CPK 10/28/2022 67  26 - 192 U/L Final    Comment: NOTE  Testing performed at:  [facility], [address] [place] [address]  [address] CLIA #:[id number]      Glucose 10/28/2022 96  74 - 106 mg/dL Final    BUN 10/28/2022 9.7  6 - 20 mg/dL Final    Creatinine 10/28/2022 0.62  0.50 - 0.90 mg/dL Final    AST 10/28/2022 26  0 - 32 U/L Final    ALT (SGPT) 10/28/2022 45 (H)  0 - 33 U/L Final    Alkaline Phosphatase 10/28/2022 91  35 - 105 U/L Final    Calcium 10/28/2022 9.4  8.6 - 10.2 mg/dL Final    Protein, Total 10/28/2022 7.3  6.4 - 8.3 g/dL Final    Albumin 10/28/2022 4.2  3.5 - 5.2 g/dL Final    BILIRUBIN, TOTAL 10/28/2022 0.27  0.00 - 1.20 mg/dL Final    Sodium 10/28/2022 139  136 - 145 mmol/L Final    Potassium 10/28/2022 4.0  3.5 - 5.1 mmol/L Final    Chloride 10/28/2022 102  98 - 107 mmol/L Final    CO2 10/28/2022 26  22 - 29 mmol/L  Final    Globulin 10/28/2022 3.1  1.5 - 4.5 g/dL Final    Albumin/Globulin Ratio 10/28/2022 1.4  1.0 - 2.7 Final    BUN/Creatinine Ratio 10/28/2022 15.6  6 - 20 Final    GFR ESTIMATION 10/28/2022 106.02  >60.00 Final    Anion Gap 10/28/2022 11.0  8.0 - 17.0 mmol/L Final    Comment: NOTE  Testing performed at:  [facility], [address] [place] [address] CLIA #:[id number]      CRP QUANTITATIVE 10/28/2022 3.8  <5.0 mg/L Final    Comment: Significantly decreased CRP values may be obtained from samples taken  from patients who have been treated with carboxypenicillins.      CRP QUALITATIVE 10/28/2022 NEGATIVE  NEGATIVE mg/L Final    Comment: NOTE  Testing performed at:  [facility], [address] [place] [address]  [address] CLIA #:[id number]      SED RATE (WESTBannerREN) 10/28/2022 17  0 - 20 mm/hr Final    Comment: NOTE  Testing performed at:  [facility], [address] [place] [address]  [address] CLIA #:[id number]      WBC 10/28/2022 6.79  4.3 - 10.8 X 10 3/ul Final    RBC 10/28/2022 4.30  4.2 - 5.4 X 10 6/ul Final    RDW-SD 10/28/2022 40.1  37 - 54 fl Final    Hemoglobin 10/28/2022 13.2  12 - 16 g/dL Final    Hematocrit 10/28/2022 38.8  37 - 47 % Final    MCV 10/28/2022 90.2  82 - 100 fl Final    MCH 10/28/2022 30.7  27 - 32 pg Final    MCHC 10/28/2022 34.0  32 - 36 g/dL Final    Platelets 10/28/2022 298  135 - 400 X 10 3/ul Final    Neutrophils 10/28/2022 43.5  34 - 71.1 % Final    Lymphocytes 10/28/2022 48.2  19.3 - 53.1 % Final    Monocytes 10/28/2022 6.9  4.7 - 12.5 % Final    Eosinophils 10/28/2022 0.7  0.7 - 7.0 % Final    Basophils 10/28/2022 0.4  0.2 - 1.2 % Final    Neutrophils Absolute 10/28/2022 2.95  2.15 - 7.56 X 10 3/ul Final    Lymphocytes Absolute 10/28/2022 3.27  0.86 - 4.75 X 10 3/ul Final    Monocytes Absolute 10/28/2022 0.47  0.22 - 1.08 X 10 3/ul Final    Eosinophils Absolute 10/28/2022 0.05  0.04 - 0.54 X 10 3/ul Final    Basophils Absolute 10/28/2022 0.03   0.00 - 0.22 X 10 3/ul Final    Immature Granulocytes Absolute 10/28/2022 0.02  0 - 0.04 X 10 3/ul Final    Immature Granulocytes 10/28/2022 0.3  0 - 0.5 % Final    IG includes metamyelocytes, myelocytes, and promyelocytes    nRBC# 10/28/2022 0.0  0 - 0.2 /100 WBC Final    nRBC Count Absolute 10/28/2022 0.000  0 - 0.012 x 10 3/ul Final    Comment: NOTE  Testing performed at:  [facility], [address] [place] [address]  [address] CLIA #:[id number]      Color, UA 10/28/2022 GREEN   Final    Clarity, UA 10/28/2022 HAZY   Final    Specific Gravity,UA 10/28/2022 1.015  1.005 - 1.030 Final    pH, Urine 10/28/2022 5  5 - 7.5 Final    Leukocytes, UA 10/28/2022 NEGATIVE  NEGATIVE Final    Nitrite, Urine 10/28/2022 NEGATIVE  NEGATIVE Final    Protein, UA 10/28/2022 NEGATIVE  NEGATIVE mg/dL Final    Glucose, UA 10/28/2022 NEGATIVE  NEGATIVE mg/dL Final    Ketones, UA 10/28/2022 NEGATIVE  NEGATIVE mg/dL Final    Urobilinogen, urine 10/28/2022 NORMAL  0 - 1.0 E.U./dL Final    Bilirubin (UA) 10/28/2022 NEGATIVE  NEGATIVE Final    Occult Blood 10/28/2022 NEGATIVE  NEGATIVE Final    WBC/HPF 10/28/2022 0-5  <5 Final    RBC/HPF 10/28/2022 0-5  <5 Final    Amorphous, UA 10/28/2022 NEGATIVE   Final    Bacteria, UA 10/28/2022 TRACE  NEG-TRACE Final    Epithelial Cells 10/28/2022 FEW  NEGATIVE-FEW Final    Mucus, UA 10/28/2022 NEGATIVE  NEGATIVE Final    Comment: NOTE  Testing performed at:  [facility], [address] [place] [address]  [address] CLIA #:[id number]      Rheumatoid Arthritis Factor 10/28/2022 POSITIVE (A)  NEGATIVE IU/mL Final    Rheumatoid Arthritis, Qn/Fluid 10/28/2022 18.4 (H)  <14.0 IU/mL Final    Comment: NOTE  Testing performed at:  [facility], [address] [place] [address] [place]  [address] IA #:[id number]     Patient Outreach on 10/13/2022   Component Date Value Ref Range Status    BCS Recommendation External 09/22/2022 Repeat mammogram in 1 year   Final   Office Visit on 07/26/2022    Component Date Value Ref Range Status    B12 07/27/2022 1,122  232 - 1,245 pg/mL Final    Comment: NOTE  Testing performed at:  [facility], [address] [place] [address] CLIA #:[id number]      Methylmalonic Acid 07/27/2022 108  87 - 318 nmol/L Final    Comment:   This test was developed and its analytical performance  characteristics have been determined by [facility]. It has not been  cleared or approved by FDA. This assay has been validated  pursuant to the CLIA regulations and is used for clinical  purposes.  NOTE  Testing performed at:  [facility], [address] [place], [place] [address] CLIA#:[id number]      WBC 07/27/2022 8.54  4.3 - 10.8 X 10 3/ul Final    RBC 07/27/2022 4.36  4.2 - 5.4 X 10 6/ul Final    RDW-SD 07/27/2022 42.4  37 - 54 fl Final    Hemoglobin 07/27/2022 13.3  12 - 16 g/dL Final    Hematocrit 07/27/2022 39.2  37 - 47 % Final    MCV 07/27/2022 89.9  82 - 100 fl Final    MCH 07/27/2022 30.5  27 - 32 pg Final    MCHC 07/27/2022 33.9  32 - 36 g/dL Final    Platelets 07/27/2022 292  135 - 400 X 10 3/ul Final    Neutrophils 07/27/2022 53.1  34 - 71.1 % Final    Lymphocytes 07/27/2022 38.6  19.3 - 53.1 % Final    Monocytes 07/27/2022 7.0  4.7 - 12.5 % Final    Eosinophils 07/27/2022 0.5 (L)  0.7 - 7.0 % Final    Basophils 07/27/2022 0.4  0.2 - 1.2 % Final    Neutrophils Absolute 07/27/2022 4.54  2.15 - 7.56 X 10 3/ul Final    Lymphocytes Absolute 07/27/2022 3.30  0.86 - 4.75 X 10 3/ul Final    Monocytes Absolute 07/27/2022 0.60  0.22 - 1.08 X 10 3/ul Final    Eosinophils Absolute 07/27/2022 0.04  0.04 - 0.54 X 10 3/ul Final    Basophils Absolute 07/27/2022 0.03  0.00 - 0.22 X 10 3/ul Final    Immature Granulocytes Absolute 07/27/2022 0.03  0 - 0.04 X 10 3/ul Final    Immature Granulocytes 07/27/2022 0.4  0 - 0.5 % Final    IG includes metamyelocytes, myelocytes, and promyelocytes    nRBC# 07/27/2022 0.0  0  - 0.2 /100 WBC Final    nRBC Count Absolute 07/27/2022 0.000  0 - 0.012 x 10 3/ul Final    Comment: NOTE  Testing performed at:  [facility], [address] [place] [address]  [address] CLIA #:[id number]      Additional Testing 07/27/2022 SEE BELOW   Final    Comment: Additional testing was collected and sent to a reference lab. Results  may take 7 days to 2 weeks before they are final. Reports will be sent  to ordering client via fax, pathviewer, mail, interface or   delivered.  NOTE  Testing performed at:  [facility], [address] [place] [address]  [address] CLIA #:[id number]     Patient Outreach on 07/18/2022   Component Date Value Ref Range Status    Cholesterol 07/14/2022 216 (H)  100 - 200 mg/dL Final    Triglycerides 07/14/2022 141  0 - 150 mg/dL Final    HDL 07/14/2022 50 (L)  >60 Final    LDL Cholesterol 07/14/2022 138 (H)  0 - 100 mg/dL Final    LDL/HDL Ratio 07/14/2022 2.8  1.0 - 3.0 Final    Hemoglobin A1C 06/01/2022 6.6 (H)  4.0 - 6.0 % Final    Glucose 06/01/2022 103  74 - 106 mg/dL Final    BUN 06/01/2022 9.9  6 - 20 mg/dL Final    Creatinine 06/01/2022 0.5  0.5 - 0.9 mg/dL Final    AST 06/01/2022 16  0 - 32 U/L Final    ALT 06/01/2022 28  0 - 33 U/L Final    Alkaline Phosphatase 06/01/2022 91  35 - 105 U/L Final    Calcium 06/01/2022 9.1  8.6 - 10.2 mg/dL Final    Total Protein 06/01/2022 7.6  6.4 - 8.3 g/dL Final    Albumin 06/01/2022 4.4  3.5 - 5.2 g/dL Final    Total Bilirubin 06/01/2022 0.4  0.0 - 1.2 mg/dL Final    Sodium 06/01/2022 138  136 - 145 mmol/L Final    Potassium 06/01/2022 3.8  3.5 - 5.1 mmol/L Final    Chloride 06/01/2022 104  98 - 107 mmol/L Final    CO2 06/01/2022 25  22 - 29 mmol/L Final    Globulin 06/01/2022 3.2  1.5 - 4.5 Final    Albumin/Globulin Ratio 06/01/2022 1.4  1.0 - 2.7 Final    BUN/CREAT RATIO 06/01/2022 18.7  6 - 20 Final    GFR ESTIMATION 06/01/2022 126.51  >60.00 Final    Anion Gap 06/01/2022 9  8 - 17 mmol/L Final   Office Visit on  07/18/2022   Component Date Value Ref Range Status    Color, UA 07/18/2022 Green/Blue   Final    pH, UA 07/18/2022 5.5   Final    WBC, UA 07/18/2022 Negative   Final    Nitrite, UA 07/18/2022 Negative   Final    Protein, POC 07/18/2022 Negative   Final    Glucose, UA 07/18/2022 Negative   Final    Ketones, UA 07/18/2022 Negative   Final    Urobilinogen, UA 07/18/2022 0.2   Final    Bilirubin, POC 07/18/2022 Negative   Final    Blood, UA 07/18/2022 Trace-Intact   Final    Clarity, UA 07/18/2022 Clear   Final    Spec Grav UA 07/18/2022 1.030   Final    Urine Culture, Routine 07/18/2022    Final    Comment: Source: URINE  Site:  Organism #1                    MIXED ORGANISMS, NO SUSCEPTIBILITY  Quantity                      25,000    Mixed ilene in a urine culture means there are several different types  of bacteria present, which is usually indicative of contamination of  the urine.  NOTE  Testing performed at:  [facility], [address] [place] [address] CLIA #:[id number]     Orders Only on 06/27/2022   Component Date Value Ref Range Status    POC Glucose 06/27/2022 113 (H)  70 - 105 Final   Orders Only on 06/24/2022   Component Date Value Ref Range Status    Specimen Collection Method 06/24/2022 Cl Catch Mid Stream   Final    Color, UA 06/24/2022 Christine (A)  Yellow Final    Appearance, UA 06/24/2022 Clear  Clear Final    pH, UA 06/24/2022 5.0  5.0 - 9.0 pH Final    Specific Gravity, UA 06/24/2022 1.020  1.000 - 1.030 Final    Protein, UA 06/24/2022 Negative  Negative mg/dL Final    Glucose, UA 06/24/2022 Normal  Normal mg/dL Final    Ketones, UA 06/24/2022 Negative  Negative mg/dL Final    Occult Blood UA 06/24/2022 10 (A)  Negative /uL Final    Nitrite, UA 06/24/2022 Negative  Negative Final    Bilirubin (UA) 06/24/2022 Negative  Negative mg/dL Final    Urobilinogen, UA 06/24/2022 Normal  Normal mg/dL Final    Leukocytes, UA 06/24/2022 Negative  Negative /uL Final    RBC, UA 06/24/2022 Rare  0 -  2 /HPF Final    WBC, UA 06/24/2022 Rare  0 - 5 /HPF Final    Squam Epithel, UA 06/24/2022 2+ Moderate  /LPF Final    Ca Oxalate Swetha, UA 06/24/2022 2+ Moderate (A)  None Seen /LPF Final    Bacteria 06/24/2022 1+ Few  Few/HPF /HPF Final    Service Comment 03 06/24/2022 No, Criteria Not Met   Final   There may be more visits with results that are not included.        All of the data above and below has been reviewed by myself and any further interpretations will be reflected in the assessment and plan.   The data includes review of external notes, and independent interpretation of lab results, x-rays, and imaging reports.  Active inflammatory arthritis is an illness that poses a threat to bodily function and even a threat to life in some cases.  Drug therapy to treat inflammatory arthritis usually requires intensive monitoring for toxicity.    Review of patient's allergies indicates:  No Known Allergies  Assessment/Plan:         Prev noted/prior plan:  (No overt synovitis     Verbal education     Likely mechanical and myofascial + neurogenic pain;  No overt active inflammatory arthritis but she had been on treatment MTX per patient     Blood work to further evaluate     We will seek records from Dr. Wright and records from [facility] at [facility]))     Visit prior to Last visit:     Interim lab:     HepBcoreAb neg     Creat 0.79; alb 4.9     AST 49  ALT 90       WBC 9.9; Hgb 14.1; plt 340     UA SG 1.02 moderate epi cells     C3 220  C4 29     SPEP beta little high alpha2 little high     CPK 52           JACOB neg RF 17.9+ CCP neg HepBsAb+sAgneg coreAb neg HCV neg uric acid 5.4     2022 TB negative     ESR 24; CRP 15.2;         Interim records from Dr. Wright some of note 5/26/22: history fibromyalgia and DJD; Opiates help with qol; no active synovitis; 14/18 trigger points; A/P: fibromyalgia and DJD; continue lyrica 150mg; RF+ ACR90 with Methotrexate; Gout is a  form of arthritis; Lab; revisit 3 months (I do see Methotrexate 2.5mg on a med list but nothing mentioned in the note other than what I wrote and gout also mentioned with patient education? Also was given depomedrol at some point? Cymbalta and lyrica noted; I don't see what opioid she was prescribed?)        Interim records from [facility] at [facility]: some of note 7/19/22  left elbow pain f/u; prior injection helped 2 months; will repeat left lateral epicondyle injection; revisit 6 months     8/3/22 MRI cervical: multilevel DJD DDD worse C5-6 C6-7        Prior plan/prev noted:   Lyrica 150mg bid   Prior voltaren 50mg we will resume   Prior skelaxin  Resume voltaren  Addendum 8/10/22 2:50pm: she called stating the lyrica no longer works for her so she is maybe going to stop it. Noted)           Went over lab including RF and LFTs     Went over records     She only used the MTX briefly with Dr. Wright less than a month and she tells me he stopped it     She did stop the lyrica completely interim and then resumed it recently again because it was helping with some pain in face her PCP told her was from shingles     She may be using the diclofenac?     She has some interim symptoms of inflammatory arthritis     No overt synovitis     Verbal education and some written     Add plaquenil 200mg daily with referral to Ophthalmology     liver US to further evaluate the LFTs     Cont/resume diclofenac 50mg bid prn     Cont lyrica 150mg bid     Cymbalta 20mg daily noted     Call if not improving or question     At the end of visit she asks about if she also stop Methotrexate? which I understood she was no longer using? and she clarified rather not using but not sure and telling me she needs more sleep noted     Revisit lab 2 weeks prior     Contact us prn        Last visit:     Interim lab 10/28/22:     CPK 87     AST 26 (prior 49)  ALT 45 (prior 90)     Creat 0.62; alb 4.2     WBC 6.8;  Hgb 13.2; plt 298     UA SG 1.015 few cells     RF18.4+     ESR 17; CRP 3.8     Interim liver US 9/30/22:   Liver: The liver demonstrates normal echogenicity with no focal lesions are    detected. The main portal vein demonstrates normal hepatopedal flow.      Gallbladder: Cholecystectomy change.     Biliary tree: The common bile duct measures a normal 2 mm in diameter.     Pancreas: The visualized pancreas appears normal.    Kidneys: The right kidney measures 11.3 x 5.3 x 4.8 cm with no   hydronephrosis.    Impression:    1.  No acute findings in the abdomen.  ))           Prev noted/prior plan:  (Prior plan/prev noted:   Lyrica 150mg bid   Prior voltaren 50mg we will resume   Prior skelaxin  Resume voltaren  Addendum 8/10/22 2:50pm: she called stating the lyrica no longer works for her so she is maybe going to stop it. Noted)  Went over lab including RF and LFTs  Went over records  She only used the MTX briefly with Dr. Wright less than a month and she tells me he stopped it  She did stop the lyrica completely interim and then resumed it recently again because it was helping with some pain in face her PCP told her was from shingles  She may be using the diclofenac?  She has some interim symptoms of inflammatory arthritis  No overt synovitis  Verbal education and some written  Add plaquenil 200mg daily with referral to Ophthalmology  liver US to further evaluate the LFTs  Cont/resume diclofenac 50mg bid prn  Cont lyrica 150mg bid  Cymbalta 20mg daily noted  Call if not improving or question   At the end of visit she asks about if she also stop Methotrexate? which I understood she was no longer using? and she clarified rather not using but not sure and telling me she needs more sleep noted)        She has interim pain in hands cervical lumbar and elbow     Looks like she used prednisone 20mg daily for 5 days weeks ago from PCP for interim back pain noted; She should also contact us if symptoms of inflammatory  arthritis in future     Looks like cymbalta increased 30mg interim noted     She tells me she is not sure about her meds noted; she could bring a list but I think we have updated things?     She can also call us moving forward; Jill has a direct number and can also contact me through the portal;      No overt synovitis but some interim prednisone noted; she tells me she had a lot of joint swelling prior to the prednisone noted     Verbal education  Went over lab     xrays to further evaluate at her convenience     She should call is recurs/persists  As per above     Will also keep close eye on her given interim reported history     Cont/refill meds:  Plaquenil 200mg daily  Diclofenac 50mg bid prn  Lyrica 150mg bid     Cymbalta 30mg noted     Revisit lab 2 weeks prior     Contact us prn      This visit:    Interim lab 12/21/22:    UA SG 1.022 light green 1+ turbid and trace protein; 5-10 WBC few epi cells    CPK 55    Creat 0.62; alb 4    WBC 7.4; Hgb 12.5; plt 298      ESR 19 CRP<0.5    Interim 12/21/22 xray reports:    Cervical normal  Lumbar: slight dextroscoliosis and mild DJD  CXR normal  Knees normal  Pelvis/hips: probable small bone island subtrochanteric region left femur  Hands: normal  Feet: small plantar and posterior calcaneal spurs        Prev noted/prior plan:  (She has interim pain in hands cervical lumbar and elbow   Looks like she used prednisone 20mg daily for 5 days weeks ago from PCP for interim back pain noted; She should also contact us if symptoms of inflammatory arthritis in future   Looks like cymbalta increased 30mg interim noted     She tells me she is not sure about her meds noted; she could bring a list but I think we have updated things?     She can also call us moving forward; Jill has a direct number and can also contact me through the portal;      No overt synovitis but some interim prednisone noted; she tells me she had a lot of joint swelling prior to the prednisone noted   Verbal  education  Went over lab   xrays to further evaluate at her convenience   She should call is recurs/persists  As per above   Will also keep close eye on her given interim reported history   Cont/refill meds:  Plaquenil 200mg daily  Diclofenac 50mg bid prn  Lyrica 150mg bid   Cymbalta 30mg noted)    She put herself on the schedule yesterday without getting lab or xrays or contacting us? We are here for her.    She had been working with PCP interim left ear pain was to revisit ENT; abdominal pain to have CT abdomen; some interim back pain mobic added to diclofenac (careful should not combine) and flexeril added; also had chest congestion cough and URI and was to have CXR per interim notes in Epic    She had missed after putting herself on schedule last month noted    She has some interim symptoms of inflammatory arthritis; she should call us we are here for her    She tells me she has the number but was not sure and then had trouble messaging me and Jill can help her moving forward    No longer using cymbalta it does not agree with her maybe caused her to 'hallucinate' that is now better noted    Her left foot and toes are most symptomatic     She does not feel the diclofenac helps at all noted    No overt synovitis but possibly some interim treatment? Tells me no recent interim steroids    Verbal education    Went over lab and xrays      Consider MRI left foot toes to further evaluate for inflammatory arthritis and the chronic pain    She should also update  about the spine pain and other    She will consider doing more PT; and her insurance may require her to    Change diclofenac to nabumetone 750mg bid prn    Lyrica 150mg bid     Plaquenil 200mg daily    Call us if not improving      Revisit lab 2 weeks prior    Contact us prn    Addendum 1/26/23: She called Jill letting her know the nabumetone does not agree with her causes her throat to close and difficult swallowing so she is avoiding stopping forward    JACOB  neg RF 17.9+ CCP neg HepBsAb+sAgneg coreAb neg HCV neg uric acid 5.4  There is some history to suggest inflammatory arthritis.     Rheumatoid arthritis and fibromyalgia per records     2022 TB negative     previously followed by Dr. Wright      She tells me Dr. Wright would not refill her diclofenac for unknown reason; no known kidney problem prev noted     She sounds like she was also started on MTX 4/week with folic acid that did not help so she stopped it but then she was back and forth about this previously noted     records from Dr. Wright some of note 5/26/22: history fibromyalgia and DJD; Opiates help with qol; no active synovitis; 14/18 trigger points; A/P: fibromyalgia and DJD; continue lyrica 150mg; RF+ ACR90 with Methotrexate; Gout is a form of arthritis; Lab; revisit 3 months   (I do see Methotrexate 2.5mg on a med list but nothing mentioned in the note other than what I wrote and gout also mentioned with patient education? Also was given depomedrol at some point? Cymbalta and lyrica noted; I don't see what opioid she was prescribed?)     records from [facility]: some of note 7/19/22  left elbow pain f/u; prior injection helped 2 months; will repeat left lateral epicondyle injection; revisit 6 months     8/3/22 MRI cervical: multilevel DJD DDD worse C5-6 C6-7      There is also some mechanical and neurogenic pain.     She has chronic numbness in head that she was told was from shingles on 4 different occasions and had treatment but never had a rash or skin lesion noted     Chronic neck and back pain     She tells me she is also working with [facility] Medicine [facility] Re[facility] if not already     Prior CHERI that did not help     Also had seen [facility] s/p elbow injection per patient     Prev noted/prior plan:  (No overt synovitis   Verbal education   Likely mechanical and myofascial + neurogenic pain;  No overt active inflammatory arthritis but she  had been on treatment MTX per patient   Blood work to further evaluate   We will seek records from Dr. Wright and records [place] [facility] [place] [facility]ren))      Has been managing with:     Lyrica 150mg bid     Prior voltaren 50mg we will resume     Prior skelaxin     Prior Methotrexate     Prior steroids oral and injections     Has been working with:     Records Tameka PHILIP some of note 7/27/22:  ((Follow-up (Patient here with c/o back pain, generalized body aches, and skin pain (burning sensation.//1. Back pain-awaiting authorization for MRI's/2. Generalized body aches-patient currently seeing Dr. Wright, but is just being given pain medications instead of a treatment plan per patient. She is seeing Cristobal in August to establish care./3. Skin pain-has been dx with shingles in the past with no lesions/rash. Verbalizes the pain is the same.), Back Pain, Generalized Body Aches, and burning sensation to skin     HPI   Patient is here today for follow-up and new complaints.     Patient reports burning sensation and stinging sensation over her skin.  Reports she has a history of fibromyalgia and RA.  She was seen by Dr. Wright in the past but has an upcoming appointment with Dr. Jain in August.  Patient currently takes Lyrica.     Neck and back pain-patient reports chronic pain and muscle tension in her neck and back.  She is awaiting approval for 2 are eyes.  She currently takes Mobic daily without relief.     Pt has completed 8 weeks of physical therapy for her neck and back without relief.       Assessment & Plan:      Neuropathic pain  -     Vitamin B12; Future; Expected date: 07/26/2022  -     Methylmalonic Acid, Serum; Future; Expected date: 07/26/2022  -     CBC Auto Differential; Future; Expected date: 07/26/2022     Rheumatoid arthritis involving multiple sites with positive rheumatoid factor  Comments:  Keep follow-up with Rheumatology     Neck pain  -      metaxalone (SKELAXIN) 800 MG tablet; Take 1 tablet (800 mg total) by mouth 3 (three) times daily. for 10 days  Dispense: 30 tablet; Refill: 0  -     diclofenac (VOLTAREN) 50 MG EC tablet; 1 tab 2-3 times a day with a meal  Dispense: 90 tablet; Refill: 1     Back pain, unspecified back location, unspecified back pain laterality, unspecified chronicity  -     metaxalone (SKELAXIN) 800 MG tablet; Take 1 tablet (800 mg total) by mouth 3 (three) times daily. for 10 days  Dispense: 30 tablet; Refill: 0  -     diclofenac (VOLTAREN) 50 MG EC tablet; 1 tab 2-3 times a day with a meal  Dispense: 90 tablet; Refill: 1))     Review of medical records as stated above.  Lab +/- imaging and other ordered diagnostic studies to further evaluate.  See the orders associated with this note visit.  Medications as prescribed as tolerated.    Education including verbal and those noted in the patient instructions.  Revisit as scheduled and call prn.    The following diagnoses influence medical decision making and/or need further workup including the orders listed below:    Rheumatoid arthritis involving multiple sites with positive rheumatoid factor  -     CBC Auto Differential; Future; Expected date: 02/01/2023  -     CK; Future; Expected date: 02/01/2023  -     Comprehensive Metabolic Panel; Future; Expected date: 02/01/2023  -     C-Reactive Protein; Future; Expected date: 02/01/2023  -     Sedimentation rate; Future; Expected date: 02/01/2023  -     Urinalysis; Future; Expected date: 02/01/2023  -     Rheumatoid Factor; Future; Expected date: 02/01/2023  -     hydrOXYchloroQUINE (PLAQUENIL) 200 mg tablet; Take 1 tablet (200 mg total) by mouth once daily.  Dispense: 30 tablet; Refill: 2  -     nabumetone (RELAFEN) 750 MG tablet; Take 1 tablet (750 mg total) by mouth 2 (two) times daily as needed for Pain (instead of diclofenac).  Dispense: 60 tablet; Refill: 2    Chronic toe pain, left foot  -     MRI Foot Toes W WO Contrast Left;  Future; Expected date: 01/11/2023  -     nabumetone (RELAFEN) 750 MG tablet; Take 1 tablet (750 mg total) by mouth 2 (two) times daily as needed for Pain (instead of diclofenac).  Dispense: 60 tablet; Refill: 2    Cervical spondylosis  -     CBC Auto Differential; Future; Expected date: 02/01/2023  -     CK; Future; Expected date: 02/01/2023  -     Comprehensive Metabolic Panel; Future; Expected date: 02/01/2023  -     C-Reactive Protein; Future; Expected date: 02/01/2023  -     Sedimentation rate; Future; Expected date: 02/01/2023  -     Urinalysis; Future; Expected date: 02/01/2023  -     Rheumatoid Factor; Future; Expected date: 02/01/2023  -     nabumetone (RELAFEN) 750 MG tablet; Take 1 tablet (750 mg total) by mouth 2 (two) times daily as needed for Pain (instead of diclofenac).  Dispense: 60 tablet; Refill: 2    Lumbar degenerative disc disease  -     CBC Auto Differential; Future; Expected date: 02/01/2023  -     CK; Future; Expected date: 02/01/2023  -     Comprehensive Metabolic Panel; Future; Expected date: 02/01/2023  -     C-Reactive Protein; Future; Expected date: 02/01/2023  -     Sedimentation rate; Future; Expected date: 02/01/2023  -     Urinalysis; Future; Expected date: 02/01/2023  -     Rheumatoid Factor; Future; Expected date: 02/01/2023  -     nabumetone (RELAFEN) 750 MG tablet; Take 1 tablet (750 mg total) by mouth 2 (two) times daily as needed for Pain (instead of diclofenac).  Dispense: 60 tablet; Refill: 2    Myalgia  -     CBC Auto Differential; Future; Expected date: 02/01/2023  -     CK; Future; Expected date: 02/01/2023  -     Comprehensive Metabolic Panel; Future; Expected date: 02/01/2023  -     C-Reactive Protein; Future; Expected date: 02/01/2023  -     Sedimentation rate; Future; Expected date: 02/01/2023  -     Urinalysis; Future; Expected date: 02/01/2023  -     Rheumatoid Factor; Future; Expected date: 02/01/2023  -     pregabalin (LYRICA) 150 MG capsule; Take 1 capsule (150 mg  total) by mouth 2 (two) times daily.  Dispense: 60 capsule; Refill: 2    Calcaneal spur of both feet  -     CBC Auto Differential; Future; Expected date: 02/01/2023  -     CK; Future; Expected date: 02/01/2023  -     Comprehensive Metabolic Panel; Future; Expected date: 02/01/2023  -     C-Reactive Protein; Future; Expected date: 02/01/2023  -     Sedimentation rate; Future; Expected date: 02/01/2023  -     Urinalysis; Future; Expected date: 02/01/2023  -     Rheumatoid Factor; Future; Expected date: 02/01/2023    Screening for rheumatic disorder  -     CBC Auto Differential; Future; Expected date: 02/01/2023  -     CK; Future; Expected date: 02/01/2023  -     Comprehensive Metabolic Panel; Future; Expected date: 02/01/2023  -     C-Reactive Protein; Future; Expected date: 02/01/2023  -     Sedimentation rate; Future; Expected date: 02/01/2023  -     Urinalysis; Future; Expected date: 02/01/2023  -     Rheumatoid Factor; Future; Expected date: 02/01/2023  -     MRI Foot Toes W WO Contrast Left; Future; Expected date: 01/11/2023    Medication monitoring encounter  -     CBC Auto Differential; Future; Expected date: 02/01/2023  -     CK; Future; Expected date: 02/01/2023  -     Comprehensive Metabolic Panel; Future; Expected date: 02/01/2023  -     C-Reactive Protein; Future; Expected date: 02/01/2023  -     Sedimentation rate; Future; Expected date: 02/01/2023  -     Urinalysis; Future; Expected date: 02/01/2023  -     Rheumatoid Factor; Future; Expected date: 02/01/2023    Neuralgia  -     pregabalin (LYRICA) 150 MG capsule; Take 1 capsule (150 mg total) by mouth 2 (two) times daily.  Dispense: 60 capsule; Refill: 2      Follow up in about 5 weeks (around 2/15/2023).     David Jain MD

## 2023-01-04 NOTE — PROGRESS NOTES
Results was given to patient/ she stated that she should repeat the CT scan when she is in pain to see if it will show something. She states that she is still having pain.

## 2023-01-11 ENCOUNTER — OFFICE VISIT (OUTPATIENT)
Dept: RHEUMATOLOGY | Facility: CLINIC | Age: 43
End: 2023-01-11
Payer: COMMERCIAL

## 2023-01-11 VITALS
DIASTOLIC BLOOD PRESSURE: 74 MMHG | WEIGHT: 165.88 LBS | RESPIRATION RATE: 18 BRPM | HEART RATE: 77 BPM | SYSTOLIC BLOOD PRESSURE: 110 MMHG | BODY MASS INDEX: 33.44 KG/M2 | OXYGEN SATURATION: 99 % | HEIGHT: 59 IN

## 2023-01-11 DIAGNOSIS — Z51.81 MEDICATION MONITORING ENCOUNTER: ICD-10-CM

## 2023-01-11 DIAGNOSIS — M79.2 NEURALGIA: ICD-10-CM

## 2023-01-11 DIAGNOSIS — Z13.89 SCREENING FOR RHEUMATIC DISORDER: ICD-10-CM

## 2023-01-11 DIAGNOSIS — G89.29 CHRONIC TOE PAIN, LEFT FOOT: ICD-10-CM

## 2023-01-11 DIAGNOSIS — M79.675 CHRONIC TOE PAIN, LEFT FOOT: ICD-10-CM

## 2023-01-11 DIAGNOSIS — M77.31 CALCANEAL SPUR OF BOTH FEET: ICD-10-CM

## 2023-01-11 DIAGNOSIS — M77.32 CALCANEAL SPUR OF BOTH FEET: ICD-10-CM

## 2023-01-11 DIAGNOSIS — M05.79 RHEUMATOID ARTHRITIS INVOLVING MULTIPLE SITES WITH POSITIVE RHEUMATOID FACTOR: Primary | ICD-10-CM

## 2023-01-11 DIAGNOSIS — M51.36 LUMBAR DEGENERATIVE DISC DISEASE: ICD-10-CM

## 2023-01-11 DIAGNOSIS — M47.812 CERVICAL SPONDYLOSIS: ICD-10-CM

## 2023-01-11 DIAGNOSIS — M79.10 MYALGIA: ICD-10-CM

## 2023-01-11 PROCEDURE — 3074F PR MOST RECENT SYSTOLIC BLOOD PRESSURE < 130 MM HG: ICD-10-PCS | Mod: CPTII,S$GLB,, | Performed by: INTERNAL MEDICINE

## 2023-01-11 PROCEDURE — 1160F RVW MEDS BY RX/DR IN RCRD: CPT | Mod: CPTII,S$GLB,, | Performed by: INTERNAL MEDICINE

## 2023-01-11 PROCEDURE — 3008F BODY MASS INDEX DOCD: CPT | Mod: CPTII,S$GLB,, | Performed by: INTERNAL MEDICINE

## 2023-01-11 PROCEDURE — 3008F PR BODY MASS INDEX (BMI) DOCUMENTED: ICD-10-PCS | Mod: CPTII,S$GLB,, | Performed by: INTERNAL MEDICINE

## 2023-01-11 PROCEDURE — 3074F SYST BP LT 130 MM HG: CPT | Mod: CPTII,S$GLB,, | Performed by: INTERNAL MEDICINE

## 2023-01-11 PROCEDURE — 1160F PR REVIEW ALL MEDS BY PRESCRIBER/CLIN PHARMACIST DOCUMENTED: ICD-10-PCS | Mod: CPTII,S$GLB,, | Performed by: INTERNAL MEDICINE

## 2023-01-11 PROCEDURE — 99214 OFFICE O/P EST MOD 30 MIN: CPT | Mod: S$GLB,,, | Performed by: INTERNAL MEDICINE

## 2023-01-11 PROCEDURE — 3078F PR MOST RECENT DIASTOLIC BLOOD PRESSURE < 80 MM HG: ICD-10-PCS | Mod: CPTII,S$GLB,, | Performed by: INTERNAL MEDICINE

## 2023-01-11 PROCEDURE — 1159F MED LIST DOCD IN RCRD: CPT | Mod: CPTII,S$GLB,, | Performed by: INTERNAL MEDICINE

## 2023-01-11 PROCEDURE — 99214 PR OFFICE/OUTPT VISIT, EST, LEVL IV, 30-39 MIN: ICD-10-PCS | Mod: S$GLB,,, | Performed by: INTERNAL MEDICINE

## 2023-01-11 PROCEDURE — 3078F DIAST BP <80 MM HG: CPT | Mod: CPTII,S$GLB,, | Performed by: INTERNAL MEDICINE

## 2023-01-11 PROCEDURE — 1159F PR MEDICATION LIST DOCUMENTED IN MEDICAL RECORD: ICD-10-PCS | Mod: CPTII,S$GLB,, | Performed by: INTERNAL MEDICINE

## 2023-01-11 RX ORDER — NABUMETONE 750 MG/1
750 TABLET, FILM COATED ORAL 2 TIMES DAILY PRN
Qty: 60 TABLET | Refills: 2 | Status: SHIPPED | OUTPATIENT
Start: 2023-01-11 | End: 2023-02-14 | Stop reason: ALTCHOICE

## 2023-01-11 RX ORDER — HYDROXYCHLOROQUINE SULFATE 200 MG/1
200 TABLET, FILM COATED ORAL DAILY
Qty: 30 TABLET | Refills: 2 | Status: SHIPPED | OUTPATIENT
Start: 2023-01-11 | End: 2023-02-14 | Stop reason: SDUPTHER

## 2023-01-11 RX ORDER — PREGABALIN 150 MG/1
150 CAPSULE ORAL 2 TIMES DAILY
Qty: 60 CAPSULE | Refills: 2 | Status: SHIPPED | OUTPATIENT
Start: 2023-01-11 | End: 2023-02-14 | Stop reason: SDUPTHER

## 2023-01-25 ENCOUNTER — OFFICE VISIT (OUTPATIENT)
Dept: UROLOGY | Facility: CLINIC | Age: 43
End: 2023-01-25
Payer: COMMERCIAL

## 2023-01-25 VITALS — DIASTOLIC BLOOD PRESSURE: 77 MMHG | HEART RATE: 84 BPM | SYSTOLIC BLOOD PRESSURE: 113 MMHG

## 2023-01-25 DIAGNOSIS — N30.10 INTERSTITIAL CYSTITIS: Primary | ICD-10-CM

## 2023-01-25 PROCEDURE — 3078F DIAST BP <80 MM HG: CPT | Mod: CPTII,S$GLB,, | Performed by: NURSE PRACTITIONER

## 2023-01-25 PROCEDURE — 1160F PR REVIEW ALL MEDS BY PRESCRIBER/CLIN PHARMACIST DOCUMENTED: ICD-10-PCS | Mod: CPTII,S$GLB,, | Performed by: NURSE PRACTITIONER

## 2023-01-25 PROCEDURE — 99213 OFFICE O/P EST LOW 20 MIN: CPT | Mod: S$GLB,,, | Performed by: NURSE PRACTITIONER

## 2023-01-25 PROCEDURE — 3074F SYST BP LT 130 MM HG: CPT | Mod: CPTII,S$GLB,, | Performed by: NURSE PRACTITIONER

## 2023-01-25 PROCEDURE — 3078F PR MOST RECENT DIASTOLIC BLOOD PRESSURE < 80 MM HG: ICD-10-PCS | Mod: CPTII,S$GLB,, | Performed by: NURSE PRACTITIONER

## 2023-01-25 PROCEDURE — 1159F PR MEDICATION LIST DOCUMENTED IN MEDICAL RECORD: ICD-10-PCS | Mod: CPTII,S$GLB,, | Performed by: NURSE PRACTITIONER

## 2023-01-25 PROCEDURE — 1159F MED LIST DOCD IN RCRD: CPT | Mod: CPTII,S$GLB,, | Performed by: NURSE PRACTITIONER

## 2023-01-25 PROCEDURE — 3074F PR MOST RECENT SYSTOLIC BLOOD PRESSURE < 130 MM HG: ICD-10-PCS | Mod: CPTII,S$GLB,, | Performed by: NURSE PRACTITIONER

## 2023-01-25 PROCEDURE — 1160F RVW MEDS BY RX/DR IN RCRD: CPT | Mod: CPTII,S$GLB,, | Performed by: NURSE PRACTITIONER

## 2023-01-25 PROCEDURE — 99213 PR OFFICE/OUTPT VISIT, EST, LEVL III, 20-29 MIN: ICD-10-PCS | Mod: S$GLB,,, | Performed by: NURSE PRACTITIONER

## 2023-01-25 RX ORDER — NITROFURANTOIN 25; 75 MG/1; MG/1
100 CAPSULE ORAL DAILY PRN
Qty: 30 CAPSULE | Refills: 2 | Status: SHIPPED | OUTPATIENT
Start: 2023-01-25 | End: 2023-02-13 | Stop reason: SDUPTHER

## 2023-01-25 RX ORDER — METHENAMINE, SODIUM PHOSPHATE, MONOBASIC, MONOHYDRATE, PHENYL SALICYLATE, METHYLENE BLUE, AND HYOSCYAMINE SULFATE 118; 40.8; 36; 10; .12 MG/1; MG/1; MG/1; MG/1; MG/1
CAPSULE ORAL
COMMUNITY
End: 2023-01-25

## 2023-01-25 NOTE — PROGRESS NOTES
Subjective:       Patient ID: Rosa Anguiano is a 42 y.o. female.    Chief Complaint: No chief complaint on file.      HPI: 42-year-old male female, established patient, last seen 2022.    Patient has history of interstitial cystitis.  She is maintained on IC 2 caps and oxybutynin.    At last visit patient was having flare ups.  Patient states today she is doing well.  She has recovered from her flare-up.    This time she is complaining of burning and pain with urination after intercourse.    She denies pain with intercourse.  The pain and burning occurs after intercourse.    Her  is uncircumcised.      This time she denies any pain or burning urination.  Denies any bladder spasms.  Denies any significant frequency or urgency.  Denies any blood in urine.    No other urinary complaints this time.       Past Medical History:   Past Medical History:   Diagnosis Date    Acute pelvic pain, female     Breast pain     Cystitis     Diabetes mellitus     Dyspareunia, female     Endometriosis     Fibromyalgia     GERD (gastroesophageal reflux disease)     IBS (irritable bowel syndrome)     Interstitial cystitis     Irregular menstrual cycle     Leukorrhea     Osteoarthritis     Ovarian cyst     Pneumonia     YAMILET (stress urinary incontinence, female)     Vaginal yeast infection     Vulvitis        Past Surgical Historical:   Past Surgical History:   Procedure Laterality Date     SECTION      CHOLECYSTECTOMY      LAPAROSCOPY-ASSISTED VAGINAL HYSTERECTOMY      TUBAL LIGATION          Medications:   Medication List with Changes/Refills   New Medications    METHEN-M.BLUE-S.PHOS-PHSAL-HYO (URIBEL) 118-10-40.8-36 MG CAP    Take 1 capsule by mouth 3 (three) times daily as needed (bladder pain).    NITROFURANTOIN, MACROCRYSTAL-MONOHYDRATE, (MACROBID) 100 MG CAPSULE    Take 1 capsule (100 mg total) by mouth daily as needed (post coital).   Current Medications    AMITRIPTYLINE (ELAVIL) 10 MG TABLET    Take 1 tablet  (10 mg total) by mouth nightly as needed for Insomnia.    BLOOD-GLUCOSE METER,CONTINUOUS (DEXCOM G6 ) MISC    Use as instructed    BLOOD-GLUCOSE SENSOR (DEXCOM G6 SENSOR) MARIELENA    Use as instructed    BLOOD-GLUCOSE TRANSMITTER (DEXCOM G6 TRANSMITTER) MARIELENA    Use as instructed    BORIC ACID (PH-D) 600 MG VAGINAL SUPPOSITORY    Place 1 suppository (600 mg total) vaginally every evening. As directed    FAMOTIDINE (PEPCID) 20 MG TABLET    TAKE 1 TABLET BY MOUTH TWICE DAILY AS NEEDED FOR STOMACH ACID OR REFLUX OR HEARTBURN    FLUTICASONE PROPIONATE (FLONASE) 50 MCG/ACTUATION NASAL SPRAY    2 sprays by Each Nostril route 2 (two) times daily.    HYDROXYCHLOROQUINE (PLAQUENIL) 200 MG TABLET    Take 1 tablet (200 mg total) by mouth once daily.    HYDROXYZINE PAMOATE (VISTARIL) 25 MG CAP        HYOSCYAMINE (ANASPAZ,LEVSIN) 0.125 MG TAB    Take 1 tablet (125 mcg total) by mouth every 4 (four) hours as needed (stomach pain).    NABUMETONE (RELAFEN) 750 MG TABLET    Take 1 tablet (750 mg total) by mouth 2 (two) times daily as needed for Pain (instead of diclofenac).    OXYBUTYNIN (DITROPAN XL) 15 MG TR24    Take 1 tablet (15 mg total) by mouth once daily.    OZEMPIC 2 MG/DOSE (8 MG/3 ML) PNIJ        PANTOPRAZOLE (PROTONIX) 40 MG TABLET        PREGABALIN (LYRICA) 150 MG CAPSULE    Take 1 capsule (150 mg total) by mouth 2 (two) times daily.    PROAIR HFA 90 MCG/ACTUATION INHALER    INL 2 PFS PO Q 4 H   Discontinued Medications    METHEN-M.BLUE-S.PHOS-PHSAL-HYO (URIBEL) 118-10-40.8-36 MG CAP    Take by mouth.        Past Social History:   Social History     Socioeconomic History    Marital status:    Tobacco Use    Smoking status: Never    Smokeless tobacco: Never   Substance and Sexual Activity    Alcohol use: Not Currently    Drug use: Never    Sexual activity: Yes     Partners: Male     Birth control/protection: See Surgical Hx     Comment: Hysterectomy       Allergies: Review of patient's allergies indicates:  No  Known Allergies     Family History:   Family History   Problem Relation Age of Onset    Colon cancer Father 60    Prostate cancer Father 60    Breast cancer Sister 66    Ovarian cancer Neg Hx     Uterine cancer Neg Hx     Melanoma Neg Hx     Pancreatic cancer Neg Hx         Review of Systems:  Review of Systems   Constitutional:  Negative for activity change and appetite change.   HENT:  Negative for congestion and dental problem.    Respiratory:  Negative for chest tightness and shortness of breath.    Cardiovascular:  Negative for chest pain.   Gastrointestinal:  Negative for abdominal distention.   Genitourinary:  Negative for decreased urine volume, difficulty urinating, dyspareunia, dysuria, enuresis, flank pain, frequency, genital sores, hematuria, pelvic pain and urgency.   Musculoskeletal:  Negative for back pain and neck pain.   Allergic/Immunologic: Negative for immunocompromised state.   Neurological:  Negative for dizziness.   Hematological:  Negative for adenopathy.   Psychiatric/Behavioral:  Negative for agitation, behavioral problems and confusion.      Physical Exam:  Physical Exam  Vitals and nursing note reviewed.   Constitutional:       Appearance: She is well-developed.   HENT:      Head: Normocephalic.   Cardiovascular:      Rate and Rhythm: Normal rate and regular rhythm.      Heart sounds: Normal heart sounds.   Pulmonary:      Effort: Pulmonary effort is normal.      Breath sounds: Normal breath sounds.   Abdominal:      General: Bowel sounds are normal.      Palpations: Abdomen is soft.   Skin:     General: Skin is warm and dry.   Neurological:      Mental Status: She is alert and oriented to person, place, and time.     Urinalysis:  Trace ketones, otherwise normal.    Assessment/Plan:   Interstitial cystitis:  Patient is doing well with no IC complaints at this time.    Patient continue IC 2 caps.  She will continue oxybutynin.    Patient provided Uribel has needed.      Patient does  complain of some pain after intercourse.  Will start patient on Macrobid 100 mg postcoital.    Will plan follow-up in 6 months, sooner if needed.  Problem List Items Addressed This Visit    None  Visit Diagnoses       Interstitial cystitis    -  Primary    Relevant Medications    methen-m.blue-s.phos-phsal-hyo (URIBEL) 118-10-40.8-36 mg Cap    Other Relevant Orders    POCT Urinalysis (w/Micro Option)

## 2023-01-26 ENCOUNTER — TELEPHONE (OUTPATIENT)
Dept: RHEUMATOLOGY | Facility: CLINIC | Age: 43
End: 2023-01-26
Payer: COMMERCIAL

## 2023-01-26 NOTE — TELEPHONE ENCOUNTER
Called and spoke to patient to let her know that Dr. Jain said for her to stop the Nabumetone if it's not agreeing with her. Patient voiced understanding.

## 2023-01-31 NOTE — PROGRESS NOTES
Subjective:      Patient ID: Rosa Anguiano is a 42 y.o. female.    Chief Complaint: Disease Management    HPI:   Includes joint pain with subjective swelling.   Antecedent event includes: None;  Pain location includes: elbows, legs, and feet;  Gradual onset; beginning >years ago;  Constant ache, Mild in severity,   Lasting >minutes, Worse at all times;   Improved with rest, medication, change in position, and none;   Worsened with overuse;         Review of Systems   Constitutional:  Negative for chills, fatigue and fever.   HENT:  Positive for sore throat. Negative for nasal congestion, ear pain, hearing loss, mouth dryness, mouth sores, nosebleeds and trouble swallowing.    Eyes:  Negative for photophobia, pain, redness, visual disturbance and eye dryness.   Respiratory:  Negative for cough and shortness of breath.    Cardiovascular:  Negative for chest pain, palpitations and leg swelling.   Gastrointestinal:  Negative for abdominal distention, abdominal pain, blood in stool, constipation, diarrhea, rectal pain, vomiting and fecal incontinence.   Endocrine: Negative for polydipsia and polyuria.   Genitourinary:  Positive for dysuria. Negative for bladder incontinence, enuresis, genital sores and hematuria.   Musculoskeletal:  Positive for arthralgias, joint swelling and myalgias.   Integumentary:  Negative for rash, wound and mole/lesion.   Neurological:  Negative for weakness and headaches.   Hematological:  Negative for adenopathy. Does not bruise/bleed easily.   Psychiatric/Behavioral:  Positive for sleep disturbance. Negative for dysphoric mood. The patient is not nervous/anxious.     Past Medical History:   Diagnosis Date    Acute pelvic pain, female     Breast pain     Cystitis     Diabetes mellitus     Dyspareunia, female     Endometriosis     Fibromyalgia     GERD (gastroesophageal reflux disease)     IBS (irritable bowel syndrome)     Interstitial cystitis     Irregular menstrual cycle     Leukorrhea      Osteoarthritis     Ovarian cyst     Pneumonia     YAMILET (stress urinary incontinence, female)     Vaginal yeast infection     Vulvitis      Past Surgical History:   Procedure Laterality Date     SECTION      CHOLECYSTECTOMY      LAPAROSCOPY-ASSISTED VAGINAL HYSTERECTOMY      TUBAL LIGATION        See the Assessment/Plan for further characterization of the HPI, ROS, Medical, Surgical, Family, and Social Histories including Tobacco use, Meds; all of which has been reviewed in Epic.    Medication List with Changes/Refills   New Medications    NAPROXEN (NAPROSYN) 500 MG TABLET    Take 1 tablet (500 mg total) by mouth 2 (two) times daily as needed (pain instead of nabumetone).   Current Medications    AMITRIPTYLINE (ELAVIL) 10 MG TABLET    Take 1 tablet (10 mg total) by mouth nightly as needed for Insomnia.    BLOOD-GLUCOSE METER,CONTINUOUS (DEXCOM G6 ) MISC    Use as instructed    BLOOD-GLUCOSE SENSOR (DEXCOM G6 SENSOR) MARIELENA    Use as instructed    BLOOD-GLUCOSE TRANSMITTER (DEXCOM G6 TRANSMITTER) MARIELENA    Use as instructed    BORIC ACID (PH-D) 600 MG VAGINAL SUPPOSITORY    Place 1 suppository (600 mg total) vaginally every evening. As directed    FAMOTIDINE (PEPCID) 20 MG TABLET    TAKE 1 TABLET BY MOUTH TWICE DAILY AS NEEDED FOR STOMACH ACID OR REFLUX OR HEARTBURN    FLUCONAZOLE (DIFLUCAN) 150 MG TAB    Take 1 tablet (150 mg total) by mouth once daily. for 1 day    FLUTICASONE PROPIONATE (FLONASE) 50 MCG/ACTUATION NASAL SPRAY    2 sprays by Each Nostril route 2 (two) times daily.    HYDROXYZINE PAMOATE (VISTARIL) 25 MG CAP        HYOSCYAMINE (ANASPAZ,LEVSIN) 0.125 MG TAB    Take 1 tablet (125 mcg total) by mouth every 4 (four) hours as needed (stomach pain).    SELAM-M.BLUE-S.PHOS-PHSAL-HYO (URIBEL) 118-10-40.8-36 MG CAP    Take 1 capsule by mouth 3 (three) times daily as needed (bladder pain).    NITROFURANTOIN, MACROCRYSTAL-MONOHYDRATE, (MACROBID) 100 MG CAPSULE    Take 1 capsule (100 mg total) by  "mouth daily as needed (post coital).    OXYBUTYNIN (DITROPAN XL) 15 MG TR24    Take 1 tablet (15 mg total) by mouth once daily.    OZEMPIC 2 MG/DOSE (8 MG/3 ML) PNIJ        PANTOPRAZOLE (PROTONIX) 40 MG TABLET        PROAIR HFA 90 MCG/ACTUATION INHALER    INL 2 PFS PO Q 4 H   Changed and/or Refilled Medications    Modified Medication Previous Medication    HYDROXYCHLOROQUINE (PLAQUENIL) 200 MG TABLET hydrOXYchloroQUINE (PLAQUENIL) 200 mg tablet       Take 1 tablet (200 mg total) by mouth once daily.    Take 1 tablet (200 mg total) by mouth once daily.    PREGABALIN (LYRICA) 150 MG CAPSULE pregabalin (LYRICA) 150 MG capsule       Take 1 capsule (150 mg total) by mouth 2 (two) times daily.    Take 1 capsule (150 mg total) by mouth 2 (two) times daily.   Discontinued Medications    NABUMETONE (RELAFEN) 750 MG TABLET    Take 1 tablet (750 mg total) by mouth 2 (two) times daily as needed for Pain (instead of diclofenac).         Objective:   /80   Pulse 90   Resp 16   Ht 4' 11" (1.499 m)   Wt 74.5 kg (164 lb 3.2 oz)   SpO2 99%   BMI 33.16 kg/m²   Physical Exam  Non-toxic appearance. No distress.   Normocephalic and atraumatic.   External ears normal.    Conjunctivae and EOM are normal. No scleral icterus.   RRR, No friction rub; palpable distal pulses.    No tachypnea or signs of respiratory distress.   Abdominal: No guarding or rebound tenderness.   Neurological: Oriented. Normal thought content.   Skin is warm. No pallor.   Musculoskeletal: see below for further input.    [facility] UNKNOWN RAD EAP                                RADIOLOGY REPORT        PT NAME: BECKI ESCOBAR      [facility]     : 1980 F 42             [address]    ACCT: [id number]                                              [facility] Rec #: [id number]                                        [id number]    Patient Location: [facility]/             Procedure: LOWER EXT FOOT WWO    REQUISITION #: " [id number]      REPORT #: [id number]           DATE OF EXAM: 01/31/23    TIME OF EXAM: 1030       MRI Foot        CLINICAL HISTORY: Left foot pain with numbness within the toes.        TECHNIQUE: Multiplanar, multisequential images are acquired through the left   foot before and after the administration of 15 cc ProHance gadolinium   contrast. No contrast discarded.        COMPARISON: None.        FINDINGS:        No abnormal bone marrow signal intensity within the foot. Specifically, no   evidence of acute fracture, osteonecrosis, or osteomyelitis.        The Lisfranc ligament is intact.        Please note that this examination was not tailored to assess for internal   arrangement at the level of the ankle though the visualized tendinous and   ligamentous structures are grossly intact.        IMPRESSION:        No MR abnormality of the left foot.        DICTATING PHYSICIAN:  JUDY GRADY MD                   Date Dictated: 01/31/23 1222        Signed By:  JUDY GRADY MD <Electronically signed by JUDY GRADY MD in    OV>    Date Signed:  01/31/23 1226     CC: SKYE DENNISON MD ; SKYE DENNISON MD      ADMITTING PHYSICIAN:                                                                                                    ORDERING PHY: SKYE DENNISON MD                                                                                                                                                      ATTENDING PHY: SKYE DENNISON MD    Patient Status:  REG CLI    Admit Service Date: 01/31/23         Office Visit on 12/05/2022   Component Date Value Ref Range Status    Color, UA 12/05/2022 Yellow   Final    pH, UA 12/05/2022 6.0   Final    WBC, UA 12/05/2022 Negative   Final    Nitrite, UA 12/05/2022 Negative   Final    Protein, POC 12/05/2022 Negative   Final    Glucose, UA 12/05/2022 Negative   Final    Ketones, UA 12/05/2022 Negative   Final    Urobilinogen, UA 12/05/2022 0.2   Final    Bilirubin, POC 12/05/2022  Negative   Final    Blood, UA 12/05/2022 Negative   Final    Clarity, UA 12/05/2022 Clear   Final    Spec Grav UA 12/05/2022 1.010   Final    Hemoglobin A1C 12/07/2022 6.0  4.0 - 6.0 % Final    EST AVERAGE GLUCOSE 12/07/2022 127 (H)  NORMAL MG/DL Final    Comment: NOTE  Testing performed at:  [facility], [address] [place] [address]  [address] CLIA #:[id number]      Lipase 12/07/2022 38  13 - 60 U/L Final    Comment: NOTE  Testing performed at:  [facility], [address] [place] [address]  [address] CLIA #:[id number]      Multiple Orders 12/07/2022 SEE BELOW   Final    Comment: Your patient has submitted more than one order to us. The orders are  from different physicians. The different orders include some of the  same tests. You may receive results from tests you did not order.  Orders given to us in this manner are only submitted to insurance once  and a copy of results are sent to each physician. The resulting  process may take several days for all tests to be completed on the  separate accession numbers.  NOTE  Testing performed at:  [facility], [address] [place] [address]  [address] CLIA #:[id number]     Office Visit on 11/16/2022   Component Date Value Ref Range Status    POC Residual Urine Volume 11/16/2022 35  0 - 100 mL Final   Patient Outreach on 11/09/2022   Component Date Value Ref Range Status    Left Eye DM Retinopathy 11/08/2022 Negative   Final    Right Eye DM Retinopathy 11/08/2022 Negative   Final   Orders Only on 10/28/2022   Component Date Value Ref Range Status    CPK 10/28/2022 67  26 - 192 U/L Final    Comment: NOTE  Testing performed at:  [facility], [address] [place] [address]  [address] CLIA #:[id number]      Glucose 10/28/2022 96  74 - 106 mg/dL Final    BUN 10/28/2022 9.7  6 - 20 mg/dL Final    Creatinine 10/28/2022 0.62  0.50 - 0.90 mg/dL Final    AST 10/28/2022 26  0 - 32 U/L Final    ALT (SGPT) 10/28/2022 45 (H)  0 - 33 U/L Final     Alkaline Phosphatase 10/28/2022 91  35 - 105 U/L Final    Calcium 10/28/2022 9.4  8.6 - 10.2 mg/dL Final    Protein, Total 10/28/2022 7.3  6.4 - 8.3 g/dL Final    Albumin 10/28/2022 4.2  3.5 - 5.2 g/dL Final    BILIRUBIN, TOTAL 10/28/2022 0.27  0.00 - 1.20 mg/dL Final    Sodium 10/28/2022 139  136 - 145 mmol/L Final    Potassium 10/28/2022 4.0  3.5 - 5.1 mmol/L Final    Chloride 10/28/2022 102  98 - 107 mmol/L Final    CO2 10/28/2022 26  22 - 29 mmol/L Final    Globulin 10/28/2022 3.1  1.5 - 4.5 g/dL Final    Albumin/Globulin Ratio 10/28/2022 1.4  1.0 - 2.7 Final    BUN/Creatinine Ratio 10/28/2022 15.6  6 - 20 Final    GFR ESTIMATION 10/28/2022 106.02  >60.00 Final    Anion Gap 10/28/2022 11.0  8.0 - 17.0 mmol/L Final    Comment: NOTE  Testing performed at:  [facility], [address] [place] [address] CLIA #:[id number]      CRP QUANTITATIVE 10/28/2022 3.8  <5.0 mg/L Final    Comment: Significantly decreased CRP values may be obtained from samples taken  from patients who have been treated with carboxypenicillins.      CRP QUALITATIVE 10/28/2022 NEGATIVE  NEGATIVE mg/L Final    Comment: NOTE  Testing performed at:  [facility], [address] [place] [address]  [address] CLIA #:[id number]      SED RATE (WESTERGREN) 10/28/2022 17  0 - 20 mm/hr Final    Comment: NOTE  Testing performed at:  [facility], [address] [place] [address]  [address] CLIA #:[id number]      WBC 10/28/2022 6.79  4.3 - 10.8 X 10 3/ul Final    RBC 10/28/2022 4.30  4.2 - 5.4 X 10 6/ul Final    RDW-SD 10/28/2022 40.1  37 - 54 fl Final    Hemoglobin 10/28/2022 13.2  12 - 16 g/dL Final    Hematocrit 10/28/2022 38.8  37 - 47 % Final    MCV 10/28/2022 90.2  82 - 100 fl Final    MCH 10/28/2022 30.7  27 - 32 pg Final    MCHC 10/28/2022 34.0  32 - 36 g/dL Final    Platelets 10/28/2022 298  135 - 400 X 10 3/ul Final    Neutrophils 10/28/2022 43.5  34 - 71.1 % Final    Lymphocytes 10/28/2022 48.2  19.3 - 53.1 % Final     Monocytes 10/28/2022 6.9  4.7 - 12.5 % Final    Eosinophils 10/28/2022 0.7  0.7 - 7.0 % Final    Basophils 10/28/2022 0.4  0.2 - 1.2 % Final    Neutrophils Absolute 10/28/2022 2.95  2.15 - 7.56 X 10 3/ul Final    Lymphocytes Absolute 10/28/2022 3.27  0.86 - 4.75 X 10 3/ul Final    Monocytes Absolute 10/28/2022 0.47  0.22 - 1.08 X 10 3/ul Final    Eosinophils Absolute 10/28/2022 0.05  0.04 - 0.54 X 10 3/ul Final    Basophils Absolute 10/28/2022 0.03  0.00 - 0.22 X 10 3/ul Final    Immature Granulocytes Absolute 10/28/2022 0.02  0 - 0.04 X 10 3/ul Final    Immature Granulocytes 10/28/2022 0.3  0 - 0.5 % Final    IG includes metamyelocytes, myelocytes, and promyelocytes    nRBC# 10/28/2022 0.0  0 - 0.2 /100 WBC Final    nRBC Count Absolute 10/28/2022 0.000  0 - 0.012 x 10 3/ul Final    Comment: NOTE  Testing performed at:  [facility], [address] [place] [address] CLIA #:[id number]      Color, UA 10/28/2022 GREEN   Final    Clarity, UA 10/28/2022 HAZY   Final    Specific Gravity,UA 10/28/2022 1.015  1.005 - 1.030 Final    pH, Urine 10/28/2022 5  5 - 7.5 Final    Leukocytes, UA 10/28/2022 NEGATIVE  NEGATIVE Final    Nitrite, Urine 10/28/2022 NEGATIVE  NEGATIVE Final    Protein, UA 10/28/2022 NEGATIVE  NEGATIVE mg/dL Final    Glucose, UA 10/28/2022 NEGATIVE  NEGATIVE mg/dL Final    Ketones, UA 10/28/2022 NEGATIVE  NEGATIVE mg/dL Final    Urobilinogen, urine 10/28/2022 NORMAL  0 - 1.0 E.U./dL Final    Bilirubin (UA) 10/28/2022 NEGATIVE  NEGATIVE Final    Occult Blood 10/28/2022 NEGATIVE  NEGATIVE Final    WBC/HPF 10/28/2022 0-5  <5 Final    RBC/HPF 10/28/2022 0-5  <5 Final    Amorphous, UA 10/28/2022 NEGATIVE   Final    Bacteria, UA 10/28/2022 TRACE  NEG-TRACE Final    Epithelial Cells 10/28/2022 FEW  NEGATIVE-FEW Final    Mucus, UA 10/28/2022 NEGATIVE  NEGATIVE Final    Comment: NOTE  Testing performed at:  [facility], [address] [place] [address] CLIA #:[id number]       Rheumatoid Arthritis Factor 10/28/2022 POSITIVE (A)  NEGATIVE IU/mL Final    Rheumatoid Arthritis, Qn/Fluid 10/28/2022 18.4 (H)  <14.0 IU/mL Final    Comment: NOTE  Testing performed at:  [facility], [address] [place] [address]  [address] CLIA #:[id number]     Patient Outreach on 10/13/2022   Component Date Value Ref Range Status    BCS Recommendation External 09/22/2022 Repeat mammogram in 1 year   Final   Office Visit on 07/26/2022   Component Date Value Ref Range Status    B12 07/27/2022 1,122  232 - 1,245 pg/mL Final    Comment: NOTE  Testing performed at:  [facility], [address] [place] [address]  [address] CLIA #:[id number]      Methylmalonic Acid 07/27/2022 108  87 - 318 nmol/L Final    Comment:   This test was developed and its analytical performance  characteristics have been determined by [employer]  [facility]. It has not been  cleared or approved by FDA. This assay has been validated  pursuant to the CLIA regulations and is used for clinical  purposes.  NOTE  Testing performed at:  [employer] [facility], [address] [place] [address] CLIA#:[id number]      WBC 07/27/2022 8.54  4.3 - 10.8 X 10 3/ul Final    RBC 07/27/2022 4.36  4.2 - 5.4 X 10 6/ul Final    RDW-SD 07/27/2022 42.4  37 - 54 fl Final    Hemoglobin 07/27/2022 13.3  12 - 16 g/dL Final    Hematocrit 07/27/2022 39.2  37 - 47 % Final    MCV 07/27/2022 89.9  82 - 100 fl Final    MCH 07/27/2022 30.5  27 - 32 pg Final    MCHC 07/27/2022 33.9  32 - 36 g/dL Final    Platelets 07/27/2022 292  135 - 400 X 10 3/ul Final    Neutrophils 07/27/2022 53.1  34 - 71.1 % Final    Lymphocytes 07/27/2022 38.6  19.3 - 53.1 % Final    Monocytes 07/27/2022 7.0  4.7 - 12.5 % Final    Eosinophils 07/27/2022 0.5 (L)  0.7 - 7.0 % Final    Basophils 07/27/2022 0.4  0.2 - 1.2 % Final    Neutrophils Absolute 07/27/2022 4.54  2.15 - 7.56 X 10 3/ul Final    Lymphocytes Absolute 07/27/2022 3.30   0.86 - 4.75 X 10 3/ul Final    Monocytes Absolute 07/27/2022 0.60  0.22 - 1.08 X 10 3/ul Final    Eosinophils Absolute 07/27/2022 0.04  0.04 - 0.54 X 10 3/ul Final    Basophils Absolute 07/27/2022 0.03  0.00 - 0.22 X 10 3/ul Final    Immature Granulocytes Absolute 07/27/2022 0.03  0 - 0.04 X 10 3/ul Final    Immature Granulocytes 07/27/2022 0.4  0 - 0.5 % Final    IG includes metamyelocytes, myelocytes, and promyelocytes    nRBC# 07/27/2022 0.0  0 - 0.2 /100 WBC Final    nRBC Count Absolute 07/27/2022 0.000  0 - 0.012 x 10 3/ul Final    Comment: NOTE  Testing performed at:  [facility], [address] [place] [address]  [address] CLIA #:[id number]      Additional Testing 07/27/2022 SEE BELOW   Final    Comment: Additional testing was collected and sent to a reference lab. Results  may take 7 days to 2 weeks before they are final. Reports will be sent  to ordering client via fax, pathviewer, mail, interface or   delivered.  NOTE  Testing performed at:  [facility], [address] [place] [address]  [address] CLIA #:[id number]     Patient Outreach on 07/18/2022   Component Date Value Ref Range Status    Cholesterol 07/14/2022 216 (H)  100 - 200 mg/dL Final    Triglycerides 07/14/2022 141  0 - 150 mg/dL Final    HDL 07/14/2022 50 (L)  >60 Final    LDL Cholesterol 07/14/2022 138 (H)  0 - 100 mg/dL Final    LDL/HDL Ratio 07/14/2022 2.8  1.0 - 3.0 Final    Hemoglobin A1C 06/01/2022 6.6 (H)  4.0 - 6.0 % Final    Glucose 06/01/2022 103  74 - 106 mg/dL Final    BUN 06/01/2022 9.9  6 - 20 mg/dL Final    Creatinine 06/01/2022 0.5  0.5 - 0.9 mg/dL Final    AST 06/01/2022 16  0 - 32 U/L Final    ALT 06/01/2022 28  0 - 33 U/L Final    Alkaline Phosphatase 06/01/2022 91  35 - 105 U/L Final    Calcium 06/01/2022 9.1  8.6 - 10.2 mg/dL Final    Total Protein 06/01/2022 7.6  6.4 - 8.3 g/dL Final    Albumin 06/01/2022 4.4  3.5 - 5.2 g/dL Final    Total Bilirubin 06/01/2022 0.4  0.0 - 1.2 mg/dL Final    Sodium  06/01/2022 138  136 - 145 mmol/L Final    Potassium 06/01/2022 3.8  3.5 - 5.1 mmol/L Final    Chloride 06/01/2022 104  98 - 107 mmol/L Final    CO2 06/01/2022 25  22 - 29 mmol/L Final    Globulin 06/01/2022 3.2  1.5 - 4.5 Final    Albumin/Globulin Ratio 06/01/2022 1.4  1.0 - 2.7 Final    BUN/CREAT RATIO 06/01/2022 18.7  6 - 20 Final    GFR ESTIMATION 06/01/2022 126.51  >60.00 Final    Anion Gap 06/01/2022 9  8 - 17 mmol/L Final   Office Visit on 07/18/2022   Component Date Value Ref Range Status    Color, UA 07/18/2022 Green/Blue   Final    pH, UA 07/18/2022 5.5   Final    WBC, UA 07/18/2022 Negative   Final    Nitrite, UA 07/18/2022 Negative   Final    Protein, POC 07/18/2022 Negative   Final    Glucose, UA 07/18/2022 Negative   Final    Ketones, UA 07/18/2022 Negative   Final    Urobilinogen, UA 07/18/2022 0.2   Final    Bilirubin, POC 07/18/2022 Negative   Final    Blood, UA 07/18/2022 Trace-Intact   Final    Clarity, UA 07/18/2022 Clear   Final    Spec Grav UA 07/18/2022 1.030   Final    Urine Culture, Routine 07/18/2022    Final    Comment: Source: URINE  Site:  Organism #1                    MIXED ORGANISMS, NO SUSCEPTIBILITY  Quantity                      25,000    Mixed ilene in a urine culture means there are several different types  of bacteria present, which is usually indicative of contamination of  the urine.  NOTE  Testing performed at:  [facility], [address] [place] [address]  [address] CLIA #:[id number]     Orders Only on 06/27/2022   Component Date Value Ref Range Status    POC Glucose 06/27/2022 113 (H)  70 - 105 Final   Orders Only on 06/24/2022   Component Date Value Ref Range Status    Specimen Collection Method 06/24/2022 Cl Catch Mid Stream   Final    Color, UA 06/24/2022 Christine (A)  Yellow Final    Appearance, UA 06/24/2022 Clear  Clear Final    pH, UA 06/24/2022 5.0  5.0 - 9.0 pH Final    Specific Gravity, UA 06/24/2022 1.020  1.000 - 1.030 Final    Protein, UA 06/24/2022  Negative  Negative mg/dL Final    Glucose, UA 06/24/2022 Normal  Normal mg/dL Final    Ketones, UA 06/24/2022 Negative  Negative mg/dL Final    Occult Blood UA 06/24/2022 10 (A)  Negative /uL Final    Nitrite, UA 06/24/2022 Negative  Negative Final    Bilirubin (UA) 06/24/2022 Negative  Negative mg/dL Final    Urobilinogen, UA 06/24/2022 Normal  Normal mg/dL Final    Leukocytes, UA 06/24/2022 Negative  Negative /uL Final    RBC, UA 06/24/2022 Rare  0 - 2 /HPF Final    WBC, UA 06/24/2022 Rare  0 - 5 /HPF Final    Squam Epithel, UA 06/24/2022 2+ Moderate  /LPF Final    Ca Oxalate Swetha, UA 06/24/2022 2+ Moderate (A)  None Seen /LPF Final    Bacteria 06/24/2022 1+ Few  Few/HPF /HPF Final    Service Comment 03 06/24/2022 No, Criteria Not Met   Final   There may be more visits with results that are not included.        All of the data above and below has been reviewed by myself and any further interpretations will be reflected in the assessment and plan.   The data includes review of external notes, and independent interpretation of lab results, x-rays, and imaging reports.  Active inflammatory arthritis is an illness that poses a threat to bodily function and even a threat to life in some cases.  Drug therapy to treat inflammatory arthritis usually requires intensive monitoring for toxicity.    Review of patient's allergies indicates:  No Known Allergies  Assessment/Plan:          Prev noted/prior plan:  (No overt synovitis     Verbal education     Likely mechanical and myofascial + neurogenic pain;  No overt active inflammatory arthritis but she had been on treatment MTX per patient     Blood work to further evaluate     We will seek records from Dr. Wright and records from [facility] at [facility]     Resume voltaren))     Visit prior to Visit prior to Last visit:     Interim lab:     HepBcoreAb neg     Creat 0.79; alb 4.9     AST 49  ALT 90       WBC 9.9; Hgb 14.1; plt 340     UA SG  1.02 moderate epi cells     C3 220  C4 29     SPEP beta little high alpha2 little high     CPK 52           JACOB neg RF 17.9+ CCP neg HepBsAb+sAgneg coreAb neg HCV neg uric acid 5.4     2022 TB negative     ESR 24; CRP 15.2;         Interim records from Dr. Wright some of note 5/26/22: history fibromyalgia and DJD; Opiates help with qol; no active synovitis; 14/18 trigger points; A/P: fibromyalgia and DJD; continue lyrica 150mg; RF+ ACR90 with Methotrexate; Gout is a form of arthritis; Lab; revisit 3 months (I do see Methotrexate 2.5mg on a med list but nothing mentioned in the note other than what I wrote and gout also mentioned with patient education? Also was given depomedrol at some point? Cymbalta and lyrica noted; I don't see what opioid she was prescribed?)        Interim [place] [facility] [place] [facility] some of note 7/19/22  left elbow pain f/u; prior injection helped 2 months; will repeat left lateral epicondyle injection; revisit 6 months     8/3/22 MRI cervical: multilevel DJD DDD worse C5-6 C6-7        Prior plan/prev noted:   Lyrica 150mg bid   Prior voltaren 50mg we will resume   Prior skelaxin  Resume voltaren  Addendum 8/10/22 2:50pm: she called stating the lyrica no longer works for her so she is maybe going to stop it. Noted)           Went over lab including RF and LFTs     Went over records     She only used the MTX briefly with Dr. Wright less than a month and she tells me he stopped it     She did stop the lyrica completely interim and then resumed it recently again because it was helping with some pain in face her PCP told her was from shingles     She may be using the diclofenac?     She has some interim symptoms of inflammatory arthritis     No overt synovitis     Verbal education and some written     Add plaquenil 200mg daily with referral to Ophthalmology     liver US to further evaluate the LFTs     Cont/resume diclofenac 50mg bid prn     Cont  lyrica 150mg bid     Cymbalta 20mg daily noted     Call if not improving or question     At the end of visit she asks about if she also stop Methotrexate? which I understood she was no longer using? and she clarified rather not using but not sure and telling me she needs more sleep noted     Revisit lab 2 weeks prior     Contact us prn        Visit prior to Last visit:     Interim lab 10/28/22:     CPK 87     AST 26 (prior 49)  ALT 45 (prior 90)     Creat 0.62; alb 4.2     WBC 6.8; Hgb 13.2; plt 298     UA SG 1.015 few cells     RF18.4+     ESR 17; CRP 3.8     Interim liver US 9/30/22:   Liver: The liver demonstrates normal echogenicity with no focal lesions are    detected. The main portal vein demonstrates normal hepatopedal flow.      Gallbladder: Cholecystectomy change.     Biliary tree: The common bile duct measures a normal 2 mm in diameter.     Pancreas: The visualized pancreas appears normal.    Kidneys: The right kidney measures 11.3 x 5.3 x 4.8 cm with no   hydronephrosis.    Impression:    1.  No acute findings in the abdomen.  ))           Prev noted/prior plan:  (Prior plan/prev noted:   Lyrica 150mg bid   Prior voltaren 50mg we will resume   Prior skelaxin  Resume voltaren  Addendum 8/10/22 2:50pm: she called stating the lyrica no longer works for her so she is maybe going to stop it. Noted)  Went over lab including RF and LFTs  Went over records  She only used the MTX briefly with Dr. Wright less than a month and she tells me he stopped it  She did stop the lyrica completely interim and then resumed it recently again because it was helping with some pain in face her PCP told her was from shingles  She may be using the diclofenac?  She has some interim symptoms of inflammatory arthritis  No overt synovitis  Verbal education and some written  Add plaquenil 200mg daily with referral to Ophthalmology  liver US to further evaluate the LFTs  Cont/resume diclofenac 50mg bid prn  Cont lyrica 150mg  bid  Cymbalta 20mg daily noted  Call if not improving or question   At the end of visit she asks about if she also stop Methotrexate? which I understood she was no longer using? and she clarified rather not using but not sure and telling me she needs more sleep noted)        She has interim pain in hands cervical lumbar and elbow     Looks like she used prednisone 20mg daily for 5 days weeks ago from PCP for interim back pain noted; She should also contact us if symptoms of inflammatory arthritis in future     Looks like cymbalta increased 30mg interim noted     She tells me she is not sure about her meds noted; she could bring a list but I think we have updated things?     She can also call us moving forward; Jill has a direct number and can also contact me through the portal;      No overt synovitis but some interim prednisone noted; she tells me she had a lot of joint swelling prior to the prednisone noted     Verbal education  Went over lab     xrays to further evaluate at her convenience     She should call is recurs/persists  As per above     Will also keep close eye on her given interim reported history     Cont/refill meds:  Plaquenil 200mg daily  Diclofenac 50mg bid prn  Lyrica 150mg bid     Cymbalta 30mg noted     Revisit lab 2 weeks prior     Contact us prn      Last visit:     Interim lab 12/21/22:     UA SG 1.022 light green 1+ turbid and trace protein; 5-10 WBC few epi cells     CPK 55     Creat 0.62; alb 4     WBC 7.4; Hgb 12.5; plt 298        ESR 19 CRP<0.5     Interim 12/21/22 xray reports:     Cervical normal  Lumbar: slight dextroscoliosis and mild DJD  CXR normal  Knees normal  Pelvis/hips: probable small bone island subtrochanteric region left femur  Hands: normal  Feet: small plantar and posterior calcaneal spurs           Prev noted/prior plan:  (She has interim pain in hands cervical lumbar and elbow   Looks like she used prednisone 20mg daily for 5 days weeks ago from PCP for interim back  pain noted; She should also contact us if symptoms of inflammatory arthritis in future   Looks like cymbalta increased 30mg interim noted     She tells me she is not sure about her meds noted; she could bring a list but I think we have updated things?     She can also call us moving forward; Jill has a direct number and can also contact me through the portal;      No overt synovitis but some interim prednisone noted; she tells me she had a lot of joint swelling prior to the prednisone noted   Verbal education  Went over lab   xrays to further evaluate at her convenience   She should call is recurs/persists  As per above   Will also keep close eye on her given interim reported history   Cont/refill meds:  Plaquenil 200mg daily  Diclofenac 50mg bid prn  Lyrica 150mg bid   Cymbalta 30mg noted)     She put herself on the schedule yesterday without getting lab or xrays or contacting us? We are here for her.     She had been working with PCP interim left ear pain was to revisit ENT; abdominal pain to have CT abdomen; some interim back pain mobic added to diclofenac (careful should not combine) and flexeril added; also had chest congestion cough and URI and was to have CXR per interim notes in Epic     She had missed after putting herself on schedule last month noted     She has some interim symptoms of inflammatory arthritis; she should call us we are here for her     She tells me she has the number but was not sure and then had trouble messaging me and Jill can help her moving forward     No longer using cymbalta it does not agree with her maybe caused her to 'hallucinate' that is now better noted     Her left foot and toes are most symptomatic      She does not feel the diclofenac helps at all noted     No overt synovitis but possibly some interim treatment? Tells me no recent interim steroids     Verbal education     Went over lab and xrays        Consider MRI left foot toes to further evaluate for inflammatory  arthritis and the chronic pain     She should also update  about the spine pain and other     She will consider doing more PT; and her insurance may require her to     Change diclofenac to nabumetone 750mg bid prn     Lyrica 150mg bid      Plaquenil 200mg daily     Call us if not improving        Revisit lab 2 weeks prior     Contact us prn     Addendum 1/26/23: She called Jill letting her know the nabumetone does not agree with her causes her throat to close and difficult swallowing so she is avoiding stopping forward     This visit:    Interim lab 2/6/23:    CPK 38  Creat 0.76; alb 4.3    WBC 11.29; Hgb 13.2; plt 292    ALC 4.81 high    UA SG 1.025 moderate epi and calcium oxalate    RF 17+        ESR 17; CRP<3    Interim 01/31/23 MRI toes:       No abnormal bone marrow signal intensity within the foot. Specifically, no   evidence of acute fracture, osteonecrosis, or osteomyelitis.      The Lisfranc ligament is intact.     Please note that this examination was not tailored to assess for internal   arrangement at the level of the ankle though the visualized tendinous and   ligamentous structures are grossly intact.      IMPRESSION:      No MR abnormality of the left foot.            Prev noted/prior plan:  (She has interim pain in hands cervical lumbar and elbow   Looks like she used prednisone 20mg daily for 5 days weeks ago from PCP for interim back pain noted; She should also contact us if symptoms of inflammatory arthritis in future   Looks like cymbalta increased 30mg interim noted     She tells me she is not sure about her meds noted; she could bring a list but I think we have updated things?     She can also call us moving forward; Jill has a direct number and can also contact me through the portal;      No overt synovitis but some interim prednisone noted; she tells me she had a lot of joint swelling prior to the prednisone noted   Verbal education  Went over lab   xrays to further evaluate at her  convenience   She should call is recurs/persists  As per above   Will also keep close eye on her given interim reported history   Cont/refill meds:  Plaquenil 200mg daily  Diclofenac 50mg bid prn  Lyrica 150mg bid   Cymbalta 30mg noted)    Prev noted/prior plan:   (She put herself on the schedule yesterday without getting lab or xrays or contacting us? We are here for her.     She had been working with PCP interim left ear pain was to revisit ENT; abdominal pain to have CT abdomen; some interim back pain mobic added to diclofenac (careful should not combine) and flexeril added; also had chest congestion cough and URI and was to have CXR per interim notes in Epic     She had missed after putting herself on schedule last month noted     She has some interim symptoms of inflammatory arthritis; she should call us we are here for her     She tells me she has the number but was not sure and then had trouble messaging me and Jill can help her moving forward     No longer using cymbalta it does not agree with her maybe caused her to 'hallucinate' that is now better noted     Her left foot and toes are most symptomatic      She does not feel the diclofenac helps at all noted     No overt synovitis but possibly some interim treatment? Tells me no recent interim steroids     Verbal education     Went over lab and xrays      Consider MRI left foot toes to further evaluate for inflammatory arthritis and the chronic pain     She should also update  about the spine pain and other     She will consider doing more PT; and her insurance may require her to     Change diclofenac to nabumetone 750mg bid prn     Lyrica 150mg bid    Plaquenil 200mg daily   Call us if not improving  Addendum 1/26/23: She called Jill letting her know the nabumetone does not agree with her causes her throat to close and difficult swallowing so she is avoiding stopping forward)      She has some interim pain in legs feet and elbows    I understood she was  no longer using the nabumetone? She is rather not    She has rather clarifies intermittent pain possible flares she can call us    Recently mostly pain free; will notice her legs will move on their own and feel 'restless' noted    No overt synovitis      Went over lab and MRI    I am not seeing active inflammatory arthritis on exam or lab noted.    Change nabumetone to naprosyn 500mg bid prn    She should revisit [facility] if needed    Lyrica 150mg bid      Plaquenil 200mg daily     Call if question or flare we can see her sooner    Revisit lab 2 weeks prior    Contact us prn    JACOB neg RF 17.9+ repeat RF 17+ CCP neg HepBsAb+sAgneg coreAb neg HCV neg uric acid 5.4  There is some history to suggest inflammatory arthritis.     Rheumatoid arthritis and fibromyalgia per records    12/21/22 xray reports:   Cervical normal  Lumbar: slight dextroscoliosis and mild DJD  CXR normal  Knees normal  Pelvis/hips: probable small bone island subtrochanteric region left femur  Hands: normal  Feet: small plantar and posterior calcaneal spurs      2022 TB negative     previously followed by Dr. Wright      She tells me Dr. Wright would not refill her diclofenac for unknown reason; no known kidney problem prev noted     She sounds like she was also started on MTX 4/week with folic acid that did not help so she stopped it but then she was back and forth about this previously noted     records from Dr. Wright some of note 5/26/22: history fibromyalgia and DJD; Opiates help with qol; no active synovitis; 14/18 trigger points; A/P: fibromyalgia and DJD; continue lyrica 150mg; RF+ ACR90 with Methotrexate; Gout is a form of arthritis; Lab; revisit 3 months   (I do see Methotrexate 2.5mg on a med list but nothing mentioned in the note other than what I wrote and gout also mentioned with patient education? Also was given depomedrol at some point? Cymbalta and lyrica noted; I don't see what opioid she was  prescribed?)     records from [facility] at [facility] [place]: some of note 7/19/22  left elbow pain f/u; prior injection helped 2 months; will repeat left lateral epicondyle injection; revisit 6 months     8/3/22 MRI cervical: multilevel DJD DDD worse C5-6 C6-7      There is also some mechanical and neurogenic pain.     She has chronic numbness in head that she was told was from shingles on 4 different occasions and had treatment but never had a rash or skin lesion noted     Chronic neck and back pain     She tells me she is also working with [facility] if not already     Prior CHERI that did not help     Also had seen Orthopedics at [facility] [place] s/p elbow injection per patient     Prev noted/prior plan:  (No overt synovitis   Verbal education   Likely mechanical and myofascial + neurogenic pain;  No overt active inflammatory arthritis but she had been on treatment MTX per patient   Blood work to further evaluate   We will seek records from Dr. Wright and records from [facility] at [facility] [place]   Resume voltaren))      Has been managing with:     Lyrica 150mg bid     Prior voltaren 50mg we will resume     Prior skelaxin     Prior Methotrexate     Prior steroids oral and injections     Has been working with:     Records Tameka PHILIP some of note 7/27/22:  ((Follow-up (Patient here with c/o back pain, generalized body aches, and skin pain (burning sensation.//1. Back pain-awaiting authorization for MRI's/2. Generalized body aches-patient currently seeing Dr. Wright, but is just being given pain medications instead of a treatment plan per patient. She is seeing Cristobal in August to establish care./3. Skin pain-has been dx with shingles in the past with no lesions/rash. Verbalizes the pain is the same.), Back Pain, Generalized Body Aches, and burning sensation to skin     HPI   Patient is here today for follow-up and new complaints.     Patient  reports burning sensation and stinging sensation over her skin.  Reports she has a history of fibromyalgia and RA.  She was seen by Dr. Wright in the past but has an upcoming appointment with Dr. Jain in August.  Patient currently takes Lyrica.     Neck and back pain-patient reports chronic pain and muscle tension in her neck and back.  She is awaiting approval for 2 are eyes.  She currently takes Mobic daily without relief.     Pt has completed 8 weeks of physical therapy for her neck and back without relief.       Assessment & Plan:      Neuropathic pain  -     Vitamin B12; Future; Expected date: 07/26/2022  -     Methylmalonic Acid, Serum; Future; Expected date: 07/26/2022  -     CBC Auto Differential; Future; Expected date: 07/26/2022     Rheumatoid arthritis involving multiple sites with positive rheumatoid factor  Comments:  Keep follow-up with Rheumatology     Neck pain  -     metaxalone (SKELAXIN) 800 MG tablet; Take 1 tablet (800 mg total) by mouth 3 (three) times daily. for 10 days  Dispense: 30 tablet; Refill: 0  -     diclofenac (VOLTAREN) 50 MG EC tablet; 1 tab 2-3 times a day with a meal  Dispense: 90 tablet; Refill: 1     Back pain, unspecified back location, unspecified back pain laterality, unspecified chronicity  -     metaxalone (SKELAXIN) 800 MG tablet; Take 1 tablet (800 mg total) by mouth 3 (three) times daily. for 10 days  Dispense: 30 tablet; Refill: 0  -     diclofenac (VOLTAREN) 50 MG EC tablet; 1 tab 2-3 times a day with a meal  Dispense: 90 tablet; Refill: 1))     Review of medical records as stated above.  Lab +/- imaging and other ordered diagnostic studies to further evaluate.  See the orders associated with this note visit.  Medications as prescribed as tolerated.    Education including verbal and those noted in the patient instructions.  Revisit as scheduled and call prn.    The following diagnoses influence medical decision making and/or need further workup including the orders  listed below:    Rheumatoid arthritis involving multiple sites with positive rheumatoid factor  -     CBC Auto Differential; Future; Expected date: 05/01/2023  -     CK; Future; Expected date: 05/01/2023  -     Comprehensive Metabolic Panel; Future; Expected date: 05/01/2023  -     C-Reactive Protein; Future; Expected date: 05/01/2023  -     Sedimentation rate; Future; Expected date: 05/01/2023  -     Urinalysis; Future; Expected date: 05/01/2023  -     hydrOXYchloroQUINE (PLAQUENIL) 200 mg tablet; Take 1 tablet (200 mg total) by mouth once daily.  Dispense: 30 tablet; Refill: 3  -     naproxen (NAPROSYN) 500 MG tablet; Take 1 tablet (500 mg total) by mouth 2 (two) times daily as needed (pain instead of nabumetone).  Dispense: 60 tablet; Refill: 3    Cervical spondylosis  -     CBC Auto Differential; Future; Expected date: 05/01/2023  -     CK; Future; Expected date: 05/01/2023  -     Comprehensive Metabolic Panel; Future; Expected date: 05/01/2023  -     C-Reactive Protein; Future; Expected date: 05/01/2023  -     Sedimentation rate; Future; Expected date: 05/01/2023  -     Urinalysis; Future; Expected date: 05/01/2023  -     naproxen (NAPROSYN) 500 MG tablet; Take 1 tablet (500 mg total) by mouth 2 (two) times daily as needed (pain instead of nabumetone).  Dispense: 60 tablet; Refill: 3    Lumbar degenerative disc disease  -     CBC Auto Differential; Future; Expected date: 05/01/2023  -     CK; Future; Expected date: 05/01/2023  -     Comprehensive Metabolic Panel; Future; Expected date: 05/01/2023  -     C-Reactive Protein; Future; Expected date: 05/01/2023  -     Sedimentation rate; Future; Expected date: 05/01/2023  -     Urinalysis; Future; Expected date: 05/01/2023  -     naproxen (NAPROSYN) 500 MG tablet; Take 1 tablet (500 mg total) by mouth 2 (two) times daily as needed (pain instead of nabumetone).  Dispense: 60 tablet; Refill: 3    Myalgia  -     CBC Auto Differential; Future; Expected date:  05/01/2023  -     CK; Future; Expected date: 05/01/2023  -     Comprehensive Metabolic Panel; Future; Expected date: 05/01/2023  -     C-Reactive Protein; Future; Expected date: 05/01/2023  -     Sedimentation rate; Future; Expected date: 05/01/2023  -     Urinalysis; Future; Expected date: 05/01/2023  -     pregabalin (LYRICA) 150 MG capsule; Take 1 capsule (150 mg total) by mouth 2 (two) times daily.  Dispense: 60 capsule; Refill: 3    Calcaneal spur of both feet  -     CBC Auto Differential; Future; Expected date: 05/01/2023  -     CK; Future; Expected date: 05/01/2023  -     Comprehensive Metabolic Panel; Future; Expected date: 05/01/2023  -     C-Reactive Protein; Future; Expected date: 05/01/2023  -     Sedimentation rate; Future; Expected date: 05/01/2023  -     Urinalysis; Future; Expected date: 05/01/2023  -     naproxen (NAPROSYN) 500 MG tablet; Take 1 tablet (500 mg total) by mouth 2 (two) times daily as needed (pain instead of nabumetone).  Dispense: 60 tablet; Refill: 3    Medication monitoring encounter  -     CBC Auto Differential; Future; Expected date: 05/01/2023  -     CK; Future; Expected date: 05/01/2023  -     Comprehensive Metabolic Panel; Future; Expected date: 05/01/2023  -     C-Reactive Protein; Future; Expected date: 05/01/2023  -     Sedimentation rate; Future; Expected date: 05/01/2023  -     Urinalysis; Future; Expected date: 05/01/2023    Screening for rheumatic disorder  -     CBC Auto Differential; Future; Expected date: 05/01/2023  -     CK; Future; Expected date: 05/01/2023  -     Comprehensive Metabolic Panel; Future; Expected date: 05/01/2023  -     C-Reactive Protein; Future; Expected date: 05/01/2023  -     Sedimentation rate; Future; Expected date: 05/01/2023  -     Urinalysis; Future; Expected date: 05/01/2023    Neuralgia  -     pregabalin (LYRICA) 150 MG capsule; Take 1 capsule (150 mg total) by mouth 2 (two) times daily.  Dispense: 60 capsule; Refill: 3      Follow up in  about 3 months (around 5/14/2023).     David Jain MD

## 2023-02-13 ENCOUNTER — TELEPHONE (OUTPATIENT)
Dept: FAMILY MEDICINE | Facility: CLINIC | Age: 43
End: 2023-02-13
Payer: COMMERCIAL

## 2023-02-13 RX ORDER — FLUCONAZOLE 150 MG/1
150 TABLET ORAL DAILY
Qty: 1 TABLET | Refills: 0 | Status: SHIPPED | OUTPATIENT
Start: 2023-02-13 | End: 2023-02-14

## 2023-02-13 RX ORDER — NITROFURANTOIN 25; 75 MG/1; MG/1
100 CAPSULE ORAL DAILY PRN
Qty: 30 CAPSULE | Refills: 2 | Status: SHIPPED | OUTPATIENT
Start: 2023-02-13 | End: 2023-05-01

## 2023-02-13 NOTE — TELEPHONE ENCOUNTER
----- Message from Elvi Nieto sent at 2/13/2023  9:39 AM CST -----  Contact: self  Requesting a call back regarding patient advice for UTI and yeast infection. Please call back at [phone].

## 2023-02-14 ENCOUNTER — OFFICE VISIT (OUTPATIENT)
Dept: RHEUMATOLOGY | Facility: CLINIC | Age: 43
End: 2023-02-14
Payer: COMMERCIAL

## 2023-02-14 VITALS
DIASTOLIC BLOOD PRESSURE: 80 MMHG | HEIGHT: 59 IN | BODY MASS INDEX: 33.1 KG/M2 | SYSTOLIC BLOOD PRESSURE: 114 MMHG | RESPIRATION RATE: 16 BRPM | WEIGHT: 164.19 LBS | HEART RATE: 90 BPM | OXYGEN SATURATION: 99 %

## 2023-02-14 DIAGNOSIS — Z13.89 SCREENING FOR RHEUMATIC DISORDER: ICD-10-CM

## 2023-02-14 DIAGNOSIS — M79.2 NEURALGIA: ICD-10-CM

## 2023-02-14 DIAGNOSIS — M79.10 MYALGIA: ICD-10-CM

## 2023-02-14 DIAGNOSIS — Z51.81 MEDICATION MONITORING ENCOUNTER: ICD-10-CM

## 2023-02-14 DIAGNOSIS — M77.31 CALCANEAL SPUR OF BOTH FEET: ICD-10-CM

## 2023-02-14 DIAGNOSIS — M51.36 LUMBAR DEGENERATIVE DISC DISEASE: ICD-10-CM

## 2023-02-14 DIAGNOSIS — M47.812 CERVICAL SPONDYLOSIS: ICD-10-CM

## 2023-02-14 DIAGNOSIS — M77.32 CALCANEAL SPUR OF BOTH FEET: ICD-10-CM

## 2023-02-14 DIAGNOSIS — M05.79 RHEUMATOID ARTHRITIS INVOLVING MULTIPLE SITES WITH POSITIVE RHEUMATOID FACTOR: Primary | ICD-10-CM

## 2023-02-14 PROCEDURE — 99214 OFFICE O/P EST MOD 30 MIN: CPT | Mod: S$GLB,,, | Performed by: INTERNAL MEDICINE

## 2023-02-14 PROCEDURE — 3074F PR MOST RECENT SYSTOLIC BLOOD PRESSURE < 130 MM HG: ICD-10-PCS | Mod: CPTII,S$GLB,, | Performed by: INTERNAL MEDICINE

## 2023-02-14 PROCEDURE — 1159F MED LIST DOCD IN RCRD: CPT | Mod: CPTII,S$GLB,, | Performed by: INTERNAL MEDICINE

## 2023-02-14 PROCEDURE — 3008F BODY MASS INDEX DOCD: CPT | Mod: CPTII,S$GLB,, | Performed by: INTERNAL MEDICINE

## 2023-02-14 PROCEDURE — 3079F DIAST BP 80-89 MM HG: CPT | Mod: CPTII,S$GLB,, | Performed by: INTERNAL MEDICINE

## 2023-02-14 PROCEDURE — 1159F PR MEDICATION LIST DOCUMENTED IN MEDICAL RECORD: ICD-10-PCS | Mod: CPTII,S$GLB,, | Performed by: INTERNAL MEDICINE

## 2023-02-14 PROCEDURE — 99214 PR OFFICE/OUTPT VISIT, EST, LEVL IV, 30-39 MIN: ICD-10-PCS | Mod: S$GLB,,, | Performed by: INTERNAL MEDICINE

## 2023-02-14 PROCEDURE — 3079F PR MOST RECENT DIASTOLIC BLOOD PRESSURE 80-89 MM HG: ICD-10-PCS | Mod: CPTII,S$GLB,, | Performed by: INTERNAL MEDICINE

## 2023-02-14 PROCEDURE — 3008F PR BODY MASS INDEX (BMI) DOCUMENTED: ICD-10-PCS | Mod: CPTII,S$GLB,, | Performed by: INTERNAL MEDICINE

## 2023-02-14 PROCEDURE — 1160F PR REVIEW ALL MEDS BY PRESCRIBER/CLIN PHARMACIST DOCUMENTED: ICD-10-PCS | Mod: CPTII,S$GLB,, | Performed by: INTERNAL MEDICINE

## 2023-02-14 PROCEDURE — 1160F RVW MEDS BY RX/DR IN RCRD: CPT | Mod: CPTII,S$GLB,, | Performed by: INTERNAL MEDICINE

## 2023-02-14 PROCEDURE — 3074F SYST BP LT 130 MM HG: CPT | Mod: CPTII,S$GLB,, | Performed by: INTERNAL MEDICINE

## 2023-02-14 RX ORDER — NAPROXEN 500 MG/1
500 TABLET ORAL 2 TIMES DAILY PRN
Qty: 60 TABLET | Refills: 3 | Status: SHIPPED | OUTPATIENT
Start: 2023-02-14 | End: 2023-05-01

## 2023-02-14 RX ORDER — HYDROXYCHLOROQUINE SULFATE 200 MG/1
200 TABLET, FILM COATED ORAL DAILY
Qty: 30 TABLET | Refills: 3 | Status: SHIPPED | OUTPATIENT
Start: 2023-02-14 | End: 2023-05-16 | Stop reason: SDUPTHER

## 2023-02-14 RX ORDER — PREGABALIN 150 MG/1
150 CAPSULE ORAL 2 TIMES DAILY
Qty: 60 CAPSULE | Refills: 3 | Status: SHIPPED | OUTPATIENT
Start: 2023-02-14 | End: 2023-05-16 | Stop reason: SDUPTHER

## 2023-02-20 DIAGNOSIS — B37.31 VAGINAL YEAST INFECTION: Primary | ICD-10-CM

## 2023-02-20 RX ORDER — FLUCONAZOLE 150 MG/1
150 TABLET ORAL EVERY OTHER DAY
Qty: 2 TABLET | Refills: 2 | Status: SHIPPED | OUTPATIENT
Start: 2023-02-20 | End: 2023-02-23

## 2023-02-20 NOTE — TELEPHONE ENCOUNTER
Spoke with pt. Advised her to keep appointment with Chana Clark for this Thursday, but that we will call out Diflucan to her pharmacy. She understood.

## 2023-02-20 NOTE — TELEPHONE ENCOUNTER
----- Message from Pilar Regalado sent at 2/20/2023 12:55 PM CST -----  Contact: self  Type:  Sooner Appointment Request    Caller is requesting a sooner appointment.  Caller declined first available appointment listed below.  Caller will not accept being placed on the waitlist and is requesting a message be sent to doctor.  Name of Caller:Rosa Silvio   When is the first available appointment? 04/18/23  Symptoms: concerns w/severe yeast infection   Would the patient rather a call back or a response via [facility]? Call back   Best Call Back Number: [phone]   Additional Information:

## 2023-02-23 ENCOUNTER — OFFICE VISIT (OUTPATIENT)
Dept: FAMILY MEDICINE | Facility: CLINIC | Age: 43
End: 2023-02-23
Payer: COMMERCIAL

## 2023-02-23 VITALS
HEART RATE: 80 BPM | HEIGHT: 59 IN | SYSTOLIC BLOOD PRESSURE: 132 MMHG | BODY MASS INDEX: 29.53 KG/M2 | OXYGEN SATURATION: 97 % | WEIGHT: 146.5 LBS | DIASTOLIC BLOOD PRESSURE: 80 MMHG

## 2023-02-23 DIAGNOSIS — B37.31 VAGINAL CANDIDA: Primary | ICD-10-CM

## 2023-02-23 DIAGNOSIS — N30.10 INTERSTITIAL CYSTITIS: ICD-10-CM

## 2023-02-23 DIAGNOSIS — E11.9 TYPE 2 DIABETES MELLITUS WITHOUT COMPLICATION, WITHOUT LONG-TERM CURRENT USE OF INSULIN: ICD-10-CM

## 2023-02-23 LAB — GLUCOSE SERPL-MCNC: 91 MG/DL (ref 70–110)

## 2023-02-23 PROCEDURE — 3079F PR MOST RECENT DIASTOLIC BLOOD PRESSURE 80-89 MM HG: ICD-10-PCS | Mod: CPTII,S$GLB,, | Performed by: NURSE PRACTITIONER

## 2023-02-23 PROCEDURE — 3075F PR MOST RECENT SYSTOLIC BLOOD PRESS GE 130-139MM HG: ICD-10-PCS | Mod: CPTII,S$GLB,, | Performed by: NURSE PRACTITIONER

## 2023-02-23 PROCEDURE — 99214 OFFICE O/P EST MOD 30 MIN: CPT | Mod: S$GLB,,, | Performed by: NURSE PRACTITIONER

## 2023-02-23 PROCEDURE — 1160F RVW MEDS BY RX/DR IN RCRD: CPT | Mod: CPTII,S$GLB,, | Performed by: NURSE PRACTITIONER

## 2023-02-23 PROCEDURE — 1159F MED LIST DOCD IN RCRD: CPT | Mod: CPTII,S$GLB,, | Performed by: NURSE PRACTITIONER

## 2023-02-23 PROCEDURE — 3079F DIAST BP 80-89 MM HG: CPT | Mod: CPTII,S$GLB,, | Performed by: NURSE PRACTITIONER

## 2023-02-23 PROCEDURE — 1159F PR MEDICATION LIST DOCUMENTED IN MEDICAL RECORD: ICD-10-PCS | Mod: CPTII,S$GLB,, | Performed by: NURSE PRACTITIONER

## 2023-02-23 PROCEDURE — 99214 PR OFFICE/OUTPT VISIT, EST, LEVL IV, 30-39 MIN: ICD-10-PCS | Mod: S$GLB,,, | Performed by: NURSE PRACTITIONER

## 2023-02-23 PROCEDURE — 3008F PR BODY MASS INDEX (BMI) DOCUMENTED: ICD-10-PCS | Mod: CPTII,S$GLB,, | Performed by: NURSE PRACTITIONER

## 2023-02-23 PROCEDURE — 3008F BODY MASS INDEX DOCD: CPT | Mod: CPTII,S$GLB,, | Performed by: NURSE PRACTITIONER

## 2023-02-23 PROCEDURE — 1160F PR REVIEW ALL MEDS BY PRESCRIBER/CLIN PHARMACIST DOCUMENTED: ICD-10-PCS | Mod: CPTII,S$GLB,, | Performed by: NURSE PRACTITIONER

## 2023-02-23 PROCEDURE — 82962 GLUCOSE BLOOD TEST: CPT | Mod: ,,, | Performed by: NURSE PRACTITIONER

## 2023-02-23 PROCEDURE — 3075F SYST BP GE 130 - 139MM HG: CPT | Mod: CPTII,S$GLB,, | Performed by: NURSE PRACTITIONER

## 2023-02-23 PROCEDURE — 82962 POCT GLUCOSE, HAND-HELD DEVICE: ICD-10-PCS | Mod: ,,, | Performed by: NURSE PRACTITIONER

## 2023-02-23 RX ORDER — FLUCONAZOLE 100 MG/1
TABLET ORAL
Qty: 5 TABLET | Refills: 0 | Status: SHIPPED | OUTPATIENT
Start: 2023-02-23 | End: 2023-03-09

## 2023-02-23 RX ORDER — HYDROCORTISONE ACETATE 25 MG/1
25 SUPPOSITORY RECTAL
COMMUNITY
Start: 2023-02-13

## 2023-02-23 RX ORDER — NYSTATIN 100000 U/G
CREAM TOPICAL 2 TIMES DAILY
Qty: 30 G | Refills: 1 | Status: SHIPPED | OUTPATIENT
Start: 2023-02-23 | End: 2023-09-05 | Stop reason: SDUPTHER

## 2023-02-23 RX ORDER — HYDROCORTISONE 2.5% 25 MG/G
30 CREAM TOPICAL
COMMUNITY
Start: 2023-02-13

## 2023-02-23 NOTE — PROGRESS NOTES
Patient ID: Rosa Anguiano  MRN: [id number]    Chief Complaint: Urinary Tract Infection (And yeast infection)      Allergies: Patient has No Known Allergies.     Smoking status:  Social History     Tobacco Use   Smoking Status Never   Smokeless Tobacco Never       Problem List:  Patient Active Problem List   Diagnosis    Rheumatoid arthritis involving multiple sites with positive rheumatoid factor    Fibromyalgia    Class 1 obesity due to excess calories with serious comorbidity and body mass index (BMI) of 34.0 to 34.9 in adult    Type 2 diabetes mellitus, without long-term current use of insulin    GERD (gastroesophageal reflux disease)        Current Medications:  Current Outpatient Medications   Medication Instructions    amitriptyline (ELAVIL) 10 mg, Oral, Nightly PRN    blood-glucose meter,continuous (DEXCOM G6 ) Misc Use as instructed    blood-glucose sensor (DEXCOM G6 SENSOR) Kath Use as instructed    blood-glucose transmitter (DEXCOM G6 TRANSMITTER) Kath Use as instructed    boric acid (PH-D) 600 mg, Vaginal, Nightly, As directed    famotidine (PEPCID) 20 MG tablet TAKE 1 TABLET BY MOUTH TWICE DAILY AS NEEDED FOR STOMACH ACID OR REFLUX OR HEARTBURN    fluconazole (DIFLUCAN) 100 MG tablet Take one tablet daily x 5 days    fluticasone propionate (FLONASE) 50 mcg/actuation nasal spray 2 sprays, Each Nostril, 2 times daily    hydrocortisone (ANUSOL-HC) 25 mg, Rectal, As needed (PRN)    hydrOXYchloroQUINE (PLAQUENIL) 200 mg, Oral, Daily    hydrOXYzine pamoate (VISTARIL) 25 MG Cap No dose, route, or frequency recorded.    hyoscyamine (ANASPAZ,LEVSIN) 125 mcg, Oral, Every 4 hours PRN    basilio-m.blue-s.phos-phsal-hyo (URIBEL) 118-10-40.8-36 mg Cap 1 capsule, Oral, 3 times daily PRN    naproxen (NAPROSYN) 500 mg, Oral, 2 times daily PRN    nitrofurantoin, macrocrystal-monohydrate, (MACROBID) 100 MG capsule 100 mg, Oral, Daily PRN    nystatin (MYCOSTATIN) cream Topical (Top), 2 times daily    oxybutynin  "(DITROPAN XL) 15 mg, Oral, Daily    OZEMPIC 2 mg/dose (8 mg/3 mL) PnIj No dose, route, or frequency recorded.    pantoprazole (PROTONIX) 40 MG tablet No dose, route, or frequency recorded.    pregabalin (LYRICA) 150 mg, Oral, 2 times daily    PROAIR HFA 90 mcg/actuation inhaler INL 2 PFS PO Q 4 H    PROCTOZONE-HC 30 g, Rectal, As needed (PRN)       History of Present Illness:  The patient is a 42 y.o. Other female who presents to clinic for evaluation and management with a chief complaint of Urinary Tract Infection (And yeast infection)     HPI Pt presents with c/o continued  yeast infection and burning with urination. She has interstitial cystitis and is taking uribel that turns her urine green. She does see a urologist. She recently completed a course of antibiotics, macrobid and said she was given diflucan but is still having yeast infection. C/o burning and itching to vaginal area.     Review of Systems   Constitutional: Negative.    HENT: Negative.     Eyes: Negative.    Respiratory: Negative.     Cardiovascular: Negative.    Gastrointestinal: Negative.    Endocrine: Negative.    Genitourinary:  Positive for dysuria and vaginal pain.   Integumentary:  Negative.   Allergic/Immunologic: Negative.    Neurological: Negative.    Hematological: Negative.    Psychiatric/Behavioral: Negative.        Visit Vitals  /80 (BP Location: Left arm, Patient Position: Sitting, BP Method: Medium (Manual))   Pulse 80   Ht 4' 11" (1.499 m)   Wt 66.5 kg (146 lb 8 oz)   SpO2 97%   BMI 29.59 kg/m²       Physical Exam  Vitals and nursing note reviewed.   Constitutional:       Appearance: Normal appearance.   HENT:      Head: Normocephalic.      Nose: Nose normal.      Mouth/Throat:      Mouth: Mucous membranes are moist.      Pharynx: Oropharynx is clear.   Eyes:      Conjunctiva/sclera: Conjunctivae normal.   Cardiovascular:      Rate and Rhythm: Normal rate and regular rhythm.   Pulmonary:      Effort: Pulmonary effort is " normal.      Breath sounds: Normal breath sounds.   Musculoskeletal:         General: Normal range of motion.      Cervical back: Normal range of motion.   Skin:     General: Skin is warm and dry.   Neurological:      General: No focal deficit present.      Mental Status: She is alert.   Psychiatric:         Mood and Affect: Mood normal.         Behavior: Behavior normal.        Assessment & Plan:  1. Vaginal candida  -     fluconazole (DIFLUCAN) 100 MG tablet; Take one tablet daily x 5 days  Dispense: 5 tablet; Refill: 0  Pt notes she has been on several antibiotics due to having interstitial cystitis , she does see a urologist.   Sent in oral meds as well topical cream.     2. Interstitial cystitis  -     Urine culture  Will wait for culture prior to sending out any antibiotic.     3. Type 2 diabetes mellitus without complication, without long-term current use of insulin  -     POCT Glucose, Hand-Held Device; Future; Expected date: 02/23/2023  Reviewed glucose reading. Instructed importance of following low carb consistent meals and monitoring glucose levels at home. Reviewed recommended goals for blood sugar levels per ADA : am fasting  and 2 hour post prandial 140-160. Discussed importance of daily exercise and recommended 30 min of walking at least 5 times a week. Reviewed possible complications of uncontrolled blood glucose levels including micro and macrovascular changes. Instructed need for yearly eye exam and daily inspection of feet.      Other orders  -     nystatin (MYCOSTATIN) cream; Apply topically 2 (two) times daily.  Dispense: 30 g; Refill: 1         Future Appointments   Date Time Provider Department Center   5/16/2023  8:45 AM David aJin MD [facility] RHEUM [facility]   6/26/2023  9:20 AM Chucky Huerta NP [facility] UROLOGY  [address]       No follow-ups on file. . Call sooner if needed.    Peyton Hernández NP    Lab Frequency Next Occurrence   Ambulatory referral/consult to Rheumatology Once  07/06/2022   Ambulatory referral/consult to Ophthalmology Once 10/26/2022   US Abdomen Limited Once 10/26/2022   Ambulatory referral/consult to Rheumatology Once 11/16/2022

## 2023-02-27 LAB — URINE CULTURE, ROUTINE: NORMAL

## 2023-03-09 ENCOUNTER — CLINICAL SUPPORT (OUTPATIENT)
Dept: UROLOGY | Facility: CLINIC | Age: 43
End: 2023-03-09
Payer: COMMERCIAL

## 2023-03-09 RX ORDER — FLUCONAZOLE 150 MG/1
150 TABLET ORAL DAILY
Qty: 2 TABLET | Refills: 0 | Status: SHIPPED | OUTPATIENT
Start: 2023-03-09 | End: 2023-03-11

## 2023-03-09 NOTE — PROGRESS NOTES
Drop off urine for burning.  States could be infection or yeast.  UA is normal.  Will treat with Diflucan.

## 2023-03-30 ENCOUNTER — OFFICE VISIT (OUTPATIENT)
Dept: UROLOGY | Facility: CLINIC | Age: 43
End: 2023-03-30
Payer: COMMERCIAL

## 2023-03-30 VITALS — HEART RATE: 84 BPM | DIASTOLIC BLOOD PRESSURE: 76 MMHG | SYSTOLIC BLOOD PRESSURE: 130 MMHG

## 2023-03-30 DIAGNOSIS — N30.10 INTERSTITIAL CYSTITIS: ICD-10-CM

## 2023-03-30 PROCEDURE — 3075F PR MOST RECENT SYSTOLIC BLOOD PRESS GE 130-139MM HG: ICD-10-PCS | Mod: CPTII,S$GLB,, | Performed by: NURSE PRACTITIONER

## 2023-03-30 PROCEDURE — 99214 PR OFFICE/OUTPT VISIT, EST, LEVL IV, 30-39 MIN: ICD-10-PCS | Mod: S$GLB,,, | Performed by: NURSE PRACTITIONER

## 2023-03-30 PROCEDURE — 3078F PR MOST RECENT DIASTOLIC BLOOD PRESSURE < 80 MM HG: ICD-10-PCS | Mod: CPTII,S$GLB,, | Performed by: NURSE PRACTITIONER

## 2023-03-30 PROCEDURE — 1160F RVW MEDS BY RX/DR IN RCRD: CPT | Mod: CPTII,S$GLB,, | Performed by: NURSE PRACTITIONER

## 2023-03-30 PROCEDURE — 1159F MED LIST DOCD IN RCRD: CPT | Mod: CPTII,S$GLB,, | Performed by: NURSE PRACTITIONER

## 2023-03-30 PROCEDURE — 1160F PR REVIEW ALL MEDS BY PRESCRIBER/CLIN PHARMACIST DOCUMENTED: ICD-10-PCS | Mod: CPTII,S$GLB,, | Performed by: NURSE PRACTITIONER

## 2023-03-30 PROCEDURE — 1159F PR MEDICATION LIST DOCUMENTED IN MEDICAL RECORD: ICD-10-PCS | Mod: CPTII,S$GLB,, | Performed by: NURSE PRACTITIONER

## 2023-03-30 PROCEDURE — 3078F DIAST BP <80 MM HG: CPT | Mod: CPTII,S$GLB,, | Performed by: NURSE PRACTITIONER

## 2023-03-30 PROCEDURE — 99214 OFFICE O/P EST MOD 30 MIN: CPT | Mod: S$GLB,,, | Performed by: NURSE PRACTITIONER

## 2023-03-30 PROCEDURE — 3075F SYST BP GE 130 - 139MM HG: CPT | Mod: CPTII,S$GLB,, | Performed by: NURSE PRACTITIONER

## 2023-03-30 RX ORDER — CLOTRIMAZOLE AND BETAMETHASONE DIPROPIONATE 10; .64 MG/G; MG/G
CREAM TOPICAL 2 TIMES DAILY
Qty: 15 G | Refills: 2 | Status: SHIPPED | OUTPATIENT
Start: 2023-03-30

## 2023-03-30 RX ORDER — ESTRADIOL 0.1 MG/G
1 CREAM VAGINAL
Qty: 42.5 G | Refills: 5 | Status: SHIPPED | OUTPATIENT
Start: 2023-03-31 | End: 2024-03-30

## 2023-03-30 NOTE — PROGRESS NOTES
Subjective:       Patient ID: Rosa Anguiano is a 42 y.o. female.    Chief Complaint: No chief complaint on file.      HPI: 42-year-old female history of IC her main complaint today is vaginal burning irritation with voiding.  She is on boric acid vaginal suppositories by her PCP and Diflucan once weekly with no positive outcome.  She is not complaining of any IC flares at this time but needs refill on her Uribel.  She denies any symptoms of a UTI.  Patient states she has a red dry flat rash bilateral inner thighs.  It is pruritic.  She is unsure if this is caused by urinary leakage with cough and exercise.  She wears 1 pad a day.  She denies urgency frequency or urge incontinence.   7 para 4 3 miscarriage.  Partial hysterectomy.       Past Medical History:   Past Medical History:   Diagnosis Date    Acute pelvic pain, female     Breast pain     Cystitis     Diabetes mellitus     Dyspareunia, female     Endometriosis     Fibromyalgia     GERD (gastroesophageal reflux disease)     IBS (irritable bowel syndrome)     Interstitial cystitis     Irregular menstrual cycle     Leukorrhea     Osteoarthritis     Ovarian cyst     Pneumonia     YAMILET (stress urinary incontinence, female)     Vaginal yeast infection     Vulvitis        Past Surgical Historical:   Past Surgical History:   Procedure Laterality Date     SECTION      CHOLECYSTECTOMY      LAPAROSCOPY-ASSISTED VAGINAL HYSTERECTOMY      TUBAL LIGATION          Medications:   Medication List with Changes/Refills   Current Medications    AMITRIPTYLINE (ELAVIL) 10 MG TABLET    Take 1 tablet (10 mg total) by mouth nightly as needed for Insomnia.    BLOOD-GLUCOSE METER,CONTINUOUS (DEXCOM G6 ) MISC    Use as instructed    BLOOD-GLUCOSE SENSOR (DEXCOM G6 SENSOR) MARIELENA    Use as instructed    BLOOD-GLUCOSE TRANSMITTER (DEXCOM G6 TRANSMITTER) MARIELENA    Use as instructed    BORIC ACID (PH-D) 600 MG VAGINAL SUPPOSITORY    Place 1 suppository  (600 mg total) vaginally every evening. As directed    FAMOTIDINE (PEPCID) 20 MG TABLET    TAKE 1 TABLET BY MOUTH TWICE DAILY AS NEEDED FOR STOMACH ACID OR REFLUX OR HEARTBURN    FLUTICASONE PROPIONATE (FLONASE) 50 MCG/ACTUATION NASAL SPRAY    2 sprays by Each Nostril route 2 (two) times daily.    HYDROCORTISONE (ANUSOL-HC) 25 MG SUPPOSITORY    Place 25 mg rectally as needed.    HYDROXYCHLOROQUINE (PLAQUENIL) 200 MG TABLET    Take 1 tablet (200 mg total) by mouth once daily.    HYDROXYZINE PAMOATE (VISTARIL) 25 MG CAP        HYOSCYAMINE (ANASPAZ,LEVSIN) 0.125 MG TAB    Take 1 tablet (125 mcg total) by mouth every 4 (four) hours as needed (stomach pain).    METHEN-M.BLUE-S.PHOS-PHSAL-HYO (URIBEL) 118-10-40.8-36 MG CAP    Take 1 capsule by mouth 3 (three) times daily as needed (bladder pain).    NAPROXEN (NAPROSYN) 500 MG TABLET    Take 1 tablet (500 mg total) by mouth 2 (two) times daily as needed (pain instead of nabumetone).    NITROFURANTOIN, MACROCRYSTAL-MONOHYDRATE, (MACROBID) 100 MG CAPSULE    Take 1 capsule (100 mg total) by mouth daily as needed (post coital).    NYSTATIN (MYCOSTATIN) CREAM    Apply topically 2 (two) times daily.    OXYBUTYNIN (DITROPAN XL) 15 MG TR24    TAKE 1 TABLET BY MOUTH ONCE DAILY.    OZEMPIC 2 MG/DOSE (8 MG/3 ML) PNIJ        PANTOPRAZOLE (PROTONIX) 40 MG TABLET        PREGABALIN (LYRICA) 150 MG CAPSULE    Take 1 capsule (150 mg total) by mouth 2 (two) times daily.    PROAIR HFA 90 MCG/ACTUATION INHALER    INL 2 PFS PO Q 4 H    PROCTOZONE-HC 2.5 % RECTAL CREAM    Place 30 g rectally as needed.        Past Social History:   Social History     Socioeconomic History    Marital status:    Tobacco Use    Smoking status: Never    Smokeless tobacco: Never   Substance and Sexual Activity    Alcohol use: Not Currently    Drug use: Never    Sexual activity: Yes     Partners: Male     Birth control/protection: See Surgical Hx     Comment: Hysterectomy       Allergies: Review of  patient's allergies indicates:  No Known Allergies     Family History:   Family History   Problem Relation Age of Onset    Colon cancer Father 60    Prostate cancer Father 60    Breast cancer Sister 66    Ovarian cancer Neg Hx     Uterine cancer Neg Hx     Melanoma Neg Hx     Pancreatic cancer Neg Hx         Review of Systems:  Review of Systems   Constitutional:  Negative for activity change and appetite change.   HENT:  Negative for congestion and dental problem.    Respiratory:  Negative for chest tightness and shortness of breath.    Cardiovascular:  Negative for chest pain.   Gastrointestinal:  Negative for abdominal distention and abdominal pain.   Genitourinary:  Negative for decreased urine volume, difficulty urinating, dyspareunia, dysuria, enuresis, flank pain, frequency, genital sores, hematuria, pelvic pain and urgency.   Musculoskeletal:  Negative for back pain and neck pain.   Allergic/Immunologic: Negative for immunocompromised state.   Neurological:  Negative for dizziness.   Hematological:  Negative for adenopathy.   Psychiatric/Behavioral:  Negative for agitation, behavioral problems and confusion.      Physical Exam:  Physical Exam  Constitutional:       Appearance: She is well-developed.   HENT:      Head: Normocephalic and atraumatic.   Eyes:      General: No scleral icterus.  Pulmonary:      Effort: Pulmonary effort is normal.      Breath sounds: Normal breath sounds.   Abdominal:      General: There is no distension.      Palpations: Abdomen is soft.      Tenderness: There is no abdominal tenderness.   Genitourinary:     Exam position: Supine.      Labia:         Right: No rash, tenderness or lesion.         Left: No rash, tenderness or lesion.       Vagina: Normal.      Rectum: Normal.   Musculoskeletal:      Cervical back: Normal range of motion.   Skin:     General: Skin is warm and dry.   Neurological:      Mental Status: She is alert and oriented to person, place, and time.        Assessment/Plan:   Atrophic vaginitis--discontinue boric acid vaginal suppository.  Rx Estrace cream.      Bilateral inguinal rash--Rx clotrimazole betamethasone topical.  Continue Diflucan once weekly as prescribed by gyn.  Once rash is cleared gave her instruction to start over-the-counter Calmoseptine topical ointment as directed.    Stress incontinence--re-evaluate 6 weeks next return patient would like to have rashes /atrophic vaginitis improved prior to a workup for stress incontinence.  Problem List Items Addressed This Visit    None

## 2023-05-01 ENCOUNTER — OFFICE VISIT (OUTPATIENT)
Dept: FAMILY MEDICINE | Facility: CLINIC | Age: 43
End: 2023-05-01
Payer: COMMERCIAL

## 2023-05-01 VITALS
HEIGHT: 59 IN | DIASTOLIC BLOOD PRESSURE: 84 MMHG | WEIGHT: 149 LBS | SYSTOLIC BLOOD PRESSURE: 122 MMHG | HEART RATE: 76 BPM | BODY MASS INDEX: 30.04 KG/M2 | OXYGEN SATURATION: 98 %

## 2023-05-01 DIAGNOSIS — E11.9 DIABETES MELLITUS WITHOUT COMPLICATION: ICD-10-CM

## 2023-05-01 DIAGNOSIS — M54.9 UPPER BACK PAIN: Primary | ICD-10-CM

## 2023-05-01 DIAGNOSIS — J30.1 SEASONAL ALLERGIC RHINITIS DUE TO POLLEN: ICD-10-CM

## 2023-05-01 DIAGNOSIS — E78.2 MIXED HYPERLIPIDEMIA: ICD-10-CM

## 2023-05-01 LAB — GLUCOSE SERPL-MCNC: 98 MG/DL (ref 70–110)

## 2023-05-01 PROCEDURE — 99214 PR OFFICE/OUTPT VISIT, EST, LEVL IV, 30-39 MIN: ICD-10-PCS | Mod: S$GLB,,, | Performed by: NURSE PRACTITIONER

## 2023-05-01 PROCEDURE — 82962 POCT GLUCOSE, HAND-HELD DEVICE: ICD-10-PCS | Mod: ,,, | Performed by: NURSE PRACTITIONER

## 2023-05-01 PROCEDURE — 3079F DIAST BP 80-89 MM HG: CPT | Mod: CPTII,S$GLB,, | Performed by: NURSE PRACTITIONER

## 2023-05-01 PROCEDURE — 1159F MED LIST DOCD IN RCRD: CPT | Mod: CPTII,S$GLB,, | Performed by: NURSE PRACTITIONER

## 2023-05-01 PROCEDURE — 1160F PR REVIEW ALL MEDS BY PRESCRIBER/CLIN PHARMACIST DOCUMENTED: ICD-10-PCS | Mod: CPTII,S$GLB,, | Performed by: NURSE PRACTITIONER

## 2023-05-01 PROCEDURE — 3074F PR MOST RECENT SYSTOLIC BLOOD PRESSURE < 130 MM HG: ICD-10-PCS | Mod: CPTII,S$GLB,, | Performed by: NURSE PRACTITIONER

## 2023-05-01 PROCEDURE — 1159F PR MEDICATION LIST DOCUMENTED IN MEDICAL RECORD: ICD-10-PCS | Mod: CPTII,S$GLB,, | Performed by: NURSE PRACTITIONER

## 2023-05-01 PROCEDURE — 3008F PR BODY MASS INDEX (BMI) DOCUMENTED: ICD-10-PCS | Mod: CPTII,S$GLB,, | Performed by: NURSE PRACTITIONER

## 2023-05-01 PROCEDURE — 3079F PR MOST RECENT DIASTOLIC BLOOD PRESSURE 80-89 MM HG: ICD-10-PCS | Mod: CPTII,S$GLB,, | Performed by: NURSE PRACTITIONER

## 2023-05-01 PROCEDURE — 99214 OFFICE O/P EST MOD 30 MIN: CPT | Mod: S$GLB,,, | Performed by: NURSE PRACTITIONER

## 2023-05-01 PROCEDURE — 3074F SYST BP LT 130 MM HG: CPT | Mod: CPTII,S$GLB,, | Performed by: NURSE PRACTITIONER

## 2023-05-01 PROCEDURE — 1160F RVW MEDS BY RX/DR IN RCRD: CPT | Mod: CPTII,S$GLB,, | Performed by: NURSE PRACTITIONER

## 2023-05-01 PROCEDURE — 82962 GLUCOSE BLOOD TEST: CPT | Mod: ,,, | Performed by: NURSE PRACTITIONER

## 2023-05-01 PROCEDURE — 3008F BODY MASS INDEX DOCD: CPT | Mod: CPTII,S$GLB,, | Performed by: NURSE PRACTITIONER

## 2023-05-01 RX ORDER — MELOXICAM 15 MG/1
15 TABLET ORAL DAILY
COMMUNITY
Start: 2023-04-25

## 2023-05-01 RX ORDER — TIZANIDINE 4 MG/1
4 TABLET ORAL EVERY 6 HOURS PRN
Qty: 30 TABLET | Refills: 0 | Status: SHIPPED | OUTPATIENT
Start: 2023-05-01 | End: 2023-05-11

## 2023-05-01 RX ORDER — FAMOTIDINE 40 MG/1
40 TABLET, FILM COATED ORAL DAILY
COMMUNITY
Start: 2023-02-13

## 2023-05-01 NOTE — PROGRESS NOTES
Patient ID: Rosa Anguiano  MRN: [id number]    Chief Complaint: c/o upper /mid back discomfort and swollen lymph nodes, ear pain.     Allergies: Patient is allergic to nabumetone.     Smoking status:  Social History     Tobacco Use   Smoking Status Never   Smokeless Tobacco Never       Problem List:  Patient Active Problem List   Diagnosis    Rheumatoid arthritis involving multiple sites with positive rheumatoid factor    Fibromyalgia    Class 1 obesity due to excess calories with serious comorbidity and body mass index (BMI) of 34.0 to 34.9 in adult    Type 2 diabetes mellitus, without long-term current use of insulin    GERD (gastroesophageal reflux disease)        Current Medications:  Current Outpatient Medications   Medication Instructions    blood-glucose meter,continuous (DEXCOM G6 ) Misc Use as instructed    blood-glucose sensor (DEXCOM G6 SENSOR) Kath Use as instructed    blood-glucose transmitter (DEXCOM G6 TRANSMITTER) Kath Use as instructed    boric acid (PH-D) 600 mg, Vaginal, Nightly, As directed    clotrimazole-betamethasone 1-0.05% (LOTRISONE) cream Topical (Top), 2 times daily    estradioL (ESTRACE) 1 g, Vaginal, Every Mon, Wed, Fri    famotidine (PEPCID) 40 mg, Oral, Daily    fluticasone propionate (FLONASE) 50 mcg/actuation nasal spray 2 sprays, Each Nostril, 2 times daily    hydrocortisone (ANUSOL-HC) 25 mg, Rectal, As needed (PRN)    hydrOXYchloroQUINE (PLAQUENIL) 200 mg, Oral, Daily    hydrOXYzine pamoate (VISTARIL) 25 MG Cap No dose, route, or frequency recorded.    meloxicam (MOBIC) 15 mg, Oral, Daily    nystatin (MYCOSTATIN) cream Topical (Top), 2 times daily    OZEMPIC 2 mg/dose (8 mg/3 mL) PnIj No dose, route, or frequency recorded.    pantoprazole (PROTONIX) 40 MG tablet No dose, route, or frequency recorded.    pregabalin (LYRICA) 150 mg, Oral, 2 times daily    PROAIR HFA 90 mcg/actuation inhaler INL 2 PFS PO Q 4 H    PROCTOZONE-HC 30 g, Rectal, As needed (PRN)    tiZANidine  "(ZANAFLEX) 4 mg, Oral, Every 6 hours PRN       History of Present Illness:  The patient is a 42 y.o. Other female who presents to clinic for evaluation and management with a chief complaint of HPI     Review of Systems   Constitutional: Negative.    HENT:  Positive for nasal congestion, ear pain and postnasal drip.    Eyes: Negative.    Respiratory: Negative.     Cardiovascular: Negative.    Gastrointestinal: Negative.    Endocrine: Negative.    Genitourinary: Negative.    Musculoskeletal:  Positive for back pain.        Describes area between shoulder blades and slightly higher as sore . She said back has been hurting for a few weeks. She sees a rheumatologist and was prescribed meloxicam but said she continues to have some soreness intermittently and area f eels "tight".    Integumentary:  Negative.   Allergic/Immunologic: Positive for environmental allergies.   Neurological: Negative.    Hematological:  Positive for adenopathy.        C/o swollen lymph node on left side of neck    Psychiatric/Behavioral: Negative.        Visit Vitals  /84 (BP Location: Right arm, Patient Position: Sitting, BP Method: Medium (Manual))   Pulse 76   Ht 4' 11" (1.499 m)   Wt 67.6 kg (149 lb)   SpO2 98%   BMI 30.09 kg/m²       Physical Exam  Constitutional:       Appearance: Normal appearance.   HENT:      Head: Normocephalic.      Right Ear: Tympanic membrane and ear canal normal.      Left Ear: Tympanic membrane and ear canal normal.      Nose:      Comments: Nasal passages are edematous , boggy with enlarged nasal turbinates, more pronounced on the left     Oropharynx slightly erythematous with + cobblestoning consistent with post nasal drip   Eyes:      Extraocular Movements: Extraocular movements intact.      Conjunctiva/sclera: Conjunctivae normal.   Neck:      Comments: C/o tenderness to anterior cervical area, however no adenopathy noted on the left, but slightly enlarged anterior cervical lymph node noted on the right. "   Cardiovascular:      Rate and Rhythm: Normal rate and regular rhythm.   Pulmonary:      Effort: Pulmonary effort is normal.      Breath sounds: Normal breath sounds.   Musculoskeletal:      Cervical back: Normal range of motion and neck supple. Tenderness present.      Comments: Reproducible tenderness noted to palpation of upper back /mid back , noted paraspinal muscles very tight .    Skin:     General: Skin is warm and dry.   Neurological:      General: No focal deficit present.      Mental Status: She is oriented to person, place, and time.   Psychiatric:         Mood and Affect: Mood normal.        Assessment & Plan:  1. Upper back pain  -     tiZANidine (ZANAFLEX) 4 MG tablet; Take 1 tablet (4 mg total) by mouth every 6 (six) hours as needed.  Dispense: 30 tablet; Refill: 0  Consider deep tissue massage, alternate ice and heat and alternate tylenol and ibuprofen q 4 hours, rest. Call for worsening sx.     2. Seasonal allergic rhinitis due to pollen  Advised use of normal saline irrigation, use of flonase and an oral antihistamine to reduce symptoms.     3. Diabetes mellitus without complication  -     POCT Glucose, Hand-Held Device; Future; Expected date: 05/01/2023  Pt notes she sees endocrinologist and her last most recent A1c was 5.5 % , very well controlled and chencho meds well.     4. Mixed hyperlipidemia  -     Lipid Panel; Future; Expected date: 05/01/2023  -     Comprehensive Metabolic Panel; Future; Expected date: 05/01/2023  Will have her check levels and notify of lab results and any next steps .        Future Appointments   Date Time Provider Department Center   5/1/2023  4:30 PM Peyton Hernández NP [facility] FAMMED  [id number]   5/16/2023  8:45 AM David Jain MD [facility] RHEUM [facility]   6/26/2023  9:20 AM Chucky Huerta NP [facility] UROLOGY  [address]   7/12/2023 11:20 AM Flynn Benz NP [facility] UROLOGY  [address]       Follow up in about 6 months (around 11/1/2023).        Peyton Hernández,  NP    Lab Frequency Next Occurrence   Ambulatory referral/consult to Rheumatology Once 07/06/2022   Ambulatory referral/consult to Ophthalmology Once 10/26/2022   US Abdomen Limited Once 10/26/2022   Ambulatory referral/consult to Rheumatology Once 11/16/2022

## 2023-05-02 NOTE — PROGRESS NOTES
Subjective:      Patient ID: Rosa Anguiano is a 42 y.o. female.    Chief Complaint: Disease Management    HPI:   Includes joint pain without subjective swelling.   Antecedent event includes: None;  Pain location includes: joints;  Gradual onset; beginning >years ago;  Constant ache, Mild in severity,   Lasting >minutes, Worse during the daytime;   Improved with rest, medication, change in position, and none;   Worsened with activity and overuse;         Review of Systems   Constitutional:  Negative for chills, fatigue and fever.   HENT:  Positive for hearing loss. Negative for nasal congestion, ear pain, mouth dryness, mouth sores, nosebleeds and trouble swallowing.    Eyes:  Positive for itching. Negative for photophobia, pain, redness, visual disturbance and eye dryness.   Respiratory:  Negative for cough and shortness of breath.    Cardiovascular:  Negative for chest pain, palpitations and leg swelling.   Gastrointestinal:  Positive for reflux. Negative for abdominal distention, abdominal pain, blood in stool, constipation, diarrhea, rectal pain, vomiting and fecal incontinence.   Endocrine: Negative for polydipsia and polyuria.   Genitourinary:  Negative for bladder incontinence, dysuria, enuresis, genital sores and hematuria.   Musculoskeletal:  Positive for arthralgias and myalgias. Negative for joint swelling.   Integumentary:  Negative for rash, wound and mole/lesion.   Neurological:  Negative for weakness and headaches.   Hematological:  Does not bruise/bleed easily.   Psychiatric/Behavioral:  Positive for sleep disturbance. Negative for dysphoric mood. The patient is not nervous/anxious.     Past Medical History:   Diagnosis Date    Acute pelvic pain, female     Breast pain     Cystitis     Diabetes mellitus     Dyspareunia, female     Endometriosis     Fibromyalgia     GERD (gastroesophageal reflux disease)     IBS (irritable bowel syndrome)     Interstitial cystitis     Irregular menstrual cycle      Leukorrhea     Osteoarthritis     Ovarian cyst     Pneumonia     YAMILET (stress urinary incontinence, female)     Vaginal yeast infection     Vulvitis      Past Surgical History:   Procedure Laterality Date     SECTION      CHOLECYSTECTOMY      LAPAROSCOPY-ASSISTED VAGINAL HYSTERECTOMY      TUBAL LIGATION        See the Assessment/Plan for further characterization of the HPI, ROS, Medical, Surgical, Family, and Social Histories including Tobacco use, Meds; all of which has been reviewed in Epic.    Medication List with Changes/Refills   Current Medications    BLOOD-GLUCOSE METER,CONTINUOUS (DEXCOM G6 ) MISC    Use as instructed    BLOOD-GLUCOSE SENSOR (DEXCOM G6 SENSOR) MARIELENA    Use as instructed    BLOOD-GLUCOSE TRANSMITTER (DEXCOM G6 TRANSMITTER) MARIELENA    Use as instructed    BORIC ACID (PH-D) 600 MG VAGINAL SUPPOSITORY    Place 1 suppository (600 mg total) vaginally every evening. As directed    CLOTRIMAZOLE-BETAMETHASONE 1-0.05% (LOTRISONE) CREAM    Apply topically 2 (two) times daily.    ESTRADIOL (ESTRACE) 0.01 % (0.1 MG/GRAM) VAGINAL CREAM    Place 1 g vaginally every Mon, Wed, Fri.    FAMOTIDINE (PEPCID) 40 MG TABLET    Take 40 mg by mouth once daily.    FLUTICASONE PROPIONATE (FLONASE) 50 MCG/ACTUATION NASAL SPRAY    2 sprays by Each Nostril route 2 (two) times daily.    HYDROCORTISONE (ANUSOL-HC) 25 MG SUPPOSITORY    Place 25 mg rectally as needed.    HYDROXYZINE PAMOATE (VISTARIL) 25 MG CAP        MELOXICAM (MOBIC) 15 MG TABLET    Take 15 mg by mouth once daily.    NYSTATIN (MYCOSTATIN) CREAM    Apply topically 2 (two) times daily.    OZEMPIC 2 MG/DOSE (8 MG/3 ML) PNIJ        PANTOPRAZOLE (PROTONIX) 40 MG TABLET        PROAIR HFA 90 MCG/ACTUATION INHALER    INL 2 PFS PO Q 4 H    PROCTOZONE-HC 2.5 % RECTAL CREAM    Place 30 g rectally as needed.   Changed and/or Refilled Medications    Modified Medication Previous Medication    HYDROXYCHLOROQUINE (PLAQUENIL) 200 MG TABLET hydrOXYchloroQUINE  "(PLAQUENIL) 200 mg tablet       Take 1 tablet (200 mg total) by mouth once daily.    Take 1 tablet (200 mg total) by mouth once daily.    PREGABALIN (LYRICA) 150 MG CAPSULE pregabalin (LYRICA) 150 MG capsule       Take 1 capsule (150 mg total) by mouth 2 (two) times daily.    Take 1 capsule (150 mg total) by mouth 2 (two) times daily.         Objective:   /83   Pulse 92   Resp 16   Ht 4' 11" (1.499 m)   Wt 74.9 kg (165 lb 3.2 oz)   SpO2 99%   BMI 33.37 kg/m²   Physical Exam  Non-toxic appearance. No distress.   Normocephalic and atraumatic.   External ears normal.    Conjunctivae and EOM are normal. No scleral icterus.   OP clear, moist.  No salivary gland enlargement or tenderness.  No submental, no submandibular, no preauricular, nor cervical adenopathy.   No JVD. No carotid bruit. No thyromegaly.   RRR, No friction rub; palpable distal pulses.    No tachypnea or signs of respiratory distress.   Abdominal: No guarding or rebound tenderness.   Neurological: Oriented. Normal thought content.   Skin is warm. No pallor.   Musculoskeletal: see below for further input.     [facility] UNKNOWN RAD EAP                                RADIOLOGY REPORT        PT NAME: BECKI ESCOBAR [facility]     : 1980 F 42             [address]    ACCT: [id number]                                              [facility] Rec #: [id number]                                        [id number]    Patient Location: [place].RADMRI/             Procedure: LOWER EXT FOOT WWO    REQUISITION #: [id number]      REPORT #: [id number]           DATE OF EXAM: 23    TIME OF EXAM: 1030       MRI Foot        CLINICAL HISTORY: Left foot pain with numbness within the toes.        TECHNIQUE: Multiplanar, multisequential images are acquired through the left   foot before and after the administration of 15 cc ProHance gadolinium   contrast. No contrast discarded.        COMPARISON: None.        FINDINGS:      "   No abnormal bone marrow signal intensity within the foot. Specifically, no   evidence of acute fracture, osteonecrosis, or osteomyelitis.        The Lisfranc ligament is intact.        Please note that this examination was not tailored to assess for internal   arrangement at the level of the ankle though the visualized tendinous and   ligamentous structures are grossly intact.        IMPRESSION:        No MR abnormality of the left foot.        DICTATING PHYSICIAN:  JUDY GRADY MD                   Date Dictated: 01/31/23 1222        Signed By:  JUDY GRADY MD <Electronically signed by JUDY GRADY MD in    OV>    Date Signed:  01/31/23 1226     CC: SKYE DENNISON MD ; SKYE DENNISON MD      ADMITTING PHYSICIAN:                                                                                                    ORDERING PHY: SKYE DENNISON MD                                                                                                                                                      ATTENDING PHY: SKYE DENNISON MD    Patient Status:  REG CLI    Admit Service Date: 01/31/23         Office Visit on 05/01/2023   Component Date Value Ref Range Status    POC Glucose 05/01/2023 98  70 - 110 MG/DL Final   Office Visit on 02/23/2023   Component Date Value Ref Range Status    POC Glucose 02/23/2023 91  70 - 110 MG/DL Final    Urine Culture, Routine 02/23/2023    Final    Comment: Source: URINE  Site:  Organism #1                    MIXED MICHAEL, NO SUSCEPTIBILITY  Quantity                      15,000    Mixed growth in a urine culture is consistant with normal  urogenital/urethral and/or colonizing bacterial michael.  There are  several different types of bacteria present, which is usually  indicative of contamination of the urine.  NOTE  Testing performed at:  [facility], [address] [place] [address]  [address] CLIA #:[id number]     Office Visit on 12/05/2022   Component Date Value Ref Range Status     Color, UA 12/05/2022 Yellow   Final    pH, UA 12/05/2022 6.0   Final    WBC, UA 12/05/2022 Negative   Final    Nitrite, UA 12/05/2022 Negative   Final    Protein, POC 12/05/2022 Negative   Final    Glucose, UA 12/05/2022 Negative   Final    Ketones, UA 12/05/2022 Negative   Final    Urobilinogen, UA 12/05/2022 0.2   Final    Bilirubin, POC 12/05/2022 Negative   Final    Blood, UA 12/05/2022 Negative   Final    Clarity, UA 12/05/2022 Clear   Final    Spec Grav UA 12/05/2022 1.010   Final    Hemoglobin A1C 12/07/2022 6.0  4.0 - 6.0 % Final    EST AVERAGE GLUCOSE 12/07/2022 127 (H)  NORMAL MG/DL Final    Comment: NOTE  Testing performed at:  [facility], [address] [place] [address] CLIA #:[id number]      Lipase 12/07/2022 38  13 - 60 U/L Final    Comment: NOTE  Testing performed at:  [facility], [address] [place] [address]  [address] CLIA #:[id number]      Multiple Orders 12/07/2022 SEE BELOW   Final    Comment: Your patient has submitted more than one order to us. The orders are  from different physicians. The different orders include some of the  same tests. You may receive results from tests you did not order.  Orders given to us in this manner are only submitted to insurance once  and a copy of results are sent to each physician. The resulting  process may take several days for all tests to be completed on the  separate accession numbers.  NOTE  Testing performed at:  [facility], [address] [place] [address]  [address] CLIA #:[id number]     Office Visit on 11/16/2022   Component Date Value Ref Range Status    POC Residual Urine Volume 11/16/2022 35  0 - 100 mL Final   Patient Outreach on 11/09/2022   Component Date Value Ref Range Status    Left Eye DM Retinopathy 11/08/2022 Negative   Final    Right Eye DM Retinopathy 11/08/2022 Negative   Final   Orders Only on 10/28/2022   Component Date Value Ref Range Status    CPK 10/28/2022 67  26 - 192 U/L Final     Comment: NOTE  Testing performed at:  [facility], [address] [place] [address]  [address] CLIA #:[id number]      Glucose 10/28/2022 96  74 - 106 mg/dL Final    BUN 10/28/2022 9.7  6 - 20 mg/dL Final    Creatinine 10/28/2022 0.62  0.50 - 0.90 mg/dL Final    AST 10/28/2022 26  0 - 32 U/L Final    ALT (SGPT) 10/28/2022 45 (H)  0 - 33 U/L Final    Alkaline Phosphatase 10/28/2022 91  35 - 105 U/L Final    Calcium 10/28/2022 9.4  8.6 - 10.2 mg/dL Final    Protein, Total 10/28/2022 7.3  6.4 - 8.3 g/dL Final    Albumin 10/28/2022 4.2  3.5 - 5.2 g/dL Final    BILIRUBIN, TOTAL 10/28/2022 0.27  0.00 - 1.20 mg/dL Final    Sodium 10/28/2022 139  136 - 145 mmol/L Final    Potassium 10/28/2022 4.0  3.5 - 5.1 mmol/L Final    Chloride 10/28/2022 102  98 - 107 mmol/L Final    CO2 10/28/2022 26  22 - 29 mmol/L Final    Globulin 10/28/2022 3.1  1.5 - 4.5 g/dL Final    Albumin/Globulin Ratio 10/28/2022 1.4  1.0 - 2.7 Final    BUN/Creatinine Ratio 10/28/2022 15.6  6 - 20 Final    GFR ESTIMATION 10/28/2022 106.02  >60.00 Final    Anion Gap 10/28/2022 11.0  8.0 - 17.0 mmol/L Final    Comment: NOTE  Testing performed at:  [facility], [address] [place] [address]  [address] CLIA #:[id number]      CRP QUANTITATIVE 10/28/2022 3.8  <5.0 mg/L Final    Comment: Significantly decreased CRP values may be obtained from samples taken  from patients who have been treated with carboxypenicillins.      CRP QUALITATIVE 10/28/2022 NEGATIVE  NEGATIVE mg/L Final    Comment: NOTE  Testing performed at:  [facility], [address] [place] [address]  [address] CLIA #:[id number]      SED RATE ([facility]) 10/28/2022 17  0 - 20 mm/hr Final    Comment: NOTE  Testing performed at:  [facility], [address] [place]  [address] CLIA #:[id number]      WBC 10/28/2022 6.79  4.3 - 10.8 X 10 3/ul Final    RBC 10/28/2022 4.30  4.2 - 5.4 X 10 6/ul Final    RDW-SD 10/28/2022 40.1  37 - 54 fl Final    Hemoglobin  10/28/2022 13.2  12 - 16 g/dL Final    Hematocrit 10/28/2022 38.8  37 - 47 % Final    MCV 10/28/2022 90.2  82 - 100 fl Final    MCH 10/28/2022 30.7  27 - 32 pg Final    MCHC 10/28/2022 34.0  32 - 36 g/dL Final    Platelets 10/28/2022 298  135 - 400 X 10 3/ul Final    Neutrophils 10/28/2022 43.5  34 - 71.1 % Final    Lymphocytes 10/28/2022 48.2  19.3 - 53.1 % Final    Monocytes 10/28/2022 6.9  4.7 - 12.5 % Final    Eosinophils 10/28/2022 0.7  0.7 - 7.0 % Final    Basophils 10/28/2022 0.4  0.2 - 1.2 % Final    Neutrophils Absolute 10/28/2022 2.95  2.15 - 7.56 X 10 3/ul Final    Lymphocytes Absolute 10/28/2022 3.27  0.86 - 4.75 X 10 3/ul Final    Monocytes Absolute 10/28/2022 0.47  0.22 - 1.08 X 10 3/ul Final    Eosinophils Absolute 10/28/2022 0.05  0.04 - 0.54 X 10 3/ul Final    Basophils Absolute 10/28/2022 0.03  0.00 - 0.22 X 10 3/ul Final    Immature Granulocytes Absolute 10/28/2022 0.02  0 - 0.04 X 10 3/ul Final    Immature Granulocytes 10/28/2022 0.3  0 - 0.5 % Final    IG includes metamyelocytes, myelocytes, and promyelocytes    nRBC# 10/28/2022 0.0  0 - 0.2 /100 WBC Final    nRBC Count Absolute 10/28/2022 0.000  0 - 0.012 x 10 3/ul Final    Comment: NOTE  Testing performed at:  [facility], [address] [place] [address]  [address] CLIA #:[id number]      Color, UA 10/28/2022 GREEN   Final    Clarity, UA 10/28/2022 HAZY   Final    Specific Gravity,UA 10/28/2022 1.015  1.005 - 1.030 Final    pH, Urine 10/28/2022 5  5 - 7.5 Final    Leukocytes, UA 10/28/2022 NEGATIVE  NEGATIVE Final    Nitrite, Urine 10/28/2022 NEGATIVE  NEGATIVE Final    Protein, UA 10/28/2022 NEGATIVE  NEGATIVE mg/dL Final    Glucose, UA 10/28/2022 NEGATIVE  NEGATIVE mg/dL Final    Ketones, UA 10/28/2022 NEGATIVE  NEGATIVE mg/dL Final    Urobilinogen, urine 10/28/2022 NORMAL  0 - 1.0 E.U./dL Final    Bilirubin (UA) 10/28/2022 NEGATIVE  NEGATIVE Final    Occult Blood 10/28/2022 NEGATIVE  NEGATIVE Final    WBC/HPF 10/28/2022 0-5  <5  Final    RBC/HPF 10/28/2022 0-5  <5 Final    Amorphous, UA 10/28/2022 NEGATIVE   Final    Bacteria, UA 10/28/2022 TRACE  NEG-TRACE Final    Epithelial Cells 10/28/2022 FEW  NEGATIVE-FEW Final    Mucus, UA 10/28/2022 NEGATIVE  NEGATIVE Final    Comment: NOTE  Testing performed at:  [facility], [address] [place] [address]  [address] CLIA #:[id number]      Rheumatoid Arthritis Factor 10/28/2022 POSITIVE (A)  NEGATIVE IU/mL Final    Rheumatoid Arthritis, Qn/Fluid 10/28/2022 18.4 (H)  <14.0 IU/mL Final    Comment: NOTE  Testing performed at:  [facility], [address] [place] [address]  [address] CLIA #:[id number]     Patient Outreach on 10/13/2022   Component Date Value Ref Range Status    BCS Recommendation External 09/22/2022 Repeat mammogram in 1 year   Final   Office Visit on 07/26/2022   Component Date Value Ref Range Status    B12 07/27/2022 1,122  232 - 1,245 pg/mL Final    Comment: NOTE  Testing performed at:  [facility], [address] [place] [address]  [address] CLIA #:[id number]      Methylmalonic Acid 07/27/2022 108  87 - 318 nmol/L Final    Comment:   This test was developed and its analytical performance  characteristics have been determined by [facility]. It has not been  cleared or approved by FDA. This assay has been validated  pursuant to the CLIA regulations and is used for clinical  purposes.  NOTE  Testing performed at:  [facility], [address] [place], [place] [address] CLIA#:[id number]      WBC 07/27/2022 8.54  4.3 - 10.8 X 10 3/ul Final    RBC 07/27/2022 4.36  4.2 - 5.4 X 10 6/ul Final    RDW-SD 07/27/2022 42.4  37 - 54 fl Final    Hemoglobin 07/27/2022 13.3  12 - 16 g/dL Final    Hematocrit 07/27/2022 39.2  37 - 47 % Final    MCV 07/27/2022 89.9  82 - 100 fl Final    MCH 07/27/2022 30.5  27 - 32 pg Final    MCHC 07/27/2022 33.9  32 - 36 g/dL Final    Platelets 07/27/2022 292  135 - 400 X 10  3/ul Final    Neutrophils 07/27/2022 53.1  34 - 71.1 % Final    Lymphocytes 07/27/2022 38.6  19.3 - 53.1 % Final    Monocytes 07/27/2022 7.0  4.7 - 12.5 % Final    Eosinophils 07/27/2022 0.5 (L)  0.7 - 7.0 % Final    Basophils 07/27/2022 0.4  0.2 - 1.2 % Final    Neutrophils Absolute 07/27/2022 4.54  2.15 - 7.56 X 10 3/ul Final    Lymphocytes Absolute 07/27/2022 3.30  0.86 - 4.75 X 10 3/ul Final    Monocytes Absolute 07/27/2022 0.60  0.22 - 1.08 X 10 3/ul Final    Eosinophils Absolute 07/27/2022 0.04  0.04 - 0.54 X 10 3/ul Final    Basophils Absolute 07/27/2022 0.03  0.00 - 0.22 X 10 3/ul Final    Immature Granulocytes Absolute 07/27/2022 0.03  0 - 0.04 X 10 3/ul Final    Immature Granulocytes 07/27/2022 0.4  0 - 0.5 % Final    IG includes metamyelocytes, myelocytes, and promyelocytes    nRBC# 07/27/2022 0.0  0 - 0.2 /100 WBC Final    nRBC Count Absolute 07/27/2022 0.000  0 - 0.012 x 10 3/ul Final    Comment: NOTE  Testing performed at:  [facility], [address] [place] [address]  [address] CLIA #:[id number]      Additional Testing 07/27/2022 SEE BELOW   Final    Comment: Additional testing was collected and sent to a reference lab. Results  may take 7 days to 2 weeks before they are final. Reports will be sent  to ordering client via fax, pathviewer, mail, interface or   delivered.  NOTE  Testing performed at:  [facility], [address] [place] [address]  [address] CLIA #:[id number]     Patient Outreach on 07/18/2022   Component Date Value Ref Range Status    Cholesterol 07/14/2022 216 (H)  100 - 200 mg/dL Final    Triglycerides 07/14/2022 141  0 - 150 mg/dL Final    HDL 07/14/2022 50 (L)  >60 Final    LDL Cholesterol 07/14/2022 138 (H)  0 - 100 mg/dL Final    LDL/HDL Ratio 07/14/2022 2.8  1.0 - 3.0 Final    Hemoglobin A1C 06/01/2022 6.6 (H)  4.0 - 6.0 % Final    Glucose 06/01/2022 103  74 - 106 mg/dL Final    BUN 06/01/2022 9.9  6 - 20 mg/dL Final    Creatinine 06/01/2022 0.5  0.5 - 0.9  mg/dL Final    AST 06/01/2022 16  0 - 32 U/L Final    ALT 06/01/2022 28  0 - 33 U/L Final    Alkaline Phosphatase 06/01/2022 91  35 - 105 U/L Final    Calcium 06/01/2022 9.1  8.6 - 10.2 mg/dL Final    Total Protein 06/01/2022 7.6  6.4 - 8.3 g/dL Final    Albumin 06/01/2022 4.4  3.5 - 5.2 g/dL Final    Total Bilirubin 06/01/2022 0.4  0.0 - 1.2 mg/dL Final    Sodium 06/01/2022 138  136 - 145 mmol/L Final    Potassium 06/01/2022 3.8  3.5 - 5.1 mmol/L Final    Chloride 06/01/2022 104  98 - 107 mmol/L Final    CO2 06/01/2022 25  22 - 29 mmol/L Final    Globulin 06/01/2022 3.2  1.5 - 4.5 Final    Albumin/Globulin Ratio 06/01/2022 1.4  1.0 - 2.7 Final    BUN/CREAT RATIO 06/01/2022 18.7  6 - 20 Final    GFR ESTIMATION 06/01/2022 126.51  >60.00 Final    Anion Gap 06/01/2022 9  8 - 17 mmol/L Final   Office Visit on 07/18/2022   Component Date Value Ref Range Status    Color, UA 07/18/2022 Green/Blue   Final    pH, UA 07/18/2022 5.5   Final    WBC, UA 07/18/2022 Negative   Final    Nitrite, UA 07/18/2022 Negative   Final    Protein, POC 07/18/2022 Negative   Final    Glucose, UA 07/18/2022 Negative   Final    Ketones, UA 07/18/2022 Negative   Final    Urobilinogen, UA 07/18/2022 0.2   Final    Bilirubin, POC 07/18/2022 Negative   Final    Blood, UA 07/18/2022 Trace-Intact   Final    Clarity, UA 07/18/2022 Clear   Final    Spec Grav UA 07/18/2022 1.030   Final    Urine Culture, Routine 07/18/2022    Final    Comment: Source: URINE  Site:  Organism #1                    MIXED ORGANISMS, NO SUSCEPTIBILITY  Quantity                      25,000    Mixed ilene in a urine culture means there are several different types  of bacteria present, which is usually indicative of contamination of  the urine.  NOTE  Testing performed at:  [facility], [address] [place] [address]  [address] CLIA #:[id number]     There may be more visits with results that are not included.        All of the data above and below has been reviewed by  myself and any further interpretations will be reflected in the assessment and plan.   The data includes review of external notes, and independent interpretation of lab results, x-rays, and imaging reports.  Active inflammatory arthritis is an illness that poses a threat to bodily function and even a threat to life in some cases.  Drug therapy to treat inflammatory arthritis usually requires intensive monitoring for toxicity.    Review of patient's allergies indicates:   Allergen Reactions    Nabumetone Other (See Comments)     Diffculty swallowing     Assessment/Plan:          Prev noted/prior plan:  (No overt synovitis     Verbal education     Likely mechanical and myofascial + neurogenic pain;  No overt active inflammatory arthritis but she had been on treatment MTX per patient     Blood work to further evaluate     We will seek records from Dr. Wright and records from [facility]))     Visit prior to Visit prior to Visit prior to Last visit:     Interim lab:     HepBcoreAb neg     Creat 0.79; alb 4.9     AST 49  ALT 90       WBC 9.9; Hgb 14.1; plt 340     UA SG 1.02 moderate epi cells     C3 220  C4 29     SPEP beta little high alpha2 little high     CPK 52           JACOB neg RF 17.9+ CCP neg HepBsAb+sAgneg coreAb neg HCV neg uric acid 5.4     2022 TB negative     ESR 24; CRP 15.2;         Interim records from Dr. Wright some of note 5/26/22: history fibromyalgia and DJD; Opiates help with qol; no active synovitis; 14/18 trigger points; A/P: fibromyalgia and DJD; continue lyrica 150mg; RF+ ACR90 with Methotrexate; Gout is a form of arthritis; Lab; revisit 3 months (I do see Methotrexate 2.5mg on a med list but nothing mentioned in the note other than what I wrote and gout also mentioned with patient education? Also was given depomedrol at some point? Cymbalta and lyrica noted; I don't see what opioid she was prescribed?)        Interim records  from [facility] at [facility]: some of note 7/19/22  left elbow pain f/u; prior injection helped 2 months; will repeat left lateral epicondyle injection; revisit 6 months     8/3/22 MRI cervical: multilevel DJD DDD worse C5-6 C6-7        Prior plan/prev noted:   Lyrica 150mg bid   Prior voltaren 50mg we will resume   Prior skelaxin  Resume voltaren  Addendum 8/10/22 2:50pm: she called stating the lyrica no longer works for her so she is maybe going to stop it. Noted)           Went over lab including RF and LFTs     Went over records     She only used the MTX briefly with Dr. Wright less than a month and she tells me he stopped it     She did stop the lyrica completely interim and then resumed it recently again because it was helping with some pain in face her PCP told her was from shingles     She may be using the diclofenac?     She has some interim symptoms of inflammatory arthritis     No overt synovitis     Verbal education and some written     Add plaquenil 200mg daily with referral to Ophthalmology     liver US to further evaluate the LFTs     Cont/resume diclofenac 50mg bid prn     Cont lyrica 150mg bid     Cymbalta 20mg daily noted     Call if not improving or question     At the end of visit she asks about if she also stop Methotrexate? which I understood she was no longer using? and she clarified rather not using but not sure and telling me she needs more sleep noted     Revisit lab 2 weeks prior     Contact us prn        Visit prior to Visit prior to Last visit:     Interim lab 10/28/22:     CPK 87     AST 26 (prior 49)  ALT 45 (prior 90)     Creat 0.62; alb 4.2     WBC 6.8; Hgb 13.2; plt 298     UA SG 1.015 few cells     RF18.4+     ESR 17; CRP 3.8     Interim liver US 9/30/22:   Liver: The liver demonstrates normal echogenicity with no focal lesions are    detected. The main portal vein demonstrates normal hepatopedal flow.      Gallbladder: Cholecystectomy  change.     Biliary tree: The common bile duct measures a normal 2 mm in diameter.     Pancreas: The visualized pancreas appears normal.    Kidneys: The right kidney measures 11.3 x 5.3 x 4.8 cm with no   hydronephrosis.    Impression:    1.  No acute findings in the abdomen.  ))           Prev noted/prior plan:  (Prior plan/prev noted:   Lyrica 150mg bid   Prior voltaren 50mg we will resume   Prior skelaxin  Resume voltaren  Addendum 8/10/22 2:50pm: she called stating the lyrica no longer works for her so she is maybe going to stop it. Noted)  Went over lab including RF and LFTs  Went over records  She only used the MTX briefly with Dr. Wright less than a month and she tells me he stopped it  She did stop the lyrica completely interim and then resumed it recently again because it was helping with some pain in face her PCP told her was from shingles  She may be using the diclofenac?  She has some interim symptoms of inflammatory arthritis  No overt synovitis  Verbal education and some written  Add plaquenil 200mg daily with referral to Ophthalmology  liver US to further evaluate the LFTs  Cont/resume diclofenac 50mg bid prn  Cont lyrica 150mg bid  Cymbalta 20mg daily noted  Call if not improving or question   At the end of visit she asks about if she also stop Methotrexate? which I understood she was no longer using? and she clarified rather not using but not sure and telling me she needs more sleep noted)        She has interim pain in hands cervical lumbar and elbow     Looks like she used prednisone 20mg daily for 5 days weeks ago from PCP for interim back pain noted; She should also contact us if symptoms of inflammatory arthritis in future     Looks like cymbalta increased 30mg interim noted     She tells me she is not sure about her meds noted; she could bring a list but I think we have updated things?     She can also call us moving forward; Jill has a direct number and can also contact me through the portal;       No overt synovitis but some interim prednisone noted; she tells me she had a lot of joint swelling prior to the prednisone noted     Verbal education  Went over lab     xrays to further evaluate at her convenience     She should call is recurs/persists  As per above     Will also keep close eye on her given interim reported history     Cont/refill meds:  Plaquenil 200mg daily  Diclofenac 50mg bid prn  Lyrica 150mg bid     Cymbalta 30mg noted     Revisit lab 2 weeks prior     Contact us prn      Visit prior to Last visit:     Interim lab 12/21/22:     UA SG 1.022 light green 1+ turbid and trace protein; 5-10 WBC few epi cells     CPK 55     Creat 0.62; alb 4     WBC 7.4; Hgb 12.5; plt 298        ESR 19 CRP<0.5     Interim 12/21/22 xray reports:     Cervical normal  Lumbar: slight dextroscoliosis and mild DJD  CXR normal  Knees normal  Pelvis/hips: probable small bone island subtrochanteric region left femur  Hands: normal  Feet: small plantar and posterior calcaneal spurs           Prev noted/prior plan:  (She has interim pain in hands cervical lumbar and elbow   Looks like she used prednisone 20mg daily for 5 days weeks ago from PCP for interim back pain noted; She should also contact us if symptoms of inflammatory arthritis in future   Looks like cymbalta increased 30mg interim noted     She tells me she is not sure about her meds noted; she could bring a list but I think we have updated things?     She can also call us moving forward; Jill has a direct number and can also contact me through the portal;      No overt synovitis but some interim prednisone noted; she tells me she had a lot of joint swelling prior to the prednisone noted   Verbal education  Went over lab   xrays to further evaluate at her convenience   She should call is recurs/persists  As per above   Will also keep close eye on her given interim reported history   Cont/refill meds:  Plaquenil 200mg daily  Diclofenac 50mg bid prn  Lyrica 150mg  bid   Cymbalta 30mg noted)     She put herself on the schedule yesterday without getting lab or xrays or contacting us? We are here for her.     She had been working with PCP interim left ear pain was to revisit ENT; abdominal pain to have CT abdomen; some interim back pain mobic added to diclofenac (careful should not combine) and flexeril added; also had chest congestion cough and URI and was to have CXR per interim notes in Epic     She had missed after putting herself on schedule last month noted     She has some interim symptoms of inflammatory arthritis; she should call us we are here for her     She tells me she has the number but was not sure and then had trouble messaging me and Jill can help her moving forward     No longer using cymbalta it does not agree with her maybe caused her to 'hallucinate' that is now better noted     Her left foot and toes are most symptomatic      She does not feel the diclofenac helps at all noted     No overt synovitis but possibly some interim treatment? Tells me no recent interim steroids     Verbal education     Went over lab and xrays        Consider MRI left foot toes to further evaluate for inflammatory arthritis and the chronic pain     She should also update  about the spine pain and other     She will consider doing more PT; and her insurance may require her to     Change diclofenac to nabumetone 750mg bid prn     Lyrica 150mg bid      Plaquenil 200mg daily     Call us if not improving        Revisit lab 2 weeks prior     Contact us prn     Addendum 1/26/23: She called Jill letting her know the nabumetone does not agree with her causes her throat to close and difficult swallowing so she is avoiding stopping forward     Last visit:     Interim lab 2/6/23:     CPK 38  Creat 0.76; alb 4.3     WBC 11.29; Hgb 13.2; plt 292     ALC 4.81 high     UA SG 1.025 moderate epi and calcium oxalate     RF 17+           ESR 17; CRP<3     Interim 01/31/23 MRI toes:       No  abnormal bone marrow signal intensity within the foot. Specifically, no   evidence of acute fracture, osteonecrosis, or osteomyelitis.      The Lisfranc ligament is intact.     Please note that this examination was not tailored to assess for internal   arrangement at the level of the ankle though the visualized tendinous and   ligamentous structures are grossly intact.      IMPRESSION:      No MR abnormality of the left foot.                Prev noted/prior plan:  (She has interim pain in hands cervical lumbar and elbow   Looks like she used prednisone 20mg daily for 5 days weeks ago from PCP for interim back pain noted; She should also contact us if symptoms of inflammatory arthritis in future   Looks like cymbalta increased 30mg interim noted     She tells me she is not sure about her meds noted; she could bring a list but I think we have updated things?     She can also call us moving forward; Jill has a direct number and can also contact me through the portal;      No overt synovitis but some interim prednisone noted; she tells me she had a lot of joint swelling prior to the prednisone noted   Verbal education  Went over lab   xrays to further evaluate at her convenience   She should call is recurs/persists  As per above   Will also keep close eye on her given interim reported history   Cont/refill meds:  Plaquenil 200mg daily  Diclofenac 50mg bid prn  Lyrica 150mg bid   Cymbalta 30mg noted)     Prev noted/prior plan:   (She put herself on the schedule yesterday without getting lab or xrays or contacting us? We are here for her.     She had been working with PCP interim left ear pain was to revisit ENT; abdominal pain to have CT abdomen; some interim back pain mobic added to diclofenac (careful should not combine) and flexeril added; also had chest congestion cough and URI and was to have CXR per interim notes in Epic     She had missed after putting herself on schedule last month noted     She has some interim  symptoms of inflammatory arthritis; she should call us we are here for her     She tells me she has the number but was not sure and then had trouble messaging me and Jill can help her moving forward     No longer using cymbalta it does not agree with her maybe caused her to 'hallucinate' that is now better noted     Her left foot and toes are most symptomatic      She does not feel the diclofenac helps at all noted     No overt synovitis but possibly some interim treatment? Tells me no recent interim steroids     Verbal education     Went over lab and xrays      Consider MRI left foot toes to further evaluate for inflammatory arthritis and the chronic pain     She should also update  about the spine pain and other     She will consider doing more PT; and her insurance may require her to     Change diclofenac to nabumetone 750mg bid prn     Lyrica 150mg bid    Plaquenil 200mg daily   Call us if not improving  Addendum 1/26/23: She called Jill letting her know the nabumetone does not agree with her causes her throat to close and difficult swallowing so she is avoiding stopping forward)        She has some interim pain in legs feet and elbows     I understood she was no longer using the nabumetone? She is rather not     She has rather clarifies intermittent pain possible flares she can call us     Recently mostly pain free; will notice her legs will move on their own and feel 'restless' noted     No overt synovitis        Went over lab and MRI     I am not seeing active inflammatory arthritis on exam or lab noted.     Change nabumetone to naprosyn 500mg bid prn     She should revisit [facility]opedics [facility] if needed     Lyrica 150mg bid       Plaquenil 200mg daily      Call if question or flare we can see her sooner    This visit:    Interim lab 5/9/23:      CPK 84    Creat 0.56; alb 4.3    WBC 7.5; Hgb 13.5; plt 304    UA SG 1.025 moderate epi cells rare cell amorphous sediment          ESR  11; CRP 3.1      Prev noted/prior plan:  (She has interim pain in hands cervical lumbar and elbow   Looks like she used prednisone 20mg daily for 5 days weeks ago from PCP for interim back pain noted; She should also contact us if symptoms of inflammatory arthritis in future   Looks like cymbalta increased 30mg interim noted     She tells me she is not sure about her meds noted; she could bring a list but I think we have updated things?     She can also call us moving forward; Jill has a direct number and can also contact me through the portal;      No overt synovitis but some interim prednisone noted; she tells me she had a lot of joint swelling prior to the prednisone noted   Verbal education  Went over lab   xrays to further evaluate at her convenience   She should call is recurs/persists  As per above   Will also keep close eye on her given interim reported history   Cont/refill meds:  Plaquenil 200mg daily  Diclofenac 50mg bid prn  Lyrica 150mg bid   Cymbalta 30mg noted)     Prev noted/prior plan:   (She put herself on the schedule yesterday without getting lab or xrays or contacting us? We are here for her.     She had been working with PCP interim left ear pain was to revisit ENT; abdominal pain to have CT abdomen; some interim back pain mobic added to diclofenac (careful should not combine) and flexeril added; also had chest congestion cough and URI and was to have CXR per interim notes in Epic     She had missed after putting herself on schedule last month noted     She has some interim symptoms of inflammatory arthritis; she should call us we are here for her     She tells me she has the number but was not sure and then had trouble messaging me and Jill can help her moving forward     No longer using cymbalta it does not agree with her maybe caused her to 'hallucinate' that is now better noted     Her left foot and toes are most symptomatic      She does not feel the diclofenac helps at all noted     No  overt synovitis but possibly some interim treatment? Tells me no recent interim steroids     Verbal education     Went over lab and xrays      Consider MRI left foot toes to further evaluate for inflammatory arthritis and the chronic pain     She should also update  about the spine pain and other     She will consider doing more PT; and her insurance may require her to     Change diclofenac to nabumetone 750mg bid prn     Lyrica 150mg bid    Plaquenil 200mg daily   Call us if not improving  Addendum 1/26/23: She called Jill letting her know the nabumetone does not agree with her causes her throat to close and difficult swallowing so she is avoiding stopping forward)     Prev noted/prior plan:   (She has some interim pain in legs feet and elbows     I understood she was no longer using the nabumetone? She is rather not     She has rather clarifies intermittent pain possible flares she can call us     Recently mostly pain free; will notice her legs will move on their own and feel 'restless' noted     No overt synovitis      Went over lab and MRI     I am not seeing active inflammatory arthritis on exam or lab noted.     Change nabumetone to naprosyn 500mg bid prn     She should revisit [facility]opedics [facility] if needed     Lyrica 150mg bid       Plaquenil 200mg daily)        She has some interim arthralgias and muscle spasm but sounds like managing fairly well  Looks like naprosyn changed to mobic interim with PCP    She tells me she has also been working with Orthopedics for left tennis elbow s/p more than one injection and considering surgery noted    No overt synovitis but some interim steroid injection noted      Went over lab  I am not seeing active inflammatory arthritis or connective tissue disease on exam noted.      Lyrica 150mg bid    Plaquenil 200mg daily   No longer using cymbalta it does not agree with her maybe caused her to 'hallucinate' that is now better prev noted     Mobic  15mg daily prn works better than prior naprosyn prior voltaren prior nabumetone noted    See prior     Revisit lab 2 weeks prior     Contact us prn     JACOB neg RF 17.9+ repeat RF 17+ CCP neg HepBsAb+sAgneg coreAb neg HCV neg uric acid 5.4  There is some history to suggest inflammatory arthritis.     Rheumatoid arthritis and fibromyalgia per records     12/21/22 xray reports:   Cervical normal  Lumbar: slight dextroscoliosis and mild DJD  CXR normal  Knees normal  Pelvis/hips: probable small bone island subtrochanteric region left femur  Hands: normal  Feet: small plantar and posterior calcaneal spurs      1/31/23 MRI toes:       No abnormal bone marrow signal intensity within the foot. Specifically, no   evidence of acute fracture, osteonecrosis, or osteomyelitis.      The Lisfranc ligament is intact.     Please note that this examination was not tailored to assess for internal   arrangement at the level of the ankle though the visualized tendinous and   ligamentous structures are grossly intact.      IMPRESSION:      No MR abnormality of the left foot.      2022 TB negative     previously followed by Dr. Wright      She tells me Dr. Wright would not refill her diclofenac for unknown reason; no known kidney problem prev noted     She sounds like she was also started on MTX 4/week with folic acid that did not help so she stopped it but then she was back and forth about this previously noted     records from Dr. Wright some of note 5/26/22: history fibromyalgia and DJD; Opiates help with qol; no active synovitis; 14/18 trigger points; A/P: fibromyalgia and DJD; continue lyrica 150mg; RF+ ACR90 with Methotrexate; Gout is a form of arthritis; Lab; revisit 3 months   (I do see Methotrexate 2.5mg on a med list but nothing mentioned in the note other than what I wrote and gout also mentioned with patient education? Also was given depomedrol at some point? Cymbalta and lyrica noted; I don't see what opioid she was  prescribed?)     records from [facility] [place] [facility] [place] of note 7/19/22  left elbow pain f/u; prior injection helped 2 months; will repeat left lateral epicondyle injection; revisit 6 months     8/3/22 MRI cervical: multilevel DJD DDD worse C5-6 C6-7      There is also some mechanical and neurogenic pain.     She has chronic numbness in head that she was told was from shingles on 4 different occasions and had treatment but never had a rash or skin lesion noted     Chronic neck and back pain     She tells me she is also working with Physical Medicine and Rehab [facility] if not already     Prior CHERI that did not help     Also had seen Orthopedics at [facility] [place]/p elbow injection per patient     Prev noted/prior plan:  (No overt synovitis   Verbal education   Likely mechanical and myofascial + neurogenic pain;  No overt active inflammatory arthritis but she had been on treatment MTX per patient   Blood work to further evaluate   We will seek records from Dr. Wright and records from [facility]opedics and [facility] [place] [facility] [place]   Resume voltaren))      Has been managing with:     Lyrica 150mg bid     Prior voltaren 50mg we will resume     Prior skelaxin     Prior Methotrexate     Prior steroids oral and injections     Has been working with:     Records Tameka PHILIP some of note 7/27/22:  ((Follow-up (Patient here with c/o back pain, generalized body aches, and skin pain (burning sensation.//1. Back pain-awaiting authorization for MRI's/2. Generalized body aches-patient currently seeing Dr. Wright, but is just being given pain medications instead of a treatment plan per patient. She is seeing Cristobal in August to establish care./3. Skin pain-has been dx with shingles in the past with no lesions/rash. Verbalizes the pain is the same.), Back Pain, Generalized Body Aches, and burning sensation to skin     HPI   Patient is here today for follow-up and new complaints.     Patient  reports burning sensation and stinging sensation over her skin.  Reports she has a history of fibromyalgia and RA.  She was seen by Dr. Wright in the past but has an upcoming appointment with Dr. Jain in August.  Patient currently takes Lyrica.     Neck and back pain-patient reports chronic pain and muscle tension in her neck and back.  She is awaiting approval for 2 are eyes.  She currently takes Mobic daily without relief.     Pt has completed 8 weeks of physical therapy for her neck and back without relief.       Assessment & Plan:      Neuropathic pain  -     Vitamin B12; Future; Expected date: 07/26/2022  -     Methylmalonic Acid, Serum; Future; Expected date: 07/26/2022  -     CBC Auto Differential; Future; Expected date: 07/26/2022     Rheumatoid arthritis involving multiple sites with positive rheumatoid factor  Comments:  Keep follow-up with Rheumatology     Neck pain  -     metaxalone (SKELAXIN) 800 MG tablet; Take 1 tablet (800 mg total) by mouth 3 (three) times daily. for 10 days  Dispense: 30 tablet; Refill: 0  -     diclofenac (VOLTAREN) 50 MG EC tablet; 1 tab 2-3 times a day with a meal  Dispense: 90 tablet; Refill: 1     Back pain, unspecified back location, unspecified back pain laterality, unspecified chronicity  -     metaxalone (SKELAXIN) 800 MG tablet; Take 1 tablet (800 mg total) by mouth 3 (three) times daily. for 10 days  Dispense: 30 tablet; Refill: 0  -     diclofenac (VOLTAREN) 50 MG EC tablet; 1 tab 2-3 times a day with a meal  Dispense: 90 tablet; Refill: 1))     Review of medical records as stated above.  Lab +/- imaging and other ordered diagnostic studies to further evaluate.  See the orders associated with this note visit.  Medications as prescribed as tolerated.    Education including verbal and those noted in the patient instructions.  Revisit as scheduled and call prn.    The following diagnoses influence medical decision making and/or need further workup including the orders  listed below:    Rheumatoid arthritis involving multiple sites with positive rheumatoid factor  -     CBC Auto Differential; Future; Expected date: 09/01/2023  -     CK; Future; Expected date: 09/01/2023  -     Comprehensive Metabolic Panel; Future; Expected date: 09/01/2023  -     C-Reactive Protein; Future; Expected date: 09/01/2023  -     Sedimentation rate; Future; Expected date: 09/01/2023  -     Urinalysis; Future; Expected date: 09/01/2023  -     hydrOXYchloroQUINE (PLAQUENIL) 200 mg tablet; Take 1 tablet (200 mg total) by mouth once daily.  Dispense: 30 tablet; Refill: 4    Cervical spondylosis  -     CBC Auto Differential; Future; Expected date: 09/01/2023  -     CK; Future; Expected date: 09/01/2023  -     Comprehensive Metabolic Panel; Future; Expected date: 09/01/2023  -     C-Reactive Protein; Future; Expected date: 09/01/2023  -     Sedimentation rate; Future; Expected date: 09/01/2023  -     Urinalysis; Future; Expected date: 09/01/2023    Lumbar degenerative disc disease  -     CBC Auto Differential; Future; Expected date: 09/01/2023  -     CK; Future; Expected date: 09/01/2023  -     Comprehensive Metabolic Panel; Future; Expected date: 09/01/2023  -     C-Reactive Protein; Future; Expected date: 09/01/2023  -     Sedimentation rate; Future; Expected date: 09/01/2023  -     Urinalysis; Future; Expected date: 09/01/2023    Myalgia  -     CBC Auto Differential; Future; Expected date: 09/01/2023  -     CK; Future; Expected date: 09/01/2023  -     Comprehensive Metabolic Panel; Future; Expected date: 09/01/2023  -     C-Reactive Protein; Future; Expected date: 09/01/2023  -     Sedimentation rate; Future; Expected date: 09/01/2023  -     Urinalysis; Future; Expected date: 09/01/2023  -     pregabalin (LYRICA) 150 MG capsule; Take 1 capsule (150 mg total) by mouth 2 (two) times daily.  Dispense: 60 capsule; Refill: 4    Calcaneal spur of both feet  -     CBC Auto Differential; Future; Expected date:  09/01/2023  -     CK; Future; Expected date: 09/01/2023  -     Comprehensive Metabolic Panel; Future; Expected date: 09/01/2023  -     C-Reactive Protein; Future; Expected date: 09/01/2023  -     Sedimentation rate; Future; Expected date: 09/01/2023  -     Urinalysis; Future; Expected date: 09/01/2023    Medication monitoring encounter  -     CBC Auto Differential; Future; Expected date: 09/01/2023  -     CK; Future; Expected date: 09/01/2023  -     Comprehensive Metabolic Panel; Future; Expected date: 09/01/2023  -     C-Reactive Protein; Future; Expected date: 09/01/2023  -     Sedimentation rate; Future; Expected date: 09/01/2023  -     Urinalysis; Future; Expected date: 09/01/2023    Neuralgia  -     pregabalin (LYRICA) 150 MG capsule; Take 1 capsule (150 mg total) by mouth 2 (two) times daily.  Dispense: 60 capsule; Refill: 4      Follow up in about 4 months (around 9/16/2023).     David Jain MD

## 2023-05-16 ENCOUNTER — OFFICE VISIT (OUTPATIENT)
Dept: RHEUMATOLOGY | Facility: CLINIC | Age: 43
End: 2023-05-16
Payer: COMMERCIAL

## 2023-05-16 VITALS
SYSTOLIC BLOOD PRESSURE: 127 MMHG | DIASTOLIC BLOOD PRESSURE: 83 MMHG | WEIGHT: 165.19 LBS | OXYGEN SATURATION: 99 % | HEART RATE: 92 BPM | HEIGHT: 59 IN | BODY MASS INDEX: 33.3 KG/M2 | RESPIRATION RATE: 16 BRPM

## 2023-05-16 DIAGNOSIS — M77.31 CALCANEAL SPUR OF BOTH FEET: ICD-10-CM

## 2023-05-16 DIAGNOSIS — M05.79 RHEUMATOID ARTHRITIS INVOLVING MULTIPLE SITES WITH POSITIVE RHEUMATOID FACTOR: Primary | ICD-10-CM

## 2023-05-16 DIAGNOSIS — M77.32 CALCANEAL SPUR OF BOTH FEET: ICD-10-CM

## 2023-05-16 DIAGNOSIS — Z51.81 MEDICATION MONITORING ENCOUNTER: ICD-10-CM

## 2023-05-16 DIAGNOSIS — M79.10 MYALGIA: ICD-10-CM

## 2023-05-16 DIAGNOSIS — M79.2 NEURALGIA: ICD-10-CM

## 2023-05-16 DIAGNOSIS — M47.812 CERVICAL SPONDYLOSIS: ICD-10-CM

## 2023-05-16 DIAGNOSIS — M51.36 LUMBAR DEGENERATIVE DISC DISEASE: ICD-10-CM

## 2023-05-16 PROCEDURE — 3079F PR MOST RECENT DIASTOLIC BLOOD PRESSURE 80-89 MM HG: ICD-10-PCS | Mod: CPTII,S$GLB,, | Performed by: INTERNAL MEDICINE

## 2023-05-16 PROCEDURE — 1160F PR REVIEW ALL MEDS BY PRESCRIBER/CLIN PHARMACIST DOCUMENTED: ICD-10-PCS | Mod: CPTII,S$GLB,, | Performed by: INTERNAL MEDICINE

## 2023-05-16 PROCEDURE — 3074F PR MOST RECENT SYSTOLIC BLOOD PRESSURE < 130 MM HG: ICD-10-PCS | Mod: CPTII,S$GLB,, | Performed by: INTERNAL MEDICINE

## 2023-05-16 PROCEDURE — 1159F MED LIST DOCD IN RCRD: CPT | Mod: CPTII,S$GLB,, | Performed by: INTERNAL MEDICINE

## 2023-05-16 PROCEDURE — 3008F BODY MASS INDEX DOCD: CPT | Mod: CPTII,S$GLB,, | Performed by: INTERNAL MEDICINE

## 2023-05-16 PROCEDURE — 3079F DIAST BP 80-89 MM HG: CPT | Mod: CPTII,S$GLB,, | Performed by: INTERNAL MEDICINE

## 2023-05-16 PROCEDURE — 3008F PR BODY MASS INDEX (BMI) DOCUMENTED: ICD-10-PCS | Mod: CPTII,S$GLB,, | Performed by: INTERNAL MEDICINE

## 2023-05-16 PROCEDURE — 3074F SYST BP LT 130 MM HG: CPT | Mod: CPTII,S$GLB,, | Performed by: INTERNAL MEDICINE

## 2023-05-16 PROCEDURE — 99214 OFFICE O/P EST MOD 30 MIN: CPT | Mod: S$GLB,,, | Performed by: INTERNAL MEDICINE

## 2023-05-16 PROCEDURE — 1159F PR MEDICATION LIST DOCUMENTED IN MEDICAL RECORD: ICD-10-PCS | Mod: CPTII,S$GLB,, | Performed by: INTERNAL MEDICINE

## 2023-05-16 PROCEDURE — 99214 PR OFFICE/OUTPT VISIT, EST, LEVL IV, 30-39 MIN: ICD-10-PCS | Mod: S$GLB,,, | Performed by: INTERNAL MEDICINE

## 2023-05-16 PROCEDURE — 1160F RVW MEDS BY RX/DR IN RCRD: CPT | Mod: CPTII,S$GLB,, | Performed by: INTERNAL MEDICINE

## 2023-05-16 RX ORDER — PREGABALIN 150 MG/1
150 CAPSULE ORAL 2 TIMES DAILY
Qty: 60 CAPSULE | Refills: 4 | Status: SHIPPED | OUTPATIENT
Start: 2023-05-16

## 2023-05-16 RX ORDER — HYDROXYCHLOROQUINE SULFATE 200 MG/1
200 TABLET, FILM COATED ORAL DAILY
Qty: 30 TABLET | Refills: 4 | Status: SHIPPED | OUTPATIENT
Start: 2023-05-16 | End: 2024-01-03 | Stop reason: ALTCHOICE

## 2023-05-31 ENCOUNTER — OFFICE VISIT (OUTPATIENT)
Dept: FAMILY MEDICINE | Facility: CLINIC | Age: 43
End: 2023-05-31
Payer: COMMERCIAL

## 2023-05-31 VITALS
HEART RATE: 78 BPM | HEIGHT: 59 IN | BODY MASS INDEX: 33.26 KG/M2 | WEIGHT: 165 LBS | DIASTOLIC BLOOD PRESSURE: 64 MMHG | SYSTOLIC BLOOD PRESSURE: 110 MMHG | OXYGEN SATURATION: 99 %

## 2023-05-31 DIAGNOSIS — M62.838 MUSCLE SPASM: ICD-10-CM

## 2023-05-31 DIAGNOSIS — J02.9 SORE THROAT: ICD-10-CM

## 2023-05-31 DIAGNOSIS — J01.10 ACUTE NON-RECURRENT FRONTAL SINUSITIS: Primary | ICD-10-CM

## 2023-05-31 PROCEDURE — 3078F PR MOST RECENT DIASTOLIC BLOOD PRESSURE < 80 MM HG: ICD-10-PCS | Mod: CPTII,S$GLB,, | Performed by: PHYSICIAN ASSISTANT

## 2023-05-31 PROCEDURE — 1159F PR MEDICATION LIST DOCUMENTED IN MEDICAL RECORD: ICD-10-PCS | Mod: CPTII,S$GLB,, | Performed by: PHYSICIAN ASSISTANT

## 2023-05-31 PROCEDURE — 99214 OFFICE O/P EST MOD 30 MIN: CPT | Mod: S$GLB,,, | Performed by: PHYSICIAN ASSISTANT

## 2023-05-31 PROCEDURE — 3008F PR BODY MASS INDEX (BMI) DOCUMENTED: ICD-10-PCS | Mod: CPTII,S$GLB,, | Performed by: PHYSICIAN ASSISTANT

## 2023-05-31 PROCEDURE — 3008F BODY MASS INDEX DOCD: CPT | Mod: CPTII,S$GLB,, | Performed by: PHYSICIAN ASSISTANT

## 2023-05-31 PROCEDURE — 99214 PR OFFICE/OUTPT VISIT, EST, LEVL IV, 30-39 MIN: ICD-10-PCS | Mod: S$GLB,,, | Performed by: PHYSICIAN ASSISTANT

## 2023-05-31 PROCEDURE — 3074F SYST BP LT 130 MM HG: CPT | Mod: CPTII,S$GLB,, | Performed by: PHYSICIAN ASSISTANT

## 2023-05-31 PROCEDURE — 3074F PR MOST RECENT SYSTOLIC BLOOD PRESSURE < 130 MM HG: ICD-10-PCS | Mod: CPTII,S$GLB,, | Performed by: PHYSICIAN ASSISTANT

## 2023-05-31 PROCEDURE — 3078F DIAST BP <80 MM HG: CPT | Mod: CPTII,S$GLB,, | Performed by: PHYSICIAN ASSISTANT

## 2023-05-31 PROCEDURE — 1159F MED LIST DOCD IN RCRD: CPT | Mod: CPTII,S$GLB,, | Performed by: PHYSICIAN ASSISTANT

## 2023-05-31 RX ORDER — AMOXICILLIN AND CLAVULANATE POTASSIUM 875; 125 MG/1; MG/1
1 TABLET, FILM COATED ORAL EVERY 12 HOURS
Qty: 20 TABLET | Refills: 0 | Status: SHIPPED | OUTPATIENT
Start: 2023-05-31 | End: 2023-06-10

## 2023-05-31 RX ORDER — TIZANIDINE 2 MG/1
TABLET ORAL
Qty: 90 TABLET | Refills: 0 | Status: SHIPPED | OUTPATIENT
Start: 2023-05-31 | End: 2023-08-29

## 2023-05-31 NOTE — PROGRESS NOTES
Subjective:      Patient ID: Rosa Anguiano is a 42 y.o. female.    Chief Complaint: Back Pain (Patient is having pain in her back and right side of ear)      HPI  Pt is here today with a cc right ear pain , sinus drainage, and sore throat x 1 week.  She has been taking some at home clindamycin for the past week without improvement    2nd complaint- needs refills on tizandine for back spasms.  States the 4mg dosing helps but makes her sedated     Review of Systems   Constitutional:  Negative for appetite change, chills, fatigue and fever.   HENT:  Positive for nasal congestion, ear pain, postnasal drip, rhinorrhea, sinus pressure/congestion and sore throat. Negative for trouble swallowing and voice change.    Eyes:  Negative for pain and redness.   Respiratory:  Negative for cough, chest tightness, shortness of breath and wheezing.    Cardiovascular:  Negative for chest pain.   Gastrointestinal:  Negative for abdominal pain, diarrhea, nausea and vomiting.   Genitourinary:  Negative for dysuria and hematuria.   Musculoskeletal:  Negative for arthralgias, back pain, neck pain and neck stiffness.   Integumentary:  Negative for rash.   Neurological:  Negative for dizziness, weakness, numbness and headaches.   Hematological:  Negative for adenopathy.     Medication List with Changes/Refills   New Medications    AMOXICILLIN-CLAVULANATE 875-125MG (AUGMENTIN) 875-125 MG PER TABLET    Take 1 tablet by mouth every 12 (twelve) hours. for 10 days    TIZANIDINE (ZANAFLEX) 2 MG TABLET    1-2 tabs q8 prn back spasm   Current Medications    BLOOD-GLUCOSE METER,CONTINUOUS (DEXCOM G6 ) MISC    Use as instructed    BLOOD-GLUCOSE SENSOR (DEXCOM G6 SENSOR) MARIELENA    Use as instructed    BLOOD-GLUCOSE TRANSMITTER (DEXCOM G6 TRANSMITTER) MARIELENA    Use as instructed    BORIC ACID (PH-D) 600 MG VAGINAL SUPPOSITORY    Place 1 suppository (600 mg total) vaginally every evening. As directed    CLOTRIMAZOLE-BETAMETHASONE 1-0.05% (LOTRISONE)  "CREAM    Apply topically 2 (two) times daily.    ESTRADIOL (ESTRACE) 0.01 % (0.1 MG/GRAM) VAGINAL CREAM    Place 1 g vaginally every Mon, Wed, Fri.    FAMOTIDINE (PEPCID) 40 MG TABLET    Take 40 mg by mouth once daily.    FLUTICASONE PROPIONATE (FLONASE) 50 MCG/ACTUATION NASAL SPRAY    2 sprays by Each Nostril route 2 (two) times daily.    HYDROCORTISONE (ANUSOL-HC) 25 MG SUPPOSITORY    Place 25 mg rectally as needed.    HYDROXYCHLOROQUINE (PLAQUENIL) 200 MG TABLET    Take 1 tablet (200 mg total) by mouth once daily.    HYDROXYZINE PAMOATE (VISTARIL) 25 MG CAP        MELOXICAM (MOBIC) 15 MG TABLET    Take 15 mg by mouth once daily.    NYSTATIN (MYCOSTATIN) CREAM    Apply topically 2 (two) times daily.    OZEMPIC 2 MG/DOSE (8 MG/3 ML) PNIJ        PANTOPRAZOLE (PROTONIX) 40 MG TABLET        PREGABALIN (LYRICA) 150 MG CAPSULE    Take 1 capsule (150 mg total) by mouth 2 (two) times daily.    PROAIR HFA 90 MCG/ACTUATION INHALER    INL 2 PFS PO Q 4 H    PROCTOZONE-HC 2.5 % RECTAL CREAM    Place 30 g rectally as needed.        Objective:     Vitals:    05/31/23 1020   BP: 110/64   Pulse: 78   SpO2: 99%   Weight: 74.8 kg (165 lb)   Height: 4' 11" (1.499 m)        Physical Exam  Constitutional:       General: She is not in acute distress.     Appearance: She is not ill-appearing, toxic-appearing or diaphoretic.   HENT:      Head: Normocephalic.      Right Ear: Tympanic membrane, ear canal and external ear normal. No mastoid tenderness.      Left Ear: Tympanic membrane, ear canal and external ear normal. No mastoid tenderness.      Nose: Congestion present.      Mouth/Throat:      Mouth: Mucous membranes are moist.      Pharynx: Oropharynx is clear. Posterior oropharyngeal erythema present. No oropharyngeal exudate.      Comments: Thick green POST NASAL DRIP   Eyes:      General: No scleral icterus.        Right eye: No discharge.         Left eye: No discharge.      Conjunctiva/sclera: Conjunctivae normal.   Cardiovascular: "      Rate and Rhythm: Normal rate and regular rhythm.      Pulses: Normal pulses.      Heart sounds: Normal heart sounds. No murmur heard.    No friction rub. No gallop.   Pulmonary:      Effort: Pulmonary effort is normal. No respiratory distress.      Breath sounds: No wheezing, rhonchi or rales.   Musculoskeletal:         General: No swelling.      Cervical back: Normal range of motion. No rigidity or tenderness.      Right lower leg: No edema.      Left lower leg: No edema.      Comments: Moves all extremities well and with good control    Lymphadenopathy:      Cervical: No cervical adenopathy.   Skin:     General: Skin is warm and dry.   Neurological:      Mental Status: She is alert and oriented to person, place, and time.   Psychiatric:         Mood and Affect: Mood normal.         Behavior: Behavior normal.          Assessment & Plan:     Acute non-recurrent frontal sinusitis  Comments:  start OTC probiotic   Orders:  -     amoxicillin-clavulanate 875-125mg (AUGMENTIN) 875-125 mg per tablet; Take 1 tablet by mouth every 12 (twelve) hours. for 10 days  Dispense: 20 tablet; Refill: 0    Sore throat  -     POCT rapid strep A    Muscle spasm  -     tiZANidine (ZANAFLEX) 2 MG tablet; 1-2 tabs q8 prn back spasm  Dispense: 90 tablet; Refill: 0           No follow-ups on file.       -Patient instructed that our office calls back on all labs and imaging within 1 week of receiving the results. Patient instructed to reach out to our office if they have not heard from us so that we can request the results from the lab/imaging center           Tameka Dyson PA-C

## 2023-06-26 ENCOUNTER — OFFICE VISIT (OUTPATIENT)
Dept: UROLOGY | Facility: CLINIC | Age: 43
End: 2023-06-26
Payer: COMMERCIAL

## 2023-06-26 VITALS — DIASTOLIC BLOOD PRESSURE: 77 MMHG | HEART RATE: 79 BPM | SYSTOLIC BLOOD PRESSURE: 115 MMHG

## 2023-06-26 DIAGNOSIS — N30.10 INTERSTITIAL CYSTITIS: Primary | ICD-10-CM

## 2023-06-26 LAB
BILIRUBIN, UA POC OHS: ABNORMAL
BLOOD, UA POC OHS: ABNORMAL
CLARITY, UA POC OHS: CLEAR
COLOR, UA POC OHS: YELLOW
GLUCOSE, UA POC OHS: NEGATIVE
KETONES, UA POC OHS: NEGATIVE
LEUKOCYTES, UA POC OHS: NEGATIVE
NITRITE, UA POC OHS: NEGATIVE
PH, UA POC OHS: 6
POC RESIDUAL URINE VOLUME: 0 ML (ref 0–100)
PROTEIN, UA POC OHS: NEGATIVE
SPECIFIC GRAVITY, UA POC OHS: 1.02
UROBILINOGEN, UA POC OHS: 0.2

## 2023-06-26 PROCEDURE — 99213 OFFICE O/P EST LOW 20 MIN: CPT | Mod: S$GLB,,, | Performed by: NURSE PRACTITIONER

## 2023-06-26 PROCEDURE — 81003 URINALYSIS AUTO W/O SCOPE: CPT | Mod: QW,S$GLB,, | Performed by: NURSE PRACTITIONER

## 2023-06-26 PROCEDURE — 3074F PR MOST RECENT SYSTOLIC BLOOD PRESSURE < 130 MM HG: ICD-10-PCS | Mod: CPTII,S$GLB,, | Performed by: NURSE PRACTITIONER

## 2023-06-26 PROCEDURE — 3078F DIAST BP <80 MM HG: CPT | Mod: CPTII,S$GLB,, | Performed by: NURSE PRACTITIONER

## 2023-06-26 PROCEDURE — 99213 PR OFFICE/OUTPT VISIT, EST, LEVL III, 20-29 MIN: ICD-10-PCS | Mod: S$GLB,,, | Performed by: NURSE PRACTITIONER

## 2023-06-26 PROCEDURE — 3078F PR MOST RECENT DIASTOLIC BLOOD PRESSURE < 80 MM HG: ICD-10-PCS | Mod: CPTII,S$GLB,, | Performed by: NURSE PRACTITIONER

## 2023-06-26 PROCEDURE — 1159F MED LIST DOCD IN RCRD: CPT | Mod: CPTII,S$GLB,, | Performed by: NURSE PRACTITIONER

## 2023-06-26 PROCEDURE — 1160F RVW MEDS BY RX/DR IN RCRD: CPT | Mod: CPTII,S$GLB,, | Performed by: NURSE PRACTITIONER

## 2023-06-26 PROCEDURE — 51798 POCT BLADDER SCAN: ICD-10-PCS | Mod: S$GLB,,, | Performed by: NURSE PRACTITIONER

## 2023-06-26 PROCEDURE — 3074F SYST BP LT 130 MM HG: CPT | Mod: CPTII,S$GLB,, | Performed by: NURSE PRACTITIONER

## 2023-06-26 PROCEDURE — 51798 US URINE CAPACITY MEASURE: CPT | Mod: S$GLB,,, | Performed by: NURSE PRACTITIONER

## 2023-06-26 PROCEDURE — 81003 POCT URINALYSIS(INSTRUMENT): ICD-10-PCS | Mod: QW,S$GLB,, | Performed by: NURSE PRACTITIONER

## 2023-06-26 PROCEDURE — 1160F PR REVIEW ALL MEDS BY PRESCRIBER/CLIN PHARMACIST DOCUMENTED: ICD-10-PCS | Mod: CPTII,S$GLB,, | Performed by: NURSE PRACTITIONER

## 2023-06-26 PROCEDURE — 1159F PR MEDICATION LIST DOCUMENTED IN MEDICAL RECORD: ICD-10-PCS | Mod: CPTII,S$GLB,, | Performed by: NURSE PRACTITIONER

## 2023-06-26 NOTE — PROGRESS NOTES
Subjective:       Patient ID: Rosa Anguiano is a 42 y.o. female.    Chief Complaint: interstital cystitis      HPI: 42-year-old female, established patient, last seen 2023.    Patient has history of interstitial cystitis.  She follows an IC diet.  She has recently started on estrogen cream.  She uses Uribel as needed.  Patient states he is having a slight flare-up.  Complaint is some mild burning with urination.    Denies any odor urine.  Denies any fever body aches.  Denies any blood in urine.    No other urinary complaints this time.       Past Medical History:   Past Medical History:   Diagnosis Date    Acute pelvic pain, female     Breast pain     Cystitis     Diabetes mellitus     Dyspareunia, female     Endometriosis     Fibromyalgia     GERD (gastroesophageal reflux disease)     IBS (irritable bowel syndrome)     Interstitial cystitis     Irregular menstrual cycle     Leukorrhea     Osteoarthritis     Ovarian cyst     Pneumonia     YAMILET (stress urinary incontinence, female)     Vaginal yeast infection     Vulvitis        Past Surgical Historical:   Past Surgical History:   Procedure Laterality Date     SECTION      CHOLECYSTECTOMY      LAPAROSCOPY-ASSISTED VAGINAL HYSTERECTOMY      TUBAL LIGATION          Medications:   Medication List with Changes/Refills   New Medications    METHEN-M.BLUE-S.PHOS-PHSAL-HYO (URIBEL) 118-10-40.8-36 MG CAP    Take 1 capsule by mouth 3 (three) times daily as needed (bladder pain).   Current Medications    BLOOD-GLUCOSE METER,CONTINUOUS (DEXCOM G6 ) MISC    Use as instructed    BLOOD-GLUCOSE SENSOR (DEXCOM G6 SENSOR) MARIELENA    Use as instructed    BLOOD-GLUCOSE TRANSMITTER (DEXCOM G6 TRANSMITTER) MARIELENA    Use as instructed    BORIC ACID (PH-D) 600 MG VAGINAL SUPPOSITORY    Place 1 suppository (600 mg total) vaginally every evening. As directed    CLOTRIMAZOLE-BETAMETHASONE 1-0.05% (LOTRISONE) CREAM    Apply topically 2 (two) times daily.    ESTRADIOL (ESTRACE)  0.01 % (0.1 MG/GRAM) VAGINAL CREAM    Place 1 g vaginally every Mon, Wed, Fri.    FAMOTIDINE (PEPCID) 40 MG TABLET    Take 40 mg by mouth once daily.    FLUTICASONE PROPIONATE (FLONASE) 50 MCG/ACTUATION NASAL SPRAY    2 sprays by Each Nostril route 2 (two) times daily.    HYDROCORTISONE (ANUSOL-HC) 25 MG SUPPOSITORY    Place 25 mg rectally as needed.    HYDROXYCHLOROQUINE (PLAQUENIL) 200 MG TABLET    Take 1 tablet (200 mg total) by mouth once daily.    HYDROXYZINE PAMOATE (VISTARIL) 25 MG CAP        MELOXICAM (MOBIC) 15 MG TABLET    Take 15 mg by mouth once daily.    NYSTATIN (MYCOSTATIN) CREAM    Apply topically 2 (two) times daily.    OZEMPIC 2 MG/DOSE (8 MG/3 ML) PNIJ        PANTOPRAZOLE (PROTONIX) 40 MG TABLET        PREGABALIN (LYRICA) 150 MG CAPSULE    Take 1 capsule (150 mg total) by mouth 2 (two) times daily.    PROAIR HFA 90 MCG/ACTUATION INHALER    INL 2 PFS PO Q 4 H    PROCTOZONE-HC 2.5 % RECTAL CREAM    Place 30 g rectally as needed.    TIZANIDINE (ZANAFLEX) 2 MG TABLET    1-2 tabs q8 prn back spasm        Past Social History:   Social History     Socioeconomic History    Marital status:    Tobacco Use    Smoking status: Never    Smokeless tobacco: Never   Substance and Sexual Activity    Alcohol use: Not Currently    Drug use: Never    Sexual activity: Yes     Partners: Male     Birth control/protection: See Surgical Hx     Comment: Hysterectomy       Allergies:   Review of patient's allergies indicates:   Allergen Reactions    Nabumetone Other (See Comments)     Diffculty swallowing        Family History:   Family History   Problem Relation Age of Onset    Colon cancer Father 60    Prostate cancer Father 60    Breast cancer Sister 66    Ovarian cancer Neg Hx     Uterine cancer Neg Hx     Melanoma Neg Hx     Pancreatic cancer Neg Hx         Review of Systems:  Review of Systems   Constitutional:  Negative for activity change and appetite change.   HENT:  Negative for congestion and dental  problem.    Respiratory:  Negative for chest tightness and shortness of breath.    Cardiovascular:  Negative for chest pain.   Gastrointestinal:  Negative for abdominal distention.   Genitourinary:  Positive for dysuria. Negative for decreased urine volume, difficulty urinating, dyspareunia, enuresis, flank pain, frequency, genital sores, hematuria, pelvic pain and urgency.   Musculoskeletal:  Negative for back pain and neck pain.   Allergic/Immunologic: Negative for immunocompromised state.   Neurological:  Negative for dizziness.   Hematological:  Negative for adenopathy.   Psychiatric/Behavioral:  Negative for agitation, behavioral problems and confusion.      Physical Exam:  Physical Exam  Vitals and nursing note reviewed.   Constitutional:       Appearance: She is well-developed.   HENT:      Head: Normocephalic.   Cardiovascular:      Rate and Rhythm: Normal rate and regular rhythm.      Heart sounds: Normal heart sounds.   Pulmonary:      Effort: Pulmonary effort is normal.      Breath sounds: Normal breath sounds.   Abdominal:      General: Bowel sounds are normal.      Palpations: Abdomen is soft.   Skin:     General: Skin is warm and dry.   Neurological:      Mental Status: She is alert and oriented to person, place, and time.     Urinalysis: Trace intact blood, red blood cells 0 3.    Bladder scan:  0 cc    Assessment/Plan:   Interstitial cystitis:  Patient complains of some mild burning.  No signs of infection noted on urinalysis.    Patient is likely having a mild flare up.    Patient encouraged to tight upper IC diet.  Provided refill of Uribel.    She will continue her estrogen cream.  This has been helping.  She will notify us if no improvement.      Follow-up 6 months, sooner if needed  Problem List Items Addressed This Visit    None  Visit Diagnoses       Interstitial cystitis    -  Primary    Relevant Medications    methen-m.blue-s.phos-phsal-hyo (URIBEL) 118-10-40.8-36 mg Cap    Other Relevant  Orders    POCT Urinalysis(Instrument)    POCT Bladder Scan

## 2023-07-24 ENCOUNTER — TELEPHONE (OUTPATIENT)
Dept: FAMILY MEDICINE | Facility: CLINIC | Age: 43
End: 2023-07-24
Payer: COMMERCIAL

## 2023-07-24 RX ORDER — FLUCONAZOLE 150 MG/1
150 TABLET ORAL DAILY
Qty: 1 TABLET | Refills: 0 | Status: SHIPPED | OUTPATIENT
Start: 2023-07-24 | End: 2023-07-25

## 2023-07-26 ENCOUNTER — OFFICE VISIT (OUTPATIENT)
Dept: FAMILY MEDICINE | Facility: CLINIC | Age: 43
End: 2023-07-26
Payer: COMMERCIAL

## 2023-07-26 VITALS
HEIGHT: 59 IN | BODY MASS INDEX: 33.23 KG/M2 | SYSTOLIC BLOOD PRESSURE: 114 MMHG | WEIGHT: 164.81 LBS | OXYGEN SATURATION: 99 % | HEART RATE: 88 BPM | DIASTOLIC BLOOD PRESSURE: 65 MMHG

## 2023-07-26 DIAGNOSIS — J01.00 ACUTE NON-RECURRENT MAXILLARY SINUSITIS: ICD-10-CM

## 2023-07-26 DIAGNOSIS — N30.01 ACUTE CYSTITIS WITH HEMATURIA: ICD-10-CM

## 2023-07-26 DIAGNOSIS — R30.0 DYSURIA: Primary | ICD-10-CM

## 2023-07-26 PROCEDURE — 99214 OFFICE O/P EST MOD 30 MIN: CPT | Mod: 25,S$GLB,, | Performed by: PHYSICIAN ASSISTANT

## 2023-07-26 PROCEDURE — 3074F PR MOST RECENT SYSTOLIC BLOOD PRESSURE < 130 MM HG: ICD-10-PCS | Mod: CPTII,S$GLB,, | Performed by: PHYSICIAN ASSISTANT

## 2023-07-26 PROCEDURE — 1159F PR MEDICATION LIST DOCUMENTED IN MEDICAL RECORD: ICD-10-PCS | Mod: CPTII,S$GLB,, | Performed by: PHYSICIAN ASSISTANT

## 2023-07-26 PROCEDURE — 1159F MED LIST DOCD IN RCRD: CPT | Mod: CPTII,S$GLB,, | Performed by: PHYSICIAN ASSISTANT

## 2023-07-26 PROCEDURE — 96372 THER/PROPH/DIAG INJ SC/IM: CPT | Mod: S$GLB,,, | Performed by: PHYSICIAN ASSISTANT

## 2023-07-26 PROCEDURE — 96372 PR INJECTION,THERAP/PROPH/DIAG2ST, IM OR SUBCUT: ICD-10-PCS | Mod: S$GLB,,, | Performed by: PHYSICIAN ASSISTANT

## 2023-07-26 PROCEDURE — 3078F DIAST BP <80 MM HG: CPT | Mod: CPTII,S$GLB,, | Performed by: PHYSICIAN ASSISTANT

## 2023-07-26 PROCEDURE — 3008F PR BODY MASS INDEX (BMI) DOCUMENTED: ICD-10-PCS | Mod: CPTII,S$GLB,, | Performed by: PHYSICIAN ASSISTANT

## 2023-07-26 PROCEDURE — 99214 PR OFFICE/OUTPT VISIT, EST, LEVL IV, 30-39 MIN: ICD-10-PCS | Mod: 25,S$GLB,, | Performed by: PHYSICIAN ASSISTANT

## 2023-07-26 PROCEDURE — 3078F PR MOST RECENT DIASTOLIC BLOOD PRESSURE < 80 MM HG: ICD-10-PCS | Mod: CPTII,S$GLB,, | Performed by: PHYSICIAN ASSISTANT

## 2023-07-26 PROCEDURE — 81003 POCT URINALYSIS(INSTRUMENT): ICD-10-PCS | Mod: QW,S$GLB,, | Performed by: PHYSICIAN ASSISTANT

## 2023-07-26 PROCEDURE — 81003 URINALYSIS AUTO W/O SCOPE: CPT | Mod: QW,S$GLB,, | Performed by: PHYSICIAN ASSISTANT

## 2023-07-26 PROCEDURE — 3074F SYST BP LT 130 MM HG: CPT | Mod: CPTII,S$GLB,, | Performed by: PHYSICIAN ASSISTANT

## 2023-07-26 PROCEDURE — 3008F BODY MASS INDEX DOCD: CPT | Mod: CPTII,S$GLB,, | Performed by: PHYSICIAN ASSISTANT

## 2023-07-26 RX ORDER — CEFTRIAXONE 1 G/1
1 INJECTION, POWDER, FOR SOLUTION INTRAMUSCULAR; INTRAVENOUS
Status: COMPLETED | OUTPATIENT
Start: 2023-07-26 | End: 2023-07-26

## 2023-07-26 RX ORDER — AMOXICILLIN AND CLAVULANATE POTASSIUM 875; 125 MG/1; MG/1
1 TABLET, FILM COATED ORAL EVERY 12 HOURS
Qty: 14 TABLET | Refills: 0 | Status: SHIPPED | OUTPATIENT
Start: 2023-07-26 | End: 2023-08-02

## 2023-07-26 RX ADMIN — CEFTRIAXONE 1 G: 1 INJECTION, POWDER, FOR SOLUTION INTRAMUSCULAR; INTRAVENOUS at 11:07

## 2023-07-26 NOTE — PROGRESS NOTES
Subjective:      Patient ID: Rosa Anguiano is a 42 y.o. female.    Chief Complaint: Urinary Tract Infection (Patient Is here for a uti and yeast infection )      HPI  Pt is here today with a cc of Dysuria and SP pressure x 5 days.  Reports she typically gets a UTI after sex, which correlates to the start of these s/s.     Taking macrobid BID x 5 days     2nd complaints of sore throat and ear pressure x 2 days   Review of Systems   Constitutional:  Negative for activity change, appetite change, chills, diaphoresis, fatigue and fever.   HENT:  Positive for ear pain, postnasal drip, rhinorrhea and sore throat. Negative for nasal congestion, trouble swallowing and voice change.    Eyes:  Negative for pain and redness.   Respiratory:  Negative for cough, choking, chest tightness, shortness of breath and wheezing.    Cardiovascular:  Negative for chest pain and leg swelling.   Gastrointestinal:  Negative for abdominal distention, abdominal pain, blood in stool, constipation, diarrhea, nausea, vomiting and reflux.   Genitourinary:  Positive for dysuria. Negative for bladder incontinence, decreased urine volume, difficulty urinating, enuresis, flank pain, frequency, hematuria, nocturia, pelvic pain, urgency, vaginal bleeding, vaginal discharge, vaginal pain and vaginal dryness.   Musculoskeletal:  Negative for arthralgias, back pain, leg pain, myalgias, neck pain and neck stiffness.   Integumentary:  Negative for rash.   Neurological:  Negative for dizziness, weakness, numbness and headaches.   Hematological:  Negative for adenopathy.     Medication List with Changes/Refills   New Medications    AMOXICILLIN-CLAVULANATE 875-125MG (AUGMENTIN) 875-125 MG PER TABLET    Take 1 tablet by mouth every 12 (twelve) hours. for 7 days   Current Medications    BLOOD-GLUCOSE METER,CONTINUOUS (DEXCOM G6 ) MISC    Use as instructed    BLOOD-GLUCOSE SENSOR (DEXCOM G6 SENSOR) MARIELENA    Use as instructed    BLOOD-GLUCOSE TRANSMITTER  "(DEXCOM G6 TRANSMITTER) MARIELENA    Use as instructed    BORIC ACID (PH-D) 600 MG VAGINAL SUPPOSITORY    Place 1 suppository (600 mg total) vaginally every evening. As directed    CLOTRIMAZOLE-BETAMETHASONE 1-0.05% (LOTRISONE) CREAM    Apply topically 2 (two) times daily.    ESTRADIOL (ESTRACE) 0.01 % (0.1 MG/GRAM) VAGINAL CREAM    Place 1 g vaginally every Mon, Wed, Fri.    FAMOTIDINE (PEPCID) 40 MG TABLET    Take 40 mg by mouth once daily.    FLUTICASONE PROPIONATE (FLONASE) 50 MCG/ACTUATION NASAL SPRAY    2 sprays by Each Nostril route 2 (two) times daily.    HYDROCORTISONE (ANUSOL-HC) 25 MG SUPPOSITORY    Place 25 mg rectally as needed.    HYDROXYCHLOROQUINE (PLAQUENIL) 200 MG TABLET    Take 1 tablet (200 mg total) by mouth once daily.    HYDROXYZINE PAMOATE (VISTARIL) 25 MG CAP        MELOXICAM (MOBIC) 15 MG TABLET    Take 15 mg by mouth once daily.    METHEN-M.BLUE-S.PHOS-PHSAL-HYO (URIBEL) 118-10-40.8-36 MG CAP    Take 1 capsule by mouth 3 (three) times daily as needed (bladder pain).    NYSTATIN (MYCOSTATIN) CREAM    Apply topically 2 (two) times daily.    OZEMPIC 2 MG/DOSE (8 MG/3 ML) PNIJ        PANTOPRAZOLE (PROTONIX) 40 MG TABLET        PREGABALIN (LYRICA) 150 MG CAPSULE    Take 1 capsule (150 mg total) by mouth 2 (two) times daily.    PROAIR HFA 90 MCG/ACTUATION INHALER    INL 2 PFS PO Q 4 H    PROCTOZONE-HC 2.5 % RECTAL CREAM    Place 30 g rectally as needed.    TIZANIDINE (ZANAFLEX) 2 MG TABLET    1-2 tabs q8 prn back spasm        Objective:     Vitals:    07/26/23 1014   BP: 114/65   Pulse: 88   SpO2: 99%   Weight: 74.8 kg (164 lb 12.8 oz)   Height: 4' 11" (1.499 m)        Physical Exam  Constitutional:       General: She is not in acute distress.     Appearance: Normal appearance. She is normal weight. She is not ill-appearing, toxic-appearing or diaphoretic.   HENT:      Head: Normocephalic and atraumatic.      Right Ear: External ear normal.      Left Ear: External ear normal.      Nose: Congestion " present.      Mouth/Throat:      Comments: Purulent post nasal drip noted   Eyes:      Conjunctiva/sclera: Conjunctivae normal.      Pupils: Pupils are equal, round, and reactive to light.   Cardiovascular:      Rate and Rhythm: Normal rate and regular rhythm.      Pulses: Normal pulses.      Heart sounds: Normal heart sounds. No murmur heard.    No friction rub. No gallop.   Pulmonary:      Effort: Pulmonary effort is normal.      Breath sounds: Normal breath sounds.   Abdominal:      General: Abdomen is flat. Bowel sounds are normal. There is no distension.      Palpations: Abdomen is soft. There is no mass.      Tenderness: There is no abdominal tenderness. There is no right CVA tenderness, left CVA tenderness, guarding or rebound.      Hernia: No hernia is present.      Comments:   SP pressure on exam    Musculoskeletal:      Cervical back: Normal range of motion and neck supple.   Skin:     General: Skin is warm and dry.      Capillary Refill: Capillary refill takes less than 2 seconds.   Neurological:      Mental Status: She is alert and oriented to person, place, and time.   Psychiatric:         Mood and Affect: Mood normal.         Behavior: Behavior normal.          Assessment & Plan:     Dysuria  -     POCT Urinalysis(Instrument)  -     Urine culture    Acute cystitis with hematuria  -     Urine culture  -     cefTRIAXone injection 1 g  -     amoxicillin-clavulanate 875-125mg (AUGMENTIN) 875-125 mg per tablet; Take 1 tablet by mouth every 12 (twelve) hours. for 7 days  Dispense: 14 tablet; Refill: 0    Acute non-recurrent maxillary sinusitis  -     amoxicillin-clavulanate 875-125mg (AUGMENTIN) 875-125 mg per tablet; Take 1 tablet by mouth every 12 (twelve) hours. for 7 days  Dispense: 14 tablet; Refill: 0           Return to clinic if not improving or worsening at anytime     Urinary Tract Infections: Any fever, chills, vomiting, abdominal pain, or worsening symptoms and you should return to clinic or go  to the ER for more evaluation          -Patient instructed that our office calls back on all labs and imaging within 1 week of receiving the results. Patient instructed to reach out to our office if they have not heard from us so that we can request the results from the lab/imaging center           Tameka Dyson PA-C

## 2023-07-28 LAB
BILIRUBIN, UA POC OHS: ABNORMAL
BLOOD, UA POC OHS: NEGATIVE
CLARITY, UA POC OHS: CLEAR
COLOR, UA POC OHS: ABNORMAL
GLUCOSE, UA POC OHS: NEGATIVE
KETONES, UA POC OHS: NEGATIVE
LEUKOCYTES, UA POC OHS: NEGATIVE
NITRITE, UA POC OHS: NEGATIVE
PH, UA POC OHS: 5.5
PROTEIN, UA POC OHS: NEGATIVE
SPECIFIC GRAVITY, UA POC OHS: 1.02
UROBILINOGEN, UA POC OHS: 0.2

## 2023-07-29 LAB — URINE CULTURE, ROUTINE: NORMAL

## 2023-08-07 DIAGNOSIS — R82.90 ABNORMAL URINALYSIS: Primary | ICD-10-CM

## 2023-08-09 ENCOUNTER — OFFICE VISIT (OUTPATIENT)
Dept: UROLOGY | Facility: CLINIC | Age: 43
End: 2023-08-09
Payer: COMMERCIAL

## 2023-08-09 VITALS — DIASTOLIC BLOOD PRESSURE: 77 MMHG | SYSTOLIC BLOOD PRESSURE: 123 MMHG | HEART RATE: 97 BPM

## 2023-08-09 DIAGNOSIS — N32.89 BLADDER SPASMS: ICD-10-CM

## 2023-08-09 DIAGNOSIS — N30.10 INTERSTITIAL CYSTITIS: Primary | ICD-10-CM

## 2023-08-09 LAB
BILIRUBIN, UA POC OHS: NEGATIVE
BLOOD, UA POC OHS: ABNORMAL
CLARITY, UA POC OHS: CLEAR
COLOR, UA POC OHS: ABNORMAL
GLUCOSE, UA POC OHS: NEGATIVE
KETONES, UA POC OHS: NEGATIVE
LEUKOCYTES, UA POC OHS: NEGATIVE
NITRITE, UA POC OHS: NEGATIVE
PH, UA POC OHS: 5.5
POC RESIDUAL URINE VOLUME: 10 ML (ref 0–100)
PROTEIN, UA POC OHS: NEGATIVE
SPECIFIC GRAVITY, UA POC OHS: 1.02
UROBILINOGEN, UA POC OHS: 0.2

## 2023-08-09 PROCEDURE — 1160F RVW MEDS BY RX/DR IN RCRD: CPT | Mod: CPTII,S$GLB,, | Performed by: NURSE PRACTITIONER

## 2023-08-09 PROCEDURE — 81003 URINALYSIS AUTO W/O SCOPE: CPT | Mod: QW,S$GLB,, | Performed by: NURSE PRACTITIONER

## 2023-08-09 PROCEDURE — 1160F PR REVIEW ALL MEDS BY PRESCRIBER/CLIN PHARMACIST DOCUMENTED: ICD-10-PCS | Mod: CPTII,S$GLB,, | Performed by: NURSE PRACTITIONER

## 2023-08-09 PROCEDURE — 1159F PR MEDICATION LIST DOCUMENTED IN MEDICAL RECORD: ICD-10-PCS | Mod: CPTII,S$GLB,, | Performed by: NURSE PRACTITIONER

## 2023-08-09 PROCEDURE — 99214 OFFICE O/P EST MOD 30 MIN: CPT | Mod: S$GLB,,, | Performed by: NURSE PRACTITIONER

## 2023-08-09 PROCEDURE — 1159F MED LIST DOCD IN RCRD: CPT | Mod: CPTII,S$GLB,, | Performed by: NURSE PRACTITIONER

## 2023-08-09 PROCEDURE — 3074F SYST BP LT 130 MM HG: CPT | Mod: CPTII,S$GLB,, | Performed by: NURSE PRACTITIONER

## 2023-08-09 PROCEDURE — 3078F DIAST BP <80 MM HG: CPT | Mod: CPTII,S$GLB,, | Performed by: NURSE PRACTITIONER

## 2023-08-09 PROCEDURE — 99214 PR OFFICE/OUTPT VISIT, EST, LEVL IV, 30-39 MIN: ICD-10-PCS | Mod: S$GLB,,, | Performed by: NURSE PRACTITIONER

## 2023-08-09 PROCEDURE — 51798 US URINE CAPACITY MEASURE: CPT | Mod: S$GLB,,, | Performed by: NURSE PRACTITIONER

## 2023-08-09 PROCEDURE — 3074F PR MOST RECENT SYSTOLIC BLOOD PRESSURE < 130 MM HG: ICD-10-PCS | Mod: CPTII,S$GLB,, | Performed by: NURSE PRACTITIONER

## 2023-08-09 PROCEDURE — 3078F PR MOST RECENT DIASTOLIC BLOOD PRESSURE < 80 MM HG: ICD-10-PCS | Mod: CPTII,S$GLB,, | Performed by: NURSE PRACTITIONER

## 2023-08-09 PROCEDURE — 81003 POCT URINALYSIS(INSTRUMENT): ICD-10-PCS | Mod: QW,S$GLB,, | Performed by: NURSE PRACTITIONER

## 2023-08-09 PROCEDURE — 51798 POCT BLADDER SCAN: ICD-10-PCS | Mod: S$GLB,,, | Performed by: NURSE PRACTITIONER

## 2023-08-09 NOTE — PROGRESS NOTES
Subjective:       Patient ID: Rosa Anguiano is a 42 y.o. female.    Chief Complaint: Urinary Urgency and Bladder Pain      HPI: 42-year-old female, established patient, presents for bladder spasms.    Patient has history of interstitial cystitis.  She is maintained on IC diet and oxybutynin XL 15 mg daily.    She is also on estrogen cream for vaginal pain.  She is complaining of bladder spasms.  States it has been going on for a couple weeks.  Also having some slow stream with dribbling.    She is been using Uribel and AZO with some relief but symptoms persist.  States he does well for the most part, however she will have occasional flare-ups, typically 1 a month.  She admits that her flaps usually are preceded by spicy foods such as chills.    Denies any fever or body aches.  Denies any odor urine.  Denies any blood in urine.      No other urinary complaints at this time.         Past Medical History:   Past Medical History:   Diagnosis Date    Acute pelvic pain, female     Breast pain     Cystitis     Diabetes mellitus     Dyspareunia, female     Endometriosis     Fibromyalgia     GERD (gastroesophageal reflux disease)     IBS (irritable bowel syndrome)     Interstitial cystitis     Irregular menstrual cycle     Leukorrhea     Osteoarthritis     Ovarian cyst     Pneumonia     YAMILET (stress urinary incontinence, female)     Vaginal yeast infection     Vulvitis        Past Surgical Historical:   Past Surgical History:   Procedure Laterality Date     SECTION      CHOLECYSTECTOMY      LAPAROSCOPY-ASSISTED VAGINAL HYSTERECTOMY      TUBAL LIGATION          Medications:   Medication List with Changes/Refills   Current Medications    BLOOD-GLUCOSE METER,CONTINUOUS (DEXCOM G6 ) MISC    Use as instructed    BLOOD-GLUCOSE SENSOR (DEXCOM G6 SENSOR) MARIELENA    Use as instructed    BLOOD-GLUCOSE TRANSMITTER (DEXCOM G6 TRANSMITTER) MARIELENA    Use as instructed    BORIC ACID (PH-D) 600 MG VAGINAL SUPPOSITORY    Place 1  suppository (600 mg total) vaginally every evening. As directed    CLOTRIMAZOLE-BETAMETHASONE 1-0.05% (LOTRISONE) CREAM    Apply topically 2 (two) times daily.    ESTRADIOL (ESTRACE) 0.01 % (0.1 MG/GRAM) VAGINAL CREAM    Place 1 g vaginally every Mon, Wed, Fri.    FAMOTIDINE (PEPCID) 40 MG TABLET    Take 40 mg by mouth once daily.    FLUTICASONE PROPIONATE (FLONASE) 50 MCG/ACTUATION NASAL SPRAY    2 sprays by Each Nostril route 2 (two) times daily.    HYDROCORTISONE (ANUSOL-HC) 25 MG SUPPOSITORY    Place 25 mg rectally as needed.    HYDROXYCHLOROQUINE (PLAQUENIL) 200 MG TABLET    Take 1 tablet (200 mg total) by mouth once daily.    HYDROXYZINE PAMOATE (VISTARIL) 25 MG CAP        MELOXICAM (MOBIC) 15 MG TABLET    Take 15 mg by mouth once daily.    NYSTATIN (MYCOSTATIN) CREAM    Apply topically 2 (two) times daily.    OZEMPIC 2 MG/DOSE (8 MG/3 ML) PNIJ        PANTOPRAZOLE (PROTONIX) 40 MG TABLET        PREGABALIN (LYRICA) 150 MG CAPSULE    Take 1 capsule (150 mg total) by mouth 2 (two) times daily.    PROAIR HFA 90 MCG/ACTUATION INHALER    INL 2 PFS PO Q 4 H    PROCTOZONE-HC 2.5 % RECTAL CREAM    Place 30 g rectally as needed.    TIZANIDINE (ZANAFLEX) 2 MG TABLET    1-2 tabs q8 prn back spasm   Changed and/or Refilled Medications    Modified Medication Previous Medication    METHEN-M.BLUE-S.PHOS-PHSAL-HYO (URIBEL) 118-10-40.8-36 MG CAP methen-m.blue-s.phos-phsal-hyo (URIBEL) 118-10-40.8-36 mg Cap       Take 1 capsule by mouth 3 (three) times daily as needed (bladder pain).    Take 1 capsule by mouth 3 (three) times daily as needed (bladder pain).        Past Social History:   Social History     Socioeconomic History    Marital status:    Tobacco Use    Smoking status: Never    Smokeless tobacco: Never   Substance and Sexual Activity    Alcohol use: Not Currently    Drug use: Never    Sexual activity: Yes     Partners: Male     Birth control/protection: See Surgical Hx     Comment: Hysterectomy       Allergies:    Review of patient's allergies indicates:   Allergen Reactions    Nabumetone Other (See Comments)     Diffculty swallowing        Family History:   Family History   Problem Relation Age of Onset    Colon cancer Father 60    Prostate cancer Father 60    Breast cancer Sister 66    Ovarian cancer Neg Hx     Uterine cancer Neg Hx     Melanoma Neg Hx     Pancreatic cancer Neg Hx         Review of Systems:  Review of Systems   Constitutional:  Negative for activity change and appetite change.   HENT:  Negative for congestion and dental problem.    Respiratory:  Negative for chest tightness and shortness of breath.    Cardiovascular:  Negative for chest pain.   Gastrointestinal:  Negative for abdominal distention and abdominal pain.   Genitourinary:  Negative for decreased urine volume, difficulty urinating, dyspareunia, dysuria, enuresis, flank pain, frequency, genital sores, hematuria, pelvic pain and urgency.   Musculoskeletal:  Negative for back pain and neck pain.   Allergic/Immunologic: Negative for immunocompromised state.   Neurological:  Negative for dizziness.   Hematological:  Negative for adenopathy.   Psychiatric/Behavioral:  Negative for agitation, behavioral problems and confusion.        Physical Exam:  Physical Exam  Vitals and nursing note reviewed.   Constitutional:       Appearance: She is well-developed.   HENT:      Head: Normocephalic.   Eyes:      Pupils: Pupils are equal, round, and reactive to light.   Cardiovascular:      Rate and Rhythm: Normal rate and regular rhythm.      Heart sounds: Normal heart sounds.   Pulmonary:      Effort: Pulmonary effort is normal.      Breath sounds: Normal breath sounds.   Abdominal:      General: Bowel sounds are normal.      Palpations: Abdomen is soft.   Musculoskeletal:         General: Normal range of motion.      Cervical back: Normal range of motion and neck supple.   Skin:     General: Skin is warm and dry.   Neurological:      Mental Status: She is alert  and oriented to person, place, and time.   Psychiatric:         Mood and Affect: Mood normal.         Behavior: Behavior normal.       Urinalysis: Trace lysed blood, red blood cells 0-2.    Bladder scan:  10 cc    Assessment/Plan:   Interstitial cystitis/bladder spasms:  Patient has used IC 2 caps in the past with no relief.    Patient encouraged to tighten up her diet and increase fluids.    Patient provided refill of Uribel.    We discussed Prelief to use before triggers.  We also discussed aloe vera.    Patient will notify us if there is no improvement.      Patient has appointment scheduled in January, will keep that appointment as scheduled, sooner if needed.  Problem List Items Addressed This Visit    None  Visit Diagnoses       Interstitial cystitis    -  Primary    Relevant Medications    methen-m.blue-s.phos-phsal-hyo (URIBEL) 118-10-40.8-36 mg Cap    Other Relevant Orders    POCT Urinalysis(Instrument)    POCT Bladder Scan    Bladder spasms        Relevant Medications    methen-m.blue-s.phos-phsal-hyo (URIBEL) 118-10-40.8-36 mg Cap    Other Relevant Orders    POCT Urinalysis(Instrument)    POCT Bladder Scan

## 2023-08-29 ENCOUNTER — OFFICE VISIT (OUTPATIENT)
Dept: OBSTETRICS AND GYNECOLOGY | Facility: CLINIC | Age: 43
End: 2023-08-29
Payer: COMMERCIAL

## 2023-08-29 VITALS
WEIGHT: 165 LBS | HEIGHT: 59 IN | SYSTOLIC BLOOD PRESSURE: 139 MMHG | DIASTOLIC BLOOD PRESSURE: 85 MMHG | HEART RATE: 96 BPM | BODY MASS INDEX: 33.26 KG/M2

## 2023-08-29 DIAGNOSIS — N76.0 VAGINITIS AND VULVOVAGINITIS: Primary | ICD-10-CM

## 2023-08-29 PROCEDURE — 99214 PR OFFICE/OUTPT VISIT, EST, LEVL IV, 30-39 MIN: ICD-10-PCS | Mod: S$GLB,,, | Performed by: OBSTETRICS & GYNECOLOGY

## 2023-08-29 PROCEDURE — 3079F PR MOST RECENT DIASTOLIC BLOOD PRESSURE 80-89 MM HG: ICD-10-PCS | Mod: CPTII,S$GLB,, | Performed by: OBSTETRICS & GYNECOLOGY

## 2023-08-29 PROCEDURE — 3079F DIAST BP 80-89 MM HG: CPT | Mod: CPTII,S$GLB,, | Performed by: OBSTETRICS & GYNECOLOGY

## 2023-08-29 PROCEDURE — 3008F PR BODY MASS INDEX (BMI) DOCUMENTED: ICD-10-PCS | Mod: CPTII,S$GLB,, | Performed by: OBSTETRICS & GYNECOLOGY

## 2023-08-29 PROCEDURE — 1159F MED LIST DOCD IN RCRD: CPT | Mod: CPTII,S$GLB,, | Performed by: OBSTETRICS & GYNECOLOGY

## 2023-08-29 PROCEDURE — 3075F SYST BP GE 130 - 139MM HG: CPT | Mod: CPTII,S$GLB,, | Performed by: OBSTETRICS & GYNECOLOGY

## 2023-08-29 PROCEDURE — 3008F BODY MASS INDEX DOCD: CPT | Mod: CPTII,S$GLB,, | Performed by: OBSTETRICS & GYNECOLOGY

## 2023-08-29 PROCEDURE — 3075F PR MOST RECENT SYSTOLIC BLOOD PRESS GE 130-139MM HG: ICD-10-PCS | Mod: CPTII,S$GLB,, | Performed by: OBSTETRICS & GYNECOLOGY

## 2023-08-29 PROCEDURE — 1159F PR MEDICATION LIST DOCUMENTED IN MEDICAL RECORD: ICD-10-PCS | Mod: CPTII,S$GLB,, | Performed by: OBSTETRICS & GYNECOLOGY

## 2023-08-29 PROCEDURE — 99214 OFFICE O/P EST MOD 30 MIN: CPT | Mod: S$GLB,,, | Performed by: OBSTETRICS & GYNECOLOGY

## 2023-08-29 RX ORDER — HYDROXYZINE HYDROCHLORIDE 25 MG/1
TABLET, FILM COATED ORAL
COMMUNITY
Start: 2023-08-07

## 2023-08-29 RX ORDER — CLOTRIMAZOLE AND BETAMETHASONE DIPROPIONATE 10; .64 MG/G; MG/G
CREAM TOPICAL 2 TIMES DAILY
Qty: 45 G | Refills: 0 | Status: SHIPPED | OUTPATIENT
Start: 2023-08-29 | End: 2023-09-12

## 2023-08-29 RX ORDER — METHOCARBAMOL 500 MG/1
500 TABLET, FILM COATED ORAL 4 TIMES DAILY
COMMUNITY
End: 2023-10-30 | Stop reason: SDUPTHER

## 2023-08-29 RX ORDER — FLUCONAZOLE 150 MG/1
150 TABLET ORAL EVERY OTHER DAY
Qty: 2 TABLET | Refills: 0 | Status: SHIPPED | OUTPATIENT
Start: 2023-08-29 | End: 2023-09-01

## 2023-08-29 RX ORDER — FLUCONAZOLE 150 MG/1
TABLET ORAL
COMMUNITY
Start: 2023-08-16 | End: 2023-08-29

## 2023-08-29 NOTE — PROGRESS NOTES
Subjective:      Patient ID: Rosa Anguiano is a 42 y.o. female.    Chief Complaint:  Vaginitis (Feels bruised, burning inside vagina, and itching. Noticed redness and irritation)      History of Present Illness  HPI  Patient presents today with complaints of vaginal burning and itching    GYN & OB History  No LMP recorded. Patient has had a hysterectomy.   Date of Last Pap: No result found    OB History    Para Term  AB Living   7 4     3     SAB IAB Ectopic Multiple Live Births   2   1          # Outcome Date GA Lbr Ward/2nd Weight Sex Delivery Anes PTL Lv   7 Ectopic            6 SAB            5 SAB            4 Para      CS-Unspec      3 Para      CS-Unspec      2 Para      CS-Unspec      1 Para      CS-Unspec          Review of Systems  Review of Systems   Gastrointestinal:  Negative for abdominal pain, bloating, blood in stool, constipation, diarrhea, nausea, vomiting, reflux and fecal incontinence.   Genitourinary:  Positive for vaginal discharge and vaginal pain. Negative for bladder incontinence, decreased libido, dysmenorrhea, dyspareunia, dysuria, flank pain, frequency, genital sores, hematuria, hot flashes, menorrhagia, menstrual problem, pelvic pain, urgency, vaginal bleeding, urinary incontinence, postcoital bleeding, postmenopausal bleeding, vaginal dryness and vaginal odor.        Burning and itching          Objective:     Physical Exam:   Constitutional: Vital signs are normal. She appears well-developed and well-nourished.               Genitourinary:    Vagina and uterus normal.      Pelvic exam was performed with patient supine.   Labial bartholins normal.Cervix is normal. Right adnexum displays no mass, no tenderness and no fullness. Left adnexum displays no mass, no tenderness and no fullness. No erythema,  no vaginal discharge, tenderness, bleeding, rectocele, cystocele or unspecified prolapse of vaginal walls in the vagina.    No foreign body in the vagina.      No signs of  injury in the vagina.   Cervix exhibits no motion tenderness, no discharge and no friability.                  Chaperone present        Assessment:     1. Vaginitis and vulvovaginitis              Plan:     1. Vaginitis and vulvovaginitis  We will speak with her endocrinologist regarding her glucose control the patient is having trouble getting her medications filled  - fluconazole (DIFLUCAN) 150 MG Tab; Take 1 tablet (150 mg total) by mouth every other day. for 2 doses  Dispense: 2 tablet; Refill: 0  - Vaginosis Screen by DNA Probe; Future  - clotrimazole-betamethasone 1-0.05% (LOTRISONE) cream; Apply topically 2 (two) times daily. for 14 days  Dispense: 45 g; Refill: 0  - Vaginosis Screen by DNA Probe

## 2023-08-30 ENCOUNTER — OFFICE VISIT (OUTPATIENT)
Dept: FAMILY MEDICINE | Facility: CLINIC | Age: 43
End: 2023-08-30
Payer: COMMERCIAL

## 2023-08-30 VITALS
WEIGHT: 160.88 LBS | HEIGHT: 59 IN | BODY MASS INDEX: 32.43 KG/M2 | DIASTOLIC BLOOD PRESSURE: 73 MMHG | SYSTOLIC BLOOD PRESSURE: 125 MMHG | OXYGEN SATURATION: 98 % | HEART RATE: 110 BPM

## 2023-08-30 DIAGNOSIS — E11.9 TYPE 2 DIABETES MELLITUS WITHOUT COMPLICATION, WITHOUT LONG-TERM CURRENT USE OF INSULIN: Primary | ICD-10-CM

## 2023-08-30 PROCEDURE — 1159F MED LIST DOCD IN RCRD: CPT | Mod: CPTII,S$GLB,, | Performed by: PHYSICIAN ASSISTANT

## 2023-08-30 PROCEDURE — 1159F PR MEDICATION LIST DOCUMENTED IN MEDICAL RECORD: ICD-10-PCS | Mod: CPTII,S$GLB,, | Performed by: PHYSICIAN ASSISTANT

## 2023-08-30 PROCEDURE — 3008F PR BODY MASS INDEX (BMI) DOCUMENTED: ICD-10-PCS | Mod: CPTII,S$GLB,, | Performed by: PHYSICIAN ASSISTANT

## 2023-08-30 PROCEDURE — 3074F SYST BP LT 130 MM HG: CPT | Mod: CPTII,S$GLB,, | Performed by: PHYSICIAN ASSISTANT

## 2023-08-30 PROCEDURE — 99214 OFFICE O/P EST MOD 30 MIN: CPT | Mod: S$GLB,,, | Performed by: PHYSICIAN ASSISTANT

## 2023-08-30 PROCEDURE — 3008F BODY MASS INDEX DOCD: CPT | Mod: CPTII,S$GLB,, | Performed by: PHYSICIAN ASSISTANT

## 2023-08-30 PROCEDURE — 99214 PR OFFICE/OUTPT VISIT, EST, LEVL IV, 30-39 MIN: ICD-10-PCS | Mod: S$GLB,,, | Performed by: PHYSICIAN ASSISTANT

## 2023-08-30 PROCEDURE — 3074F PR MOST RECENT SYSTOLIC BLOOD PRESSURE < 130 MM HG: ICD-10-PCS | Mod: CPTII,S$GLB,, | Performed by: PHYSICIAN ASSISTANT

## 2023-08-30 PROCEDURE — 3078F PR MOST RECENT DIASTOLIC BLOOD PRESSURE < 80 MM HG: ICD-10-PCS | Mod: CPTII,S$GLB,, | Performed by: PHYSICIAN ASSISTANT

## 2023-08-30 PROCEDURE — 3078F DIAST BP <80 MM HG: CPT | Mod: CPTII,S$GLB,, | Performed by: PHYSICIAN ASSISTANT

## 2023-08-30 RX ORDER — TIRZEPATIDE 5 MG/.5ML
5 INJECTION, SOLUTION SUBCUTANEOUS
Qty: 4 PEN | Refills: 0 | Status: SHIPPED | OUTPATIENT
Start: 2023-08-30 | End: 2023-11-06

## 2023-08-30 NOTE — PROGRESS NOTES
Subjective:      Patient ID: Rosa Anguiano is a 42 y.o. female.    Chief Complaint: Follow-up (Patient is here for a follow up visit and wants to talk about her medication)      HPI  Pt is here today for DM followup     Having difficulty finding ozempic in stock. States she is having some elevated glucose readings over 200.  Usually around 10pm.      Sees Dr. Mancia for endo , next appt is in October     Reports she is having some vaginal itching and irritation.  Seen by GYN yesterday and tx with diflucan,  cx pending.     Review of Systems   Constitutional:  Negative for activity change, appetite change, chills, diaphoresis, fatigue, fever and unexpected weight change.   Eyes:  Negative for visual disturbance.   Respiratory:  Negative for cough, choking, chest tightness, shortness of breath and wheezing.    Cardiovascular:  Negative for chest pain, palpitations and leg swelling.   Gastrointestinal:  Negative for abdominal distention, abdominal pain, blood in stool, constipation, diarrhea, nausea, vomiting and reflux.   Genitourinary:  Negative for bladder incontinence, decreased urine volume, difficulty urinating, dysuria, enuresis, flank pain, frequency, hematuria, nocturia, pelvic pain, urgency, vaginal bleeding, vaginal discharge, vaginal pain and vaginal dryness.   Musculoskeletal:  Negative for arthralgias, back pain, leg pain and myalgias.   Neurological:  Negative for dizziness, vertigo, tremors, syncope, weakness, light-headedness, numbness and headaches.   Psychiatric/Behavioral:  Negative for agitation, behavioral problems, confusion, decreased concentration, dysphoric mood, hallucinations, self-injury, sleep disturbance and suicidal ideas. The patient is not nervous/anxious and is not hyperactive.        Medication List with Changes/Refills   New Medications    TIRZEPATIDE (MOUNJARO) 5 MG/0.5 ML PNIJ    Inject 5 mg into the skin every 7 days.   Current Medications    BLOOD-GLUCOSE METER,CONTINUOUS (DEXCOM  "G6 ) MISC    Use as instructed    BLOOD-GLUCOSE SENSOR (DEXCOM G6 SENSOR) MARIELENA    Use as instructed    BLOOD-GLUCOSE TRANSMITTER (DEXCOM G6 TRANSMITTER) MARIELENA    Use as instructed    BORIC ACID (PH-D) 600 MG VAGINAL SUPPOSITORY    Place 1 suppository (600 mg total) vaginally every evening. As directed    CLOTRIMAZOLE-BETAMETHASONE 1-0.05% (LOTRISONE) CREAM    Apply topically 2 (two) times daily.    CLOTRIMAZOLE-BETAMETHASONE 1-0.05% (LOTRISONE) CREAM    Apply topically 2 (two) times daily. for 14 days    ESTRADIOL (ESTRACE) 0.01 % (0.1 MG/GRAM) VAGINAL CREAM    Place 1 g vaginally every Mon, Wed, Fri.    FAMOTIDINE (PEPCID) 40 MG TABLET    Take 40 mg by mouth once daily.    FLUCONAZOLE (DIFLUCAN) 150 MG TAB    Take 1 tablet (150 mg total) by mouth every other day. for 2 doses    HYDROCORTISONE (ANUSOL-HC) 25 MG SUPPOSITORY    Place 25 mg rectally as needed.    HYDROXYCHLOROQUINE (PLAQUENIL) 200 MG TABLET    Take 1 tablet (200 mg total) by mouth once daily.    HYDROXYZINE HCL (ATARAX) 25 MG TABLET        MELOXICAM (MOBIC) 15 MG TABLET    Take 15 mg by mouth once daily.    METHEN-M.BLUE-S.PHOS-PHSAL-HYO (URIBEL) 118-10-40.8-36 MG CAP    Take 1 capsule by mouth 3 (three) times daily as needed (bladder pain).    METHOCARBAMOL (ROBAXIN) 500 MG TAB    Take 500 mg by mouth 4 (four) times daily.    NYSTATIN (MYCOSTATIN) CREAM    Apply topically 2 (two) times daily.    PANTOPRAZOLE (PROTONIX) 40 MG TABLET        PREGABALIN (LYRICA) 150 MG CAPSULE    Take 1 capsule (150 mg total) by mouth 2 (two) times daily.    PROCTOZONE-HC 2.5 % RECTAL CREAM    Place 30 g rectally as needed.   Discontinued Medications    OZEMPIC 2 MG/DOSE (8 MG/3 ML) PNIJ            Objective:     Vitals:    08/30/23 1400   BP: 125/73   Pulse: 110   SpO2: 98%   Weight: 73 kg (160 lb 14.4 oz)   Height: 4' 11" (1.499 m)        Physical Exam  Constitutional:       Appearance: Normal appearance. She is normal weight.   HENT:      Head: Normocephalic and " atraumatic.      Nose: Nose normal.   Eyes:      Conjunctiva/sclera: Conjunctivae normal.      Comments: Eyes tracking normal on exam    Cardiovascular:      Rate and Rhythm: Normal rate and regular rhythm.      Pulses: Normal pulses.      Heart sounds: Normal heart sounds. No murmur heard.     No friction rub. No gallop.   Pulmonary:      Effort: Pulmonary effort is normal. No respiratory distress.      Breath sounds: Normal breath sounds. No stridor. No wheezing, rhonchi or rales.   Abdominal:      General: Abdomen is flat.      Palpations: Abdomen is soft.      Tenderness: There is no abdominal tenderness.   Musculoskeletal:      Right lower leg: No edema.      Left lower leg: No edema.      Comments: Moves all extremities well and with good control  Normal gait observed      Skin:     General: Skin is warm and dry.      Capillary Refill: Capillary refill takes less than 2 seconds.   Neurological:      Mental Status: She is alert and oriented to person, place, and time.   Psychiatric:         Mood and Affect: Mood normal.         Behavior: Behavior normal.         Thought Content: Thought content normal.         Judgment: Judgment normal.            Assessment & Plan:     Type 2 diabetes mellitus without complication, without long-term current use of insulin  Comments:  keep log, switching to mounjaro.  2 week f/u   Orders:  -     tirzepatide (MOUNJARO) 5 mg/0.5 mL PnIj; Inject 5 mg into the skin every 7 days.  Dispense: 4 pen ; Refill: 0             -Patient instructed that our office calls back on all labs and imaging within 1 week of receiving the results. Patient instructed to reach out to our office if they have not heard from us so that we can request the results from the lab/imaging center           Tameka Dyson PA-C

## 2023-09-01 ENCOUNTER — TELEPHONE (OUTPATIENT)
Dept: OBSTETRICS AND GYNECOLOGY | Facility: CLINIC | Age: 43
End: 2023-09-01
Payer: COMMERCIAL

## 2023-09-01 ENCOUNTER — PATIENT MESSAGE (OUTPATIENT)
Dept: OBSTETRICS AND GYNECOLOGY | Facility: CLINIC | Age: 43
End: 2023-09-01
Payer: COMMERCIAL

## 2023-09-01 LAB
BACTERIAL VAGINOSIS: NEGATIVE
CANDIDA GLABRATA: NEGATIVE
CANDIDA SPECIES: NEGATIVE
TRICHOMONAS VAGINALIS: NEGATIVE

## 2023-09-01 NOTE — TELEPHONE ENCOUNTER
----- Message from Melissa Junior sent at 9/1/2023  2:29 PM CDT -----  Contact: self  Type: Staff Message  Caller: Rosa Anguiano  Call Back Number: [phone] (home)   Nature of the Call: pt calling to discuss results  Additional Information: na

## 2023-09-05 RX ORDER — NYSTATIN 100000 U/G
CREAM TOPICAL 2 TIMES DAILY
Qty: 30 G | Refills: 1 | Status: SHIPPED | OUTPATIENT
Start: 2023-09-05

## 2023-09-06 ENCOUNTER — TELEPHONE (OUTPATIENT)
Dept: UROLOGY | Facility: CLINIC | Age: 43
End: 2023-09-06
Payer: COMMERCIAL

## 2023-09-06 NOTE — TELEPHONE ENCOUNTER
Chucky Huerta spoke with [facility] and confirmed switch to Pyridium because of contradiction with medications.

## 2023-09-06 NOTE — TELEPHONE ENCOUNTER
----- Message from Beba Crawford LPN sent at 9/6/2023  4:47 PM CDT -----  Contact: [facility]    ----- Message -----  From: Abigail Nieves MA  Sent: 9/6/2023   4:03 PM CDT  To: Beba Crawford LPN      ----- Message -----  From: Elvi Nieto  Sent: 9/6/2023   2:16 PM CDT  To: Bradley Locke Staff    Type: Staff Message    Who called: [facility]  Call back number: [phone]  Reason for the call: methen-m.blue-s.phos-phsal-hyo (URIBEL) 118-10-40.8-36 mg Cap RX that was sent the other day. Pt has started another medication she has started that contradicts and needs clarification   Additional information: N/a

## 2023-09-15 ENCOUNTER — PATIENT OUTREACH (OUTPATIENT)
Dept: ADMINISTRATIVE | Facility: HOSPITAL | Age: 43
End: 2023-09-15
Payer: COMMERCIAL

## 2023-09-15 NOTE — PROGRESS NOTES
Working [facility] Nephrology Report:     Auditing chart to see if patient has had a urine micro and eGFR in 2023. Has not been done, pcp appt in 11/2/2023.

## 2023-10-30 ENCOUNTER — OFFICE VISIT (OUTPATIENT)
Dept: UROLOGY | Facility: CLINIC | Age: 43
End: 2023-10-30
Payer: COMMERCIAL

## 2023-10-30 DIAGNOSIS — N32.89 BLADDER SPASMS: ICD-10-CM

## 2023-10-30 DIAGNOSIS — R82.90 ABNORMAL URINALYSIS: Primary | ICD-10-CM

## 2023-10-30 DIAGNOSIS — R30.0 DYSURIA: ICD-10-CM

## 2023-10-30 DIAGNOSIS — N30.10 INTERSTITIAL CYSTITIS: ICD-10-CM

## 2023-10-30 LAB
BILIRUBIN, UA POC OHS: NEGATIVE
BLOOD, UA POC OHS: ABNORMAL
CLARITY, UA POC OHS: CLEAR
COLOR, UA POC OHS: YELLOW
GLUCOSE, UA POC OHS: NEGATIVE
KETONES, UA POC OHS: NEGATIVE
LEUKOCYTES, UA POC OHS: NEGATIVE
NITRITE, UA POC OHS: NEGATIVE
PH, UA POC OHS: 5.5
POC RESIDUAL URINE VOLUME: 0 ML (ref 0–100)
PROTEIN, UA POC OHS: NEGATIVE
SPECIFIC GRAVITY, UA POC OHS: 1.01
UROBILINOGEN, UA POC OHS: 0.2

## 2023-10-30 PROCEDURE — 1160F PR REVIEW ALL MEDS BY PRESCRIBER/CLIN PHARMACIST DOCUMENTED: ICD-10-PCS | Mod: CPTII,S$GLB,, | Performed by: NURSE PRACTITIONER

## 2023-10-30 PROCEDURE — 99214 PR OFFICE/OUTPT VISIT, EST, LEVL IV, 30-39 MIN: ICD-10-PCS | Mod: S$GLB,,, | Performed by: NURSE PRACTITIONER

## 2023-10-30 PROCEDURE — 99214 OFFICE O/P EST MOD 30 MIN: CPT | Mod: S$GLB,,, | Performed by: NURSE PRACTITIONER

## 2023-10-30 PROCEDURE — 1159F MED LIST DOCD IN RCRD: CPT | Mod: CPTII,S$GLB,, | Performed by: NURSE PRACTITIONER

## 2023-10-30 PROCEDURE — 1159F PR MEDICATION LIST DOCUMENTED IN MEDICAL RECORD: ICD-10-PCS | Mod: CPTII,S$GLB,, | Performed by: NURSE PRACTITIONER

## 2023-10-30 PROCEDURE — 81003 URINALYSIS AUTO W/O SCOPE: CPT | Mod: QW,S$GLB,, | Performed by: NURSE PRACTITIONER

## 2023-10-30 PROCEDURE — 81003 POCT URINALYSIS(INSTRUMENT): ICD-10-PCS | Mod: QW,S$GLB,, | Performed by: NURSE PRACTITIONER

## 2023-10-30 PROCEDURE — 51798 POCT BLADDER SCAN: ICD-10-PCS | Mod: S$GLB,,, | Performed by: NURSE PRACTITIONER

## 2023-10-30 PROCEDURE — 51798 US URINE CAPACITY MEASURE: CPT | Mod: S$GLB,,, | Performed by: NURSE PRACTITIONER

## 2023-10-30 PROCEDURE — 1160F RVW MEDS BY RX/DR IN RCRD: CPT | Mod: CPTII,S$GLB,, | Performed by: NURSE PRACTITIONER

## 2023-10-30 RX ORDER — METHOCARBAMOL 500 MG/1
500 TABLET, FILM COATED ORAL 4 TIMES DAILY PRN
Qty: 40 TABLET | Refills: 0 | Status: SHIPPED | OUTPATIENT
Start: 2023-10-30

## 2023-10-30 NOTE — PROGRESS NOTES
Subjective:       Patient ID: Rosa Anguiano is a 43 y.o. female.    Chief Complaint: interstitial cystitis      HPI: 43-year-old female, established patient, presents with complaint of burning with urination and bladder spasms.    Patient does have a history of interstitial cystitis.  She follows an IC diet.  She admits she drinks soda every day.  She also admits to eating spicy food.    She using over-the-counter medication D mannose/aloe vera combo medication.  States this does provide some relief.    She did use aloe vera on its own which did not provide any improvement.      She denies any odor urine.  Denies any fever or body aches.  Denies any blood in urine.    At last visit we gave the patient some Robaxin help with spasms.  She states this is working well.    She was put on Uribel as needed but had to switch to Pyridium due to a contraindication with another medication.  Patient states she has stopped the medication and would like to get back on Uribel.      No other urinary complaints this time.         Past Medical History:   Past Medical History:   Diagnosis Date    Acute pelvic pain, female     Breast pain     Cystitis     Diabetes mellitus     Dyspareunia, female     Endometriosis     Fibromyalgia     GERD (gastroesophageal reflux disease)     IBS (irritable bowel syndrome)     Interstitial cystitis     Irregular menstrual cycle     Leukorrhea     Osteoarthritis     Ovarian cyst     Pneumonia     YAMILET (stress urinary incontinence, female)     Vaginal yeast infection     Vulvitis        Past Surgical Historical:   Past Surgical History:   Procedure Laterality Date     SECTION      CHOLECYSTECTOMY      LAPAROSCOPY-ASSISTED VAGINAL HYSTERECTOMY      TUBAL LIGATION          Medications:   Medication List with Changes/Refills   Current Medications    BLOOD-GLUCOSE METER,CONTINUOUS (DEXCOM G6 ) MISC    Use as instructed    BLOOD-GLUCOSE SENSOR (DEXCOM G6 SENSOR) MARILEENA    Use as instructed     BLOOD-GLUCOSE TRANSMITTER (DEXCOM G6 TRANSMITTER) MARIELENA    Use as instructed    BORIC ACID (PH-D) 600 MG VAGINAL SUPPOSITORY    Place 1 suppository (600 mg total) vaginally every evening. As directed    CLOTRIMAZOLE-BETAMETHASONE 1-0.05% (LOTRISONE) CREAM    Apply topically 2 (two) times daily.    ESTRADIOL (ESTRACE) 0.01 % (0.1 MG/GRAM) VAGINAL CREAM    Place 1 g vaginally every Mon, Wed, Fri.    FAMOTIDINE (PEPCID) 40 MG TABLET    Take 40 mg by mouth once daily.    HYDROCORTISONE (ANUSOL-HC) 25 MG SUPPOSITORY    Place 25 mg rectally as needed.    HYDROXYCHLOROQUINE (PLAQUENIL) 200 MG TABLET    Take 1 tablet (200 mg total) by mouth once daily.    HYDROXYZINE HCL (ATARAX) 25 MG TABLET        MELOXICAM (MOBIC) 15 MG TABLET    Take 15 mg by mouth once daily.    NYSTATIN (MYCOSTATIN) CREAM    Apply topically 2 (two) times daily.    PANTOPRAZOLE (PROTONIX) 40 MG TABLET        PREGABALIN (LYRICA) 150 MG CAPSULE    Take 1 capsule (150 mg total) by mouth 2 (two) times daily.    PROCTOZONE-HC 2.5 % RECTAL CREAM    Place 30 g rectally as needed.   Changed and/or Refilled Medications    Modified Medication Previous Medication    METHEN-M.BLUE-S.PHOS-PHSAL-HYO (URIBEL) 118-10-40.8-36 MG CAP methen-m.blue-s.phos-phsal-hyo (URIBEL) 118-10-40.8-36 mg Cap       Take 1 capsule by mouth 3 (three) times daily as needed (bladder pain).    Take 1 capsule by mouth 3 (three) times daily as needed (bladder pain).    METHOCARBAMOL (ROBAXIN) 500 MG TAB methocarbamoL (ROBAXIN) 500 MG Tab       Take 1 tablet (500 mg total) by mouth 4 (four) times daily as needed (spasms).    Take 500 mg by mouth 4 (four) times daily.        Past Social History:   Social History     Socioeconomic History    Marital status:    Tobacco Use    Smoking status: Never    Smokeless tobacco: Never   Substance and Sexual Activity    Alcohol use: Not Currently    Drug use: Never    Sexual activity: Yes     Partners: Male     Birth control/protection: See Surgical  Hx     Comment: Hysterectomy       Allergies:   Review of patient's allergies indicates:   Allergen Reactions    Nabumetone Other (See Comments)     Diffculty swallowing        Family History:   Family History   Problem Relation Age of Onset    Colon cancer Father 60    Prostate cancer Father 60    Breast cancer Sister 66    Ovarian cancer Neg Hx     Uterine cancer Neg Hx     Melanoma Neg Hx     Pancreatic cancer Neg Hx         Review of Systems:  Review of Systems   Constitutional:  Negative for activity change and appetite change.   HENT:  Negative for congestion and dental problem.    Respiratory:  Negative for chest tightness and shortness of breath.    Cardiovascular:  Negative for chest pain.   Gastrointestinal:  Negative for abdominal distention and abdominal pain.   Genitourinary:  Positive for dysuria and pelvic pain. Negative for decreased urine volume, difficulty urinating, dyspareunia, enuresis, flank pain, frequency, genital sores, hematuria and urgency.   Musculoskeletal:  Negative for back pain and neck pain.   Allergic/Immunologic: Negative for immunocompromised state.   Neurological:  Negative for dizziness.   Hematological:  Negative for adenopathy.   Psychiatric/Behavioral:  Negative for agitation, behavioral problems and confusion.        Physical Exam:  Physical Exam  Vitals and nursing note reviewed.   Constitutional:       Appearance: She is well-developed.   HENT:      Head: Normocephalic.   Eyes:      Pupils: Pupils are equal, round, and reactive to light.   Cardiovascular:      Rate and Rhythm: Normal rate and regular rhythm.      Heart sounds: Normal heart sounds.   Pulmonary:      Effort: Pulmonary effort is normal.      Breath sounds: Normal breath sounds.   Abdominal:      General: Bowel sounds are normal.      Palpations: Abdomen is soft.   Musculoskeletal:         General: Normal range of motion.      Cervical back: Normal range of motion and neck supple.   Skin:     General: Skin is  warm and dry.   Neurological:      Mental Status: She is alert and oriented to person, place, and time.   Psychiatric:         Mood and Affect: Mood normal.         Behavior: Behavior normal.       Urinalysis:  Negative leukocytes, negative nitrates.  White blood cells 2-4, epithelial +4, bacteria +2.  Trace intact blood, red blood cells 0-2.    Bladder scan:  0 cc    Assessment/Plan:   1. Interstitial cystitis/bladder spasms:  Discussed IC diet again.    Patient continue her over-the-counter D mannose/aloe vera.    Patient provided refill of Uribel and Robaxin.      2. Dysuria/abnormal urinalysis:  Culture urine.  Will treat if indicated.  Likely contaminant.      Patient will follow-up as scheduled, sooner if needed.  Problem List Items Addressed This Visit    None  Visit Diagnoses       Abnormal urinalysis    -  Primary    Relevant Orders    Urine culture    Interstitial cystitis        Relevant Medications    methen-m.blue-s.phos-phsal-hyo (URIBEL) 118-10-40.8-36 mg Cap    Other Relevant Orders    POCT Urinalysis(Instrument)    POCT Bladder Scan    Bladder spasms        Relevant Medications    methen-m.blue-s.phos-phsal-hyo (URIBEL) 118-10-40.8-36 mg Cap    methocarbamoL (ROBAXIN) 500 MG Tab    Other Relevant Orders    POCT Urinalysis(Instrument)    POCT Bladder Scan    Dysuria

## 2023-11-01 ENCOUNTER — TELEPHONE (OUTPATIENT)
Dept: UROLOGY | Facility: CLINIC | Age: 43
End: 2023-11-01
Payer: COMMERCIAL

## 2023-11-01 LAB — URINE CULTURE, ROUTINE: NORMAL

## 2023-11-01 NOTE — TELEPHONE ENCOUNTER
----- Message from Chucky Huerta NP sent at 11/1/2023 11:34 AM CDT -----  No growth on urine culture.  Follow up as scheduled

## 2023-11-06 DIAGNOSIS — E11.9 TYPE 2 DIABETES MELLITUS WITHOUT COMPLICATION, WITHOUT LONG-TERM CURRENT USE OF INSULIN: ICD-10-CM

## 2023-11-06 RX ORDER — TIRZEPATIDE 5 MG/.5ML
5 INJECTION, SOLUTION SUBCUTANEOUS
Qty: 12 PEN | Refills: 0 | Status: SHIPPED | OUTPATIENT
Start: 2023-11-06 | End: 2024-01-02 | Stop reason: SDUPTHER

## 2023-11-08 ENCOUNTER — OFFICE VISIT (OUTPATIENT)
Dept: FAMILY MEDICINE | Facility: CLINIC | Age: 43
End: 2023-11-08
Payer: COMMERCIAL

## 2023-11-08 VITALS
SYSTOLIC BLOOD PRESSURE: 112 MMHG | OXYGEN SATURATION: 99 % | DIASTOLIC BLOOD PRESSURE: 55 MMHG | BODY MASS INDEX: 34.47 KG/M2 | WEIGHT: 171 LBS | HEIGHT: 59 IN | TEMPERATURE: 98 F | HEART RATE: 78 BPM

## 2023-11-08 DIAGNOSIS — Z12.39 ENCOUNTER FOR SCREENING FOR MALIGNANT NEOPLASM OF BREAST, UNSPECIFIED SCREENING MODALITY: ICD-10-CM

## 2023-11-08 DIAGNOSIS — J30.1 SEASONAL ALLERGIC RHINITIS DUE TO POLLEN: Primary | ICD-10-CM

## 2023-11-08 DIAGNOSIS — Z12.11 ENCOUNTER FOR SCREENING COLONOSCOPY: ICD-10-CM

## 2023-11-08 PROCEDURE — 1159F MED LIST DOCD IN RCRD: CPT | Mod: CPTII,S$GLB,, | Performed by: NURSE PRACTITIONER

## 2023-11-08 PROCEDURE — 1160F PR REVIEW ALL MEDS BY PRESCRIBER/CLIN PHARMACIST DOCUMENTED: ICD-10-PCS | Mod: CPTII,S$GLB,, | Performed by: NURSE PRACTITIONER

## 2023-11-08 PROCEDURE — 1160F RVW MEDS BY RX/DR IN RCRD: CPT | Mod: CPTII,S$GLB,, | Performed by: NURSE PRACTITIONER

## 2023-11-08 PROCEDURE — 1159F PR MEDICATION LIST DOCUMENTED IN MEDICAL RECORD: ICD-10-PCS | Mod: CPTII,S$GLB,, | Performed by: NURSE PRACTITIONER

## 2023-11-08 PROCEDURE — 3078F PR MOST RECENT DIASTOLIC BLOOD PRESSURE < 80 MM HG: ICD-10-PCS | Mod: CPTII,S$GLB,, | Performed by: NURSE PRACTITIONER

## 2023-11-08 PROCEDURE — 3074F SYST BP LT 130 MM HG: CPT | Mod: CPTII,S$GLB,, | Performed by: NURSE PRACTITIONER

## 2023-11-08 PROCEDURE — 3008F PR BODY MASS INDEX (BMI) DOCUMENTED: ICD-10-PCS | Mod: CPTII,S$GLB,, | Performed by: NURSE PRACTITIONER

## 2023-11-08 PROCEDURE — 3074F PR MOST RECENT SYSTOLIC BLOOD PRESSURE < 130 MM HG: ICD-10-PCS | Mod: CPTII,S$GLB,, | Performed by: NURSE PRACTITIONER

## 2023-11-08 PROCEDURE — 3008F BODY MASS INDEX DOCD: CPT | Mod: CPTII,S$GLB,, | Performed by: NURSE PRACTITIONER

## 2023-11-08 PROCEDURE — 3078F DIAST BP <80 MM HG: CPT | Mod: CPTII,S$GLB,, | Performed by: NURSE PRACTITIONER

## 2023-11-08 PROCEDURE — 99213 OFFICE O/P EST LOW 20 MIN: CPT | Mod: S$GLB,,, | Performed by: NURSE PRACTITIONER

## 2023-11-08 PROCEDURE — 99213 PR OFFICE/OUTPT VISIT, EST, LEVL III, 20-29 MIN: ICD-10-PCS | Mod: S$GLB,,, | Performed by: NURSE PRACTITIONER

## 2023-11-08 NOTE — PROGRESS NOTES
"Patient ID: Rosa Anguiano  MRN: [id number]      History of Present Illness:  The patient is a 43 y.o. Other female who presents to clinic for evaluation and management with a chief complaint of Otalgia   She is also c/o recent "terrible hemorrhoid", she used otc cream and suppositories and "finally is better".   Father has had colon cancer, sister  of breast cancer  Pt notes she has several bowel movements daily.     Allergies: Patient is allergic to nabumetone.     Smoking status:  Social History     Tobacco Use   Smoking Status Never   Smokeless Tobacco Never       Problem List:  Patient Active Problem List   Diagnosis    Rheumatoid arthritis involving multiple sites with positive rheumatoid factor    Fibromyalgia    Class 1 obesity due to excess calories with serious comorbidity and body mass index (BMI) of 34.0 to 34.9 in adult    Type 2 diabetes mellitus, without long-term current use of insulin    GERD (gastroesophageal reflux disease)    Muscle spasm        Current Medications:  Current Outpatient Medications   Medication Instructions    blood-glucose meter,continuous (DEXCOM G6 ) Misc Use as instructed    blood-glucose sensor (DEXCOM G6 SENSOR) Kath Use as instructed    blood-glucose transmitter (DEXCOM G6 TRANSMITTER) Kath Use as instructed    boric acid (PH-D) 600 mg, Vaginal, Nightly, As directed    clotrimazole-betamethasone 1-0.05% (LOTRISONE) cream Topical (Top), 2 times daily    estradioL (ESTRACE) 1 g, Vaginal, Every Mon, Wed, Fri    famotidine (PEPCID) 40 mg, Oral, Daily    hydrocortisone (ANUSOL-HC) 25 mg, Rectal, As needed (PRN)    hydroxychloroquine (PLAQUENIL) 200 mg, Oral, Daily    hydrOXYzine HCL (ATARAX) 25 MG tablet No dose, route, or frequency recorded.    meloxicam (MOBIC) 15 mg, Oral, Daily    methen-m.blue-s.phos-phsal-hyo (URIBEL) 118-10-40.8-36 mg Cap 1 capsule, Oral, 3 times daily PRN    methocarbamoL (ROBAXIN) 500 mg, Oral, 4 times daily PRN    MOUNJARO 5 mg, Subcutaneous, " "Every 7 days    nystatin (MYCOSTATIN) cream Topical (Top), 2 times daily    pantoprazole (PROTONIX) 40 MG tablet No dose, route, or frequency recorded.    pregabalin (LYRICA) 150 mg, Oral, 2 times daily    PROCTOZONE-HC 30 g, Rectal, As needed (PRN)       Review of Systems   Constitutional: Negative.    HENT:  Positive for nasal congestion and ear pain.    Eyes: Negative.    Respiratory: Negative.     Cardiovascular: Negative.    Gastrointestinal: Negative.    Endocrine: Negative.    Genitourinary: Negative.    Musculoskeletal: Negative.    Integumentary:  Negative.   Allergic/Immunologic: Negative.    Neurological: Negative.    Hematological: Negative.    Psychiatric/Behavioral: Negative.          Visit Vitals  BP (!) 112/55 (BP Location: Right arm, Patient Position: Sitting)   Pulse 78   Temp 98.4 °F (36.9 °C)   Ht 4' 11" (1.499 m)   Wt 77.6 kg (171 lb)   SpO2 99%   BMI 34.54 kg/m²       Physical Exam  Vitals and nursing note reviewed.   Constitutional:       Appearance: Normal appearance.   HENT:      Head: Normocephalic.      Nose:      Comments: + for clear nasal drainage , nasal turbinates enlarged especially on the left     +cobblestoning and erythema to oropharynx     Bilateral ears, TM WNL  Eyes:      Conjunctiva/sclera: Conjunctivae normal.   Cardiovascular:      Rate and Rhythm: Normal rate and regular rhythm.   Pulmonary:      Effort: Pulmonary effort is normal.      Breath sounds: Normal breath sounds.   Abdominal:      General: Bowel sounds are normal.      Palpations: Abdomen is soft.   Musculoskeletal:         General: Normal range of motion.      Cervical back: Normal range of motion.   Skin:     General: Skin is warm and dry.   Neurological:      General: No focal deficit present.      Mental Status: She is alert.   Psychiatric:         Mood and Affect: Mood normal.         Behavior: Behavior normal.          Assessment & Plan:  1. Seasonal allergic rhinitis due to pollen    Advised use of normal " saline irrigation, use of flonase and an oral antihistamine to reduce symptoms.     2. Encounter for screening colonoscopy  -     Ambulatory referral/consult to Gastroenterology; Future; Expected date: 11/15/2023    3. Encounter for screening for malignant neoplasm of breast, unspecified screening modality  -     Mammo Digital Screening Bilat; Future; Expected date: 11/08/2023         Future Appointments   Date Time Provider Department Center   1/2/2024 10:20 AM Chucky Huerta NP [facility] UROLOGY  [place]       Lab Frequency Next Occurrence   Ambulatory referral/consult to Rheumatology Once 11/16/2022   Ambulatory referral/consult to Gastroenterology Once 11/15/2023   Mammo Digital Screening Bilat Once 11/08/2023     Follow up advised to schedule a diabetic visit, needs screening labs..        Peyton Hernández NP

## 2023-12-08 ENCOUNTER — OFFICE VISIT (OUTPATIENT)
Dept: FAMILY MEDICINE | Facility: CLINIC | Age: 43
End: 2023-12-08
Payer: COMMERCIAL

## 2023-12-08 VITALS
OXYGEN SATURATION: 99 % | DIASTOLIC BLOOD PRESSURE: 80 MMHG | WEIGHT: 172.63 LBS | SYSTOLIC BLOOD PRESSURE: 118 MMHG | HEIGHT: 59 IN | HEART RATE: 81 BPM | BODY MASS INDEX: 34.8 KG/M2

## 2023-12-08 DIAGNOSIS — H10.9 BACTERIAL CONJUNCTIVITIS OF BOTH EYES: ICD-10-CM

## 2023-12-08 DIAGNOSIS — J01.91 ACUTE RECURRENT SINUSITIS, UNSPECIFIED LOCATION: ICD-10-CM

## 2023-12-08 DIAGNOSIS — Z63.8 STRESS DUE TO FAMILY TENSION: Primary | ICD-10-CM

## 2023-12-08 DIAGNOSIS — H65.02 NON-RECURRENT ACUTE SEROUS OTITIS MEDIA OF LEFT EAR: ICD-10-CM

## 2023-12-08 DIAGNOSIS — B96.89 BACTERIAL CONJUNCTIVITIS OF BOTH EYES: ICD-10-CM

## 2023-12-08 PROCEDURE — 1159F MED LIST DOCD IN RCRD: CPT | Mod: CPTII,S$GLB,, | Performed by: NURSE PRACTITIONER

## 2023-12-08 PROCEDURE — 3079F DIAST BP 80-89 MM HG: CPT | Mod: CPTII,S$GLB,, | Performed by: NURSE PRACTITIONER

## 2023-12-08 PROCEDURE — 1159F PR MEDICATION LIST DOCUMENTED IN MEDICAL RECORD: ICD-10-PCS | Mod: CPTII,S$GLB,, | Performed by: NURSE PRACTITIONER

## 2023-12-08 PROCEDURE — 3074F PR MOST RECENT SYSTOLIC BLOOD PRESSURE < 130 MM HG: ICD-10-PCS | Mod: CPTII,S$GLB,, | Performed by: NURSE PRACTITIONER

## 2023-12-08 PROCEDURE — 3079F PR MOST RECENT DIASTOLIC BLOOD PRESSURE 80-89 MM HG: ICD-10-PCS | Mod: CPTII,S$GLB,, | Performed by: NURSE PRACTITIONER

## 2023-12-08 PROCEDURE — 3074F SYST BP LT 130 MM HG: CPT | Mod: CPTII,S$GLB,, | Performed by: NURSE PRACTITIONER

## 2023-12-08 PROCEDURE — 3008F PR BODY MASS INDEX (BMI) DOCUMENTED: ICD-10-PCS | Mod: CPTII,S$GLB,, | Performed by: NURSE PRACTITIONER

## 2023-12-08 PROCEDURE — 99214 PR OFFICE/OUTPT VISIT, EST, LEVL IV, 30-39 MIN: ICD-10-PCS | Mod: S$GLB,,, | Performed by: NURSE PRACTITIONER

## 2023-12-08 PROCEDURE — 3008F BODY MASS INDEX DOCD: CPT | Mod: CPTII,S$GLB,, | Performed by: NURSE PRACTITIONER

## 2023-12-08 PROCEDURE — 99214 OFFICE O/P EST MOD 30 MIN: CPT | Mod: S$GLB,,, | Performed by: NURSE PRACTITIONER

## 2023-12-08 PROCEDURE — 1160F PR REVIEW ALL MEDS BY PRESCRIBER/CLIN PHARMACIST DOCUMENTED: ICD-10-PCS | Mod: CPTII,S$GLB,, | Performed by: NURSE PRACTITIONER

## 2023-12-08 PROCEDURE — 1160F RVW MEDS BY RX/DR IN RCRD: CPT | Mod: CPTII,S$GLB,, | Performed by: NURSE PRACTITIONER

## 2023-12-08 RX ORDER — METHOTREXATE 2.5 MG/1
2.5 TABLET ORAL
COMMUNITY
Start: 2023-12-07

## 2023-12-08 RX ORDER — CIPROFLOXACIN HYDROCHLORIDE 3 MG/ML
1 SOLUTION/ DROPS OPHTHALMIC EVERY 4 HOURS
Qty: 5 ML | Refills: 1 | Status: SHIPPED | OUTPATIENT
Start: 2023-12-08 | End: 2024-01-03

## 2023-12-08 RX ORDER — AMOXICILLIN AND CLAVULANATE POTASSIUM 875; 125 MG/1; MG/1
1 TABLET, FILM COATED ORAL EVERY 12 HOURS
Qty: 20 TABLET | Refills: 0 | Status: SHIPPED | OUTPATIENT
Start: 2023-12-08 | End: 2024-01-03

## 2023-12-08 RX ORDER — ONDANSETRON 4 MG/1
4 TABLET, ORALLY DISINTEGRATING ORAL EVERY 6 HOURS PRN
COMMUNITY
Start: 2023-11-17

## 2023-12-08 RX ORDER — FLUCONAZOLE 100 MG/1
100 TABLET ORAL DAILY
Qty: 5 TABLET | Refills: 0 | Status: SHIPPED | OUTPATIENT
Start: 2023-12-08 | End: 2024-01-03

## 2023-12-08 SDOH — SOCIAL DETERMINANTS OF HEALTH (SDOH): OTHER SPECIFIED PROBLEMS RELATED TO PRIMARY SUPPORT GROUP: Z63.8

## 2023-12-08 NOTE — PROGRESS NOTES
"Patient ID: Rosa Anguiano  MRN: [id number]      History of Present Illness:  The patient is a 43 y.o. Other female who presents to clinic for evaluation and management with a chief complaint of Cough and Sore Throat   Pt notes she has had sx for several weeks. She is taking Zyrtec, using normal saline mist followed by flonase daily. Both eyes are red and she said she woke up this am with eyes "matted shut".   She is also c/o bilateral ear pain and scratchy throat as well as cough which is worse at night.     She is also requesting a referral for counseling as she is having difficulty with one of her adult children. Pt said she is not depressed but would like to speak to a counselor to help her learn how best to deal with the relationship she has with her daughter.     Allergies: Patient is allergic to nabumetone.     Smoking status:  Social History     Tobacco Use   Smoking Status Never   Smokeless Tobacco Never       Problem List:  Patient Active Problem List   Diagnosis    Rheumatoid arthritis involving multiple sites with positive rheumatoid factor    Fibromyalgia    Class 1 obesity due to excess calories with serious comorbidity and body mass index (BMI) of 34.0 to 34.9 in adult    Type 2 diabetes mellitus, without long-term current use of insulin    GERD (gastroesophageal reflux disease)    Muscle spasm        Current Medications:  Current Outpatient Medications   Medication Instructions    amoxicillin-clavulanate 875-125mg (AUGMENTIN) 875-125 mg per tablet 1 tablet, Oral, Every 12 hours    blood-glucose meter,continuous (DEXCOM G6 ) Misc Use as instructed    blood-glucose sensor (DEXCOM G6 SENSOR) Kath Use as instructed    blood-glucose transmitter (DEXCOM G6 TRANSMITTER) Kath Use as instructed    boric acid (PH-D) 600 mg, Vaginal, Nightly, As directed    ciprofloxacin HCl (CILOXAN) 0.3 % ophthalmic solution 1 drop, Both Eyes, Every 4 hours    clotrimazole-betamethasone 1-0.05% (LOTRISONE) cream Topical " (Top), 2 times daily    estradioL (ESTRACE) 1 g, Vaginal, Every Mon, Wed, Fri    famotidine (PEPCID) 40 mg, Oral, Daily    fluconazole (DIFLUCAN) 100 mg, Oral, Daily    hydrocortisone (ANUSOL-HC) 25 mg, Rectal, As needed (PRN)    hydroxychloroquine (PLAQUENIL) 200 mg, Oral, Daily    hydrOXYzine HCL (ATARAX) 25 MG tablet No dose, route, or frequency recorded.    meloxicam (MOBIC) 15 mg, Oral, Daily    methen-m.blue-s.phos-phsal-hyo (URIBEL) 118-10-40.8-36 mg Cap 1 capsule, Oral, 3 times daily PRN    methocarbamoL (ROBAXIN) 500 mg, Oral, 4 times daily PRN    methotrexate 2.5 mg, Oral, Every 7 days    MOUNJARO 5 mg, Subcutaneous, Every 7 days    nystatin (MYCOSTATIN) cream Topical (Top), 2 times daily    ondansetron (ZOFRAN-ODT) 4 mg, Oral, Every 6 hours PRN    pantoprazole (PROTONIX) 40 MG tablet No dose, route, or frequency recorded.    pregabalin (LYRICA) 150 mg, Oral, 2 times daily    PROCTOZONE-HC 30 g, Rectal, As needed (PRN)             Review of Systems   Constitutional: Negative.  Negative for activity change, appetite change and fever.   HENT:  Positive for nasal congestion, postnasal drip, rhinorrhea and sore throat.    Eyes:  Positive for pain, discharge, redness and itching.   Respiratory: Negative.  Negative for chest tightness, shortness of breath and wheezing.    Cardiovascular: Negative.  Negative for chest pain and leg swelling.   Gastrointestinal: Negative.  Negative for abdominal pain, constipation, diarrhea and nausea.   Endocrine: Negative.  Negative for cold intolerance and heat intolerance.   Genitourinary: Negative.  Negative for difficulty urinating, dysuria, frequency and urgency.   Musculoskeletal: Negative.  Negative for back pain and joint swelling.   Integumentary:  Negative for rash. Negative.   Allergic/Immunologic: Negative.    Neurological: Negative.  Negative for dizziness and numbness.   Hematological: Negative.    Psychiatric/Behavioral: Negative.  Negative for agitation and  "behavioral problems.         Visit Vitals  /80   Pulse 81   Ht 4' 11" (1.499 m)   Wt 78.3 kg (172 lb 9.6 oz)   SpO2 99%   BMI 34.86 kg/m²       Physical Exam  Constitutional:       Appearance: Normal appearance.   HENT:      Head: Atraumatic.      Ears:      Comments: Left TM erythematous, edeamtous     Right TM intact, WNL     Mouth/Throat:      Mouth: Mucous membranes are moist.      Comments: Tonsils enlarged, posterior pharynx erythematous     Nasal passages are boggy, enlarged turbinates more pronounced on the left   Eyes:      Comments: Sclera is injected ou , no drainage at this time, however pt notes thick "gunk" this am    Cardiovascular:      Rate and Rhythm: Normal rate and regular rhythm.   Pulmonary:      Effort: Pulmonary effort is normal.      Breath sounds: Normal breath sounds.   Skin:     General: Skin is warm and dry.   Neurological:      General: No focal deficit present.      Mental Status: She is oriented to person, place, and time.   Psychiatric:         Mood and Affect: Mood normal.         Behavior: Behavior normal.          Assessment & Plan:  1. Stress due to family tension  -     Ambulatory referral/consult to Psychiatry; Future; Expected date: 12/15/2023    2. Non-recurrent acute serous otitis media of left ear  -     amoxicillin-clavulanate 875-125mg (AUGMENTIN) 875-125 mg per tablet; Take 1 tablet by mouth every 12 (twelve) hours.  Dispense: 20 tablet; Refill: 0  Take with food to avoid GI upset, increase fluid intake, avoid putting anything in ear, keep dry    3. Acute recurrent sinusitis, unspecified location  -     amoxicillin-clavulanate 875-125mg (AUGMENTIN) 875-125 mg per tablet; Take 1 tablet by mouth every 12 (twelve) hours.  Dispense: 20 tablet; Refill: 0  Advised use of normal saline irrigation, use of flonase and an oral antihistamine to reduce symptoms.     4. Bacterial conjunctivitis of both eyes  -     ciprofloxacin HCl (CILOXAN) 0.3 % ophthalmic solution; Place 1 " drop into both eyes every 4 (four) hours.  Dispense: 5 mL; Refill: 1  May clean eyes with warm compresses, use drops as directed . Discussed this is very contagious and reminded to use good handwashing.   Other orders  -     fluconazole (DIFLUCAN) 100 MG tablet; Take 1 tablet (100 mg total) by mouth once daily.  Dispense: 5 tablet; Refill: 0         Future Appointments   Date Time Provider Department Center   1/2/2024 10:20 AM Chucky Huerta NP [facility] UROLOGY  [place]         Lab Frequency Next Occurrence   Ambulatory referral/consult to Rheumatology Once 11/16/2022   Ambulatory referral/consult to Gastroenterology Once 11/15/2023       Follow up if symptoms worsen or fail to improve.      Peyton Hernández NP

## 2024-01-02 ENCOUNTER — OFFICE VISIT (OUTPATIENT)
Dept: UROLOGY | Facility: CLINIC | Age: 44
End: 2024-01-02
Payer: COMMERCIAL

## 2024-01-02 DIAGNOSIS — N30.10 INTERSTITIAL CYSTITIS: Primary | ICD-10-CM

## 2024-01-02 DIAGNOSIS — E11.9 TYPE 2 DIABETES MELLITUS WITHOUT COMPLICATION, WITHOUT LONG-TERM CURRENT USE OF INSULIN: ICD-10-CM

## 2024-01-02 DIAGNOSIS — N39.3 STRESS INCONTINENCE: ICD-10-CM

## 2024-01-02 LAB
BILIRUBIN, UA POC OHS: NEGATIVE
BLOOD, UA POC OHS: NEGATIVE
CLARITY, UA POC OHS: CLEAR
COLOR, UA POC OHS: YELLOW
GLUCOSE, UA POC OHS: NEGATIVE
KETONES, UA POC OHS: ABNORMAL
LEUKOCYTES, UA POC OHS: NEGATIVE
NITRITE, UA POC OHS: NEGATIVE
PH, UA POC OHS: 5.5
POC RESIDUAL URINE VOLUME: 0 ML (ref 0–100)
PROTEIN, UA POC OHS: NEGATIVE
SPECIFIC GRAVITY, UA POC OHS: >=1.03
UROBILINOGEN, UA POC OHS: 0.2

## 2024-01-02 PROCEDURE — 1160F RVW MEDS BY RX/DR IN RCRD: CPT | Mod: CPTII,S$GLB,, | Performed by: NURSE PRACTITIONER

## 2024-01-02 PROCEDURE — 51798 US URINE CAPACITY MEASURE: CPT | Mod: S$GLB,,, | Performed by: NURSE PRACTITIONER

## 2024-01-02 PROCEDURE — 1159F MED LIST DOCD IN RCRD: CPT | Mod: CPTII,S$GLB,, | Performed by: NURSE PRACTITIONER

## 2024-01-02 PROCEDURE — 99214 OFFICE O/P EST MOD 30 MIN: CPT | Mod: S$GLB,,, | Performed by: NURSE PRACTITIONER

## 2024-01-02 PROCEDURE — 81003 URINALYSIS AUTO W/O SCOPE: CPT | Mod: QW,S$GLB,, | Performed by: NURSE PRACTITIONER

## 2024-01-02 RX ORDER — TIRZEPATIDE 5 MG/.5ML
5 INJECTION, SOLUTION SUBCUTANEOUS
Qty: 12 PEN | Refills: 0 | Status: SHIPPED | OUTPATIENT
Start: 2024-01-02 | End: 2024-03-26

## 2024-01-02 NOTE — PROGRESS NOTES
Subjective:       Patient ID: Rosa Anguiano is a 43 y.o. female.    Chief Complaint: Follow-up (IC, stress incontinence, hx of abnormal UA)      HPI: 43-year-old female, established patient, last seen in 2023.    Patient has history of interstitial cystitis.    Patient does drink soda daily and eat spicy food.    She is on D mannose and an aloe vera combination pill.    She also uses Uribel and Robaxin help with bladder spasms.    Patient states he stopped Ozempic.  States the itching and pain she was having has improved since stopping Ozempic.  She was also put on methotrexate but stopped that as well due to side effects.    She does have history of stress incontinence.  States she will have leakage with coughing or sneezing.  Goes through about 4 pads per day.    At this time she denies any odor urine.  Denies any fever or body aches.  Denies any blood in the urine.    No other urinary complaints at this time.         Past Medical History:   Past Medical History:   Diagnosis Date    Acute pelvic pain, female     Breast pain     Cystitis     Diabetes mellitus     Dyspareunia, female     Endometriosis     Fibromyalgia     GERD (gastroesophageal reflux disease)     IBS (irritable bowel syndrome)     Interstitial cystitis     Irregular menstrual cycle     Leukorrhea     Osteoarthritis     Ovarian cyst     Pneumonia     YAMILET (stress urinary incontinence, female)     Vaginal yeast infection     Vulvitis        Past Surgical Historical:   Past Surgical History:   Procedure Laterality Date     SECTION      CHOLECYSTECTOMY      LAPAROSCOPY-ASSISTED VAGINAL HYSTERECTOMY      TUBAL LIGATION          Medications:   Medication List with Changes/Refills   Current Medications    AMOXICILLIN-CLAVULANATE 875-125MG (AUGMENTIN) 875-125 MG PER TABLET    Take 1 tablet by mouth every 12 (twelve) hours.    BLOOD-GLUCOSE METER,CONTINUOUS (DEXCOM G6 ) MISC    Use as instructed    BLOOD-GLUCOSE SENSOR (DEXCOM G6  SENSOR) MARIELENA    Use as instructed    BLOOD-GLUCOSE TRANSMITTER (DEXCOM G6 TRANSMITTER) MARIELENA    Use as instructed    BORIC ACID (PH-D) 600 MG VAGINAL SUPPOSITORY    Place 1 suppository (600 mg total) vaginally every evening. As directed    CIPROFLOXACIN HCL (CILOXAN) 0.3 % OPHTHALMIC SOLUTION    Place 1 drop into both eyes every 4 (four) hours.    CLOTRIMAZOLE-BETAMETHASONE 1-0.05% (LOTRISONE) CREAM    Apply topically 2 (two) times daily.    ESTRADIOL (ESTRACE) 0.01 % (0.1 MG/GRAM) VAGINAL CREAM    Place 1 g vaginally every Mon, Wed, Fri.    FAMOTIDINE (PEPCID) 40 MG TABLET    Take 40 mg by mouth once daily.    FLUCONAZOLE (DIFLUCAN) 100 MG TABLET    Take 1 tablet (100 mg total) by mouth once daily.    FOLIC ACID ORAL    Take by mouth.    HYDROCORTISONE (ANUSOL-HC) 25 MG SUPPOSITORY    Place 25 mg rectally as needed.    HYDROXYCHLOROQUINE (PLAQUENIL) 200 MG TABLET    Take 1 tablet (200 mg total) by mouth once daily.    HYDROXYZINE HCL (ATARAX) 25 MG TABLET        MELOXICAM (MOBIC) 15 MG TABLET    Take 15 mg by mouth once daily.    METHEN-TITIBLUE-S.PHOS-PHSAL-HYO (URIBEL) 118-10-40.8-36 MG CAP    Take 1 capsule by mouth 3 (three) times daily as needed (bladder pain).    METHOCARBAMOL (ROBAXIN) 500 MG TAB    Take 1 tablet (500 mg total) by mouth 4 (four) times daily as needed (spasms).    METHOTREXATE 2.5 MG TAB    Take 2.5 mg by mouth every 7 days.    NYSTATIN (MYCOSTATIN) CREAM    Apply topically 2 (two) times daily.    ONDANSETRON (ZOFRAN-ODT) 4 MG TBDL    Take 4 mg by mouth every 6 (six) hours as needed.    PANTOPRAZOLE (PROTONIX) 40 MG TABLET        PREGABALIN (LYRICA) 150 MG CAPSULE    Take 1 capsule (150 mg total) by mouth 2 (two) times daily.    PROCTOZONE-HC 2.5 % RECTAL CREAM    Place 30 g rectally as needed.    TIRZEPATIDE (MOUNJARO) 5 MG/0.5 ML PNIJ    INJECT 5 MG INTO THE SKIN EVERY 7 DAYS.        Past Social History:   Social History     Socioeconomic History    Marital status:    Tobacco Use     Smoking status: Never    Smokeless tobacco: Never   Substance and Sexual Activity    Alcohol use: Not Currently    Drug use: Never    Sexual activity: Yes     Partners: Male     Birth control/protection: See Surgical Hx     Comment: Hysterectomy       Allergies:   Review of patient's allergies indicates:   Allergen Reactions    Nabumetone Other (See Comments)     Diffculty swallowing        Family History:   Family History   Problem Relation Age of Onset    Colon cancer Father 60    Prostate cancer Father 60    Breast cancer Sister 66    Ovarian cancer Neg Hx     Uterine cancer Neg Hx     Melanoma Neg Hx     Pancreatic cancer Neg Hx         Review of Systems:  Review of Systems   Constitutional:  Negative for activity change and appetite change.   HENT:  Negative for congestion and dental problem.    Respiratory:  Negative for chest tightness and shortness of breath.    Cardiovascular:  Negative for chest pain.   Gastrointestinal:  Negative for abdominal distention and abdominal pain.   Genitourinary:  Negative for decreased urine volume, difficulty urinating, dyspareunia, dysuria, enuresis, flank pain, frequency, genital sores, hematuria, pelvic pain and urgency.   Musculoskeletal:  Negative for back pain and neck pain.   Allergic/Immunologic: Negative for immunocompromised state.   Neurological:  Negative for dizziness.   Hematological:  Negative for adenopathy.   Psychiatric/Behavioral:  Negative for agitation, behavioral problems and confusion.        Physical Exam:  Physical Exam  Vitals and nursing note reviewed.   Constitutional:       Appearance: She is well-developed.   HENT:      Head: Normocephalic.   Eyes:      Pupils: Pupils are equal, round, and reactive to light.   Cardiovascular:      Rate and Rhythm: Normal rate and regular rhythm.      Heart sounds: Normal heart sounds.   Pulmonary:      Effort: Pulmonary effort is normal.      Breath sounds: Normal breath sounds.   Abdominal:      General: Bowel  sounds are normal.      Palpations: Abdomen is soft.   Musculoskeletal:         General: Normal range of motion.      Cervical back: Normal range of motion and neck supple.   Skin:     General: Skin is warm and dry.   Neurological:      Mental Status: She is alert and oriented to person, place, and time.   Psychiatric:         Mood and Affect: Mood normal.         Behavior: Behavior normal.       Urinalysis:  Trace ketones.  Otherwise normal.    Bladder scan: 0 cc    Assessment/Plan:   1. Interstitial cystitis:  Patient will continue D mannose and aloe vera combo pill.    She will continue Uribel and Robaxin as needed.    For any flare ups.      2. Stress incontinence:  Patient admits to going to 4 pads per day.    Did discuss surgical intervention.  Patient not interested at this time.      Follow-up 6 months, sooner if needed.  Problem List Items Addressed This Visit    None  Visit Diagnoses       Interstitial cystitis    -  Primary    Relevant Orders    POCT Urinalysis(Instrument)    POCT Bladder Scan    Stress incontinence

## 2024-01-03 ENCOUNTER — OFFICE VISIT (OUTPATIENT)
Dept: FAMILY MEDICINE | Facility: CLINIC | Age: 44
End: 2024-01-03
Payer: COMMERCIAL

## 2024-01-03 VITALS
OXYGEN SATURATION: 98 % | WEIGHT: 171 LBS | HEART RATE: 107 BPM | HEIGHT: 59 IN | DIASTOLIC BLOOD PRESSURE: 82 MMHG | SYSTOLIC BLOOD PRESSURE: 114 MMHG | BODY MASS INDEX: 34.47 KG/M2

## 2024-01-03 DIAGNOSIS — J30.89 NON-SEASONAL ALLERGIC RHINITIS, UNSPECIFIED TRIGGER: ICD-10-CM

## 2024-01-03 DIAGNOSIS — E11.9 DIABETES MELLITUS WITHOUT COMPLICATION: Primary | ICD-10-CM

## 2024-01-03 DIAGNOSIS — H92.03 OTALGIA OF BOTH EARS: ICD-10-CM

## 2024-01-03 LAB — GLUCOSE SERPL-MCNC: 155 MG/DL (ref 70–110)

## 2024-01-03 PROCEDURE — 1160F RVW MEDS BY RX/DR IN RCRD: CPT | Mod: CPTII,S$GLB,, | Performed by: NURSE PRACTITIONER

## 2024-01-03 PROCEDURE — 3074F SYST BP LT 130 MM HG: CPT | Mod: CPTII,S$GLB,, | Performed by: NURSE PRACTITIONER

## 2024-01-03 PROCEDURE — 3079F DIAST BP 80-89 MM HG: CPT | Mod: CPTII,S$GLB,, | Performed by: NURSE PRACTITIONER

## 2024-01-03 PROCEDURE — 82962 GLUCOSE BLOOD TEST: CPT | Mod: ,,, | Performed by: NURSE PRACTITIONER

## 2024-01-03 PROCEDURE — 3008F BODY MASS INDEX DOCD: CPT | Mod: CPTII,S$GLB,, | Performed by: NURSE PRACTITIONER

## 2024-01-03 PROCEDURE — 1159F MED LIST DOCD IN RCRD: CPT | Mod: CPTII,S$GLB,, | Performed by: NURSE PRACTITIONER

## 2024-01-03 PROCEDURE — 99214 OFFICE O/P EST MOD 30 MIN: CPT | Mod: S$GLB,,, | Performed by: NURSE PRACTITIONER

## 2024-01-03 RX ORDER — ACETIC ACID 20.65 MG/ML
4 SOLUTION AURICULAR (OTIC) 3 TIMES DAILY
Qty: 20 ML | Refills: 0 | Status: SHIPPED | OUTPATIENT
Start: 2024-01-03 | End: 2024-01-13

## 2024-01-03 RX ORDER — AZELASTINE 1 MG/ML
1 SPRAY, METERED NASAL 2 TIMES DAILY
Qty: 30 ML | Refills: 0 | Status: SHIPPED | OUTPATIENT
Start: 2024-01-03 | End: 2025-01-02

## 2024-01-03 NOTE — PROGRESS NOTES
"Patient ID: Rosa Anguiano  MRN: [id number]      History of Present Illness:  The patient is a 43 y.o. Other female who presents to clinic for evaluation and management with a chief complaint of Other (Right earache)   Pt notes she recently saw her rheumatologist , Dr. Flores and she d/c'd the plaquenil and has started her on methotrexate. Pt said it "makes her ears hurt". Today she is c/o right ear pain and said she had a 101 temp last night and the night before, but denies any other sx, no nasal congestion, cough or sore throat. She does have a headache along the right side of her head , but said she saw her dentist about 4 months ago and goes routinely.     Allergies: Patient is allergic to nabumetone and dulaglutide.     Smoking status:  Social History     Tobacco Use   Smoking Status Never   Smokeless Tobacco Never       Problem List:  Patient Active Problem List   Diagnosis    Rheumatoid arthritis involving multiple sites with positive rheumatoid factor    Fibromyalgia    Class 1 obesity due to excess calories with serious comorbidity and body mass index (BMI) of 34.0 to 34.9 in adult    Type 2 diabetes mellitus, without long-term current use of insulin    GERD (gastroesophageal reflux disease)    Muscle spasm        Current Medications:  Current Outpatient Medications   Medication Instructions    acetic acid (VOSOL) 2 % otic solution 4 drops, Both Ears, 3 times daily    azelastine (ASTELIN) 137 mcg, Nasal, 2 times daily    blood-glucose meter,continuous (DEXCOM G6 ) Misc Use as instructed    blood-glucose sensor (DEXCOM G6 SENSOR) Kath Use as instructed    blood-glucose transmitter (DEXCOM G6 TRANSMITTER) Kath Use as instructed    boric acid (PH-D) 600 mg, Vaginal, Nightly, As directed    clotrimazole-betamethasone 1-0.05% (LOTRISONE) cream Topical (Top), 2 times daily    estradioL (ESTRACE) 1 g, Vaginal, Every Mon, Wed, Fri    famotidine (PEPCID) 40 mg, Oral, Daily    FOLIC ACID ORAL Oral    hydrocortisone " "(ANUSOL-HC) 25 mg, Rectal, As needed (PRN)    hydrOXYzine HCL (ATARAX) 25 MG tablet No dose, route, or frequency recorded.    meloxicam (MOBIC) 15 mg, Oral, Daily    methen-m.blue-s.phos-phsal-hyo (URIBEL) 118-10-40.8-36 mg Cap 1 capsule, Oral, 3 times daily PRN    methocarbamoL (ROBAXIN) 500 mg, Oral, 4 times daily PRN    methotrexate 2.5 mg, Oral, Every 7 days    MOUNJARO 5 mg, Subcutaneous, Every 7 days    nystatin (MYCOSTATIN) cream Topical (Top), 2 times daily    ondansetron (ZOFRAN-ODT) 4 mg, Oral, Every 6 hours PRN    pantoprazole (PROTONIX) 40 MG tablet No dose, route, or frequency recorded.    pregabalin (LYRICA) 150 mg, Oral, 2 times daily    PROCTOZONE-HC 30 g, Rectal, As needed (PRN)             Review of Systems   Constitutional: Negative.    HENT:  Positive for ear pain.    Eyes: Negative.    Respiratory: Negative.     Cardiovascular: Negative.    Gastrointestinal: Negative.    Endocrine: Negative.    Genitourinary: Negative.    Musculoskeletal: Negative.    Integumentary:  Negative.   Allergic/Immunologic: Negative.    Neurological: Negative.    Hematological: Negative.    Psychiatric/Behavioral: Negative.          Visit Vitals  /82 (BP Location: Left arm, Patient Position: Sitting, BP Method: Large (Manual))   Pulse 107   Ht 4' 11" (1.499 m)   Wt 77.6 kg (171 lb)   SpO2 98%   BMI 34.54 kg/m²       Physical Exam  Vitals and nursing note reviewed.   Constitutional:       Appearance: Normal appearance.   HENT:      Head: Normocephalic.      Nose:      Comments: Nasal passages are edematous with turbinates very swollen , the left is almost occluding the opening of the nare.     Ears : TM both intact , grey, there is slight erythema in the left ear canal, none in the right where she is c/o pain .      Mouth/Throat:      Mouth: Mucous membranes are moist.      Pharynx: Oropharynx is clear.   Eyes:      Conjunctiva/sclera: Conjunctivae normal.   Cardiovascular:      Rate and Rhythm: Normal rate and " regular rhythm.   Pulmonary:      Effort: Pulmonary effort is normal.      Breath sounds: Normal breath sounds.   Abdominal:      General: Bowel sounds are normal.      Palpations: Abdomen is soft.   Musculoskeletal:         General: Normal range of motion.      Cervical back: Normal range of motion.   Skin:     General: Skin is warm and dry.   Neurological:      General: No focal deficit present.      Mental Status: She is alert.   Psychiatric:         Mood and Affect: Mood normal.         Behavior: Behavior normal.          Assessment & Plan:  1. Diabetes mellitus without complication  -     POCT Glucose, Hand-Held Device; Future; Expected date: 01/03/2024     Blood sugars slightly elevated for post prandial reading,. Pt sees Endocrinologist, she said she is taking the Mounjaro but said it is not helping to decrease her appetite or weight.   Follow up with Endo as indicated.     2. Otalgia of both ears  -     Ambulatory referral/consult to ENT; Future; Expected date: 01/10/2024  I have sent in rx for acetic acid for pt to use in both ears, instructed there is not a otitis media infection ,  however she is c./o pain and there is slight erythema to the ear canals , d/c the drops if it causes increasing irritation.     3. Non-seasonal allergic rhinitis, unspecified trigger  -     Ambulatory referral/consult to ENT; Future; Expected date: 01/10/2024  -     azelastine (ASTELIN) 137 mcg (0.1 %) nasal spray; 1 spray (137 mcg total) by Nasal route 2 (two) times daily.  Dispense: 30 mL; Refill: 0    Other orders  -     acetic acid (VOSOL) 2 % otic solution; Place 4 drops into both ears 3 (three) times daily. for 10 days  Dispense: 20 mL; Refill: 0    Future Appointments   Date Time Provider Department Center   1/3/2024  4:00 PM Peyton Hernández NP [facility] [address]   7/3/2024  9:20 AM Antonia Nieves NP [facility]  [address]     .    Lab Frequency Next Occurrence   Ambulatory referral/consult to  Gastroenterology Once 11/15/2023   Ambulatory referral/consult to Psychiatry Once 12/15/2023     Follow up if symptoms worsen or fail to improve.      Peyton Hernández, NP

## 2024-02-01 DIAGNOSIS — N30.10 INTERSTITIAL CYSTITIS: ICD-10-CM

## 2024-02-01 DIAGNOSIS — N32.89 BLADDER SPASMS: ICD-10-CM

## 2024-02-02 RX ORDER — METHENAMINE, SODIUM PHOSPHATE, MONOBASIC, ANHYDROUS, PHENYL SALICYLATE, METHYLENE BLUE AND HYOSCYAMINE SULFATE 118; 40.8; 36; 10; .12 MG/1; MG/1; MG/1; MG/1; MG/1
1 CAPSULE ORAL
Qty: 30 CAPSULE | Refills: 0 | Status: SHIPPED | OUTPATIENT
Start: 2024-02-02 | End: 2024-04-11 | Stop reason: SDUPTHER

## 2024-04-01 DIAGNOSIS — Z00.00 PREVENTATIVE HEALTH CARE: Primary | ICD-10-CM

## 2024-04-11 DIAGNOSIS — N30.10 INTERSTITIAL CYSTITIS: ICD-10-CM

## 2024-04-11 DIAGNOSIS — N32.89 BLADDER SPASMS: ICD-10-CM

## 2024-04-11 RX ORDER — METHENAMINE, SODIUM PHOSPHATE, MONOBASIC, ANHYDROUS, PHENYL SALICYLATE, METHYLENE BLUE AND HYOSCYAMINE SULFATE 118; 40.8; 36; 10; .12 MG/1; MG/1; MG/1; MG/1; MG/1
1 CAPSULE ORAL 3 TIMES DAILY PRN
Qty: 30 CAPSULE | Refills: 0 | Status: SHIPPED | OUTPATIENT
Start: 2024-04-11 | End: 2024-04-25

## 2024-04-11 NOTE — TELEPHONE ENCOUNTER
"----- Message from Juancarlos Cano MA sent at 4/11/2024  2:10 PM CDT -----  Contact: self    ----- Message -----  From: Isis Abebe  Sent: 4/11/2024  11:56 AM CDT  To: Ezequiel [facility]AGNER Staff    Type:  RX Refill Request    Who Called: Rosa Anguiano  Refill or New Rx:new  RX Name and Strength:uribel 118mg  How is the patient currently taking it? (ex. 1XDay):  3x daily  Is this a 30 day or 90 day RX:90  Preferred Pharmacy with phone number:  [facility] - [address] [place] [id number]  Phone: [phone] Fax: [phone]      Local or Mail Order:local  Ordering Provider:priscila powell  Would the patient rather a call back or a response via [facility]?   Best Call Back Number:[phone]  Additional Information: n/a        "

## 2024-04-12 DIAGNOSIS — N30.10 INTERSTITIAL CYSTITIS: ICD-10-CM

## 2024-04-12 DIAGNOSIS — N32.89 BLADDER SPASMS: ICD-10-CM

## 2024-04-12 RX ORDER — METHENAMINE, SODIUM PHOSPHATE, MONOBASIC, ANHYDROUS, PHENYL SALICYLATE, METHYLENE BLUE AND HYOSCYAMINE SULFATE 118; 40.8; 36; 10; .12 MG/1; MG/1; MG/1; MG/1; MG/1
1 CAPSULE ORAL 3 TIMES DAILY PRN
Qty: 30 CAPSULE | Refills: 0 | Status: CANCELLED | OUTPATIENT
Start: 2024-04-12

## 2024-04-12 NOTE — TELEPHONE ENCOUNTER
----- Message from Lamar Davila sent at 4/12/2024  8:20 AM CDT -----  Type:  Pharmacy Calling to Clarify an RX    Name of Caller:Purvi   Pharmacy Name:[facility]   Prescription Name:Uro-Mp  What do they need to clarify?:interaction w/ cyclobenzaprine, it needs a 14 day wash out  Best Call Back Number:[phone]  Additional Information: .    Thank you

## 2024-04-25 ENCOUNTER — OFFICE VISIT (OUTPATIENT)
Dept: PRIMARY CARE CLINIC | Facility: CLINIC | Age: 44
End: 2024-04-25
Payer: COMMERCIAL

## 2024-04-25 VITALS
WEIGHT: 173.81 LBS | BODY MASS INDEX: 35.04 KG/M2 | DIASTOLIC BLOOD PRESSURE: 70 MMHG | SYSTOLIC BLOOD PRESSURE: 130 MMHG | RESPIRATION RATE: 15 BRPM | HEIGHT: 59 IN | HEART RATE: 80 BPM | OXYGEN SATURATION: 99 %

## 2024-04-25 DIAGNOSIS — K64.9 HEMORRHOIDS, UNSPECIFIED HEMORRHOID TYPE: Primary | ICD-10-CM

## 2024-04-25 DIAGNOSIS — K64.4 EXTERNAL HEMORRHOIDS: ICD-10-CM

## 2024-04-25 DIAGNOSIS — K21.9 GASTROESOPHAGEAL REFLUX DISEASE, UNSPECIFIED WHETHER ESOPHAGITIS PRESENT: ICD-10-CM

## 2024-04-25 PROBLEM — M05.79 RHEUMATOID ARTHRITIS INVOLVING MULTIPLE SITES WITH POSITIVE RHEUMATOID FACTOR: Status: RESOLVED | Noted: 2022-06-01 | Resolved: 2024-04-25

## 2024-04-25 PROCEDURE — 3075F SYST BP GE 130 - 139MM HG: CPT | Mod: CPTII,S$GLB,, | Performed by: PHYSICIAN ASSISTANT

## 2024-04-25 PROCEDURE — 99214 OFFICE O/P EST MOD 30 MIN: CPT | Mod: S$GLB,,, | Performed by: PHYSICIAN ASSISTANT

## 2024-04-25 PROCEDURE — 1159F MED LIST DOCD IN RCRD: CPT | Mod: CPTII,S$GLB,, | Performed by: PHYSICIAN ASSISTANT

## 2024-04-25 PROCEDURE — 3008F BODY MASS INDEX DOCD: CPT | Mod: CPTII,S$GLB,, | Performed by: PHYSICIAN ASSISTANT

## 2024-04-25 PROCEDURE — 3078F DIAST BP <80 MM HG: CPT | Mod: CPTII,S$GLB,, | Performed by: PHYSICIAN ASSISTANT

## 2024-04-25 NOTE — PROGRESS NOTES
Subjective:      Patient ID: Rosa Anguiano is a 43 y.o. female.    Chief Complaint: Hemorrhoids      HPI    Needs referral to GI/surgery- for hemorrhoids.     Reports history of hemorrhoids for the past 2 years. Most recently had episodes of painful, rectal bleeding x 1 week. This lasted for approximately 1 week, but symptoms are nearly resolved at this time. She used Preparation H cream and suppositories OTC with relief in symptoms.  Denies symptoms currently.      Review of Systems   Constitutional:  Negative for activity change, appetite change, chills, fatigue and fever.   HENT:  Negative for nasal congestion, facial swelling and rhinorrhea.    Eyes:  Negative for pain and visual disturbance.   Respiratory:  Negative for cough, chest tightness and shortness of breath.    Cardiovascular:  Negative for chest pain, palpitations, leg swelling and claudication.   Gastrointestinal:  Positive for anal bleeding. Negative for abdominal distention, abdominal pain, blood in stool, constipation, diarrhea, nausea, vomiting and reflux.   Endocrine: Negative for polydipsia and polyuria.   Genitourinary:  Negative for difficulty urinating, flank pain and frequency.   Musculoskeletal:  Negative for arthralgias and back pain.   Neurological:  Negative for dizziness, weakness, light-headedness, numbness and headaches.   Psychiatric/Behavioral:  Negative for self-injury, sleep disturbance and suicidal ideas. The patient is not nervous/anxious.         Medication List with Changes/Refills   Current Medications    AZELASTINE (ASTELIN) 137 MCG (0.1 %) NASAL SPRAY    1 spray (137 mcg total) by Nasal route 2 (two) times daily.    BLOOD-GLUCOSE METER,CONTINUOUS (DEXCOM G6 ) MISC    Use as instructed    BLOOD-GLUCOSE SENSOR (DEXCOM G6 SENSOR) MARIELENA    Use as instructed    BLOOD-GLUCOSE TRANSMITTER (DEXCOM G6 TRANSMITTER) MARIELENA    Use as instructed    BORIC ACID (PH-D) 600 MG VAGINAL SUPPOSITORY    Place 1 suppository (600 mg total)  "vaginally every evening. As directed    CLOTRIMAZOLE-BETAMETHASONE 1-0.05% (LOTRISONE) CREAM    Apply topically 2 (two) times daily.    ESTRADIOL (ESTRACE) 0.01 % (0.1 MG/GRAM) VAGINAL CREAM    Place 1 g vaginally every Mon, Wed, Fri.    FAMOTIDINE (PEPCID) 40 MG TABLET    Take 40 mg by mouth once daily.    FOLIC ACID ORAL    Take by mouth.    HYDROCORTISONE (ANUSOL-HC) 25 MG SUPPOSITORY    Place 25 mg rectally as needed.    METHOCARBAMOL (ROBAXIN) 500 MG TAB    Take 1 tablet (500 mg total) by mouth 4 (four) times daily as needed (spasms).    METHOTREXATE 2.5 MG TAB    Take 2.5 mg by mouth every 7 days.    NYSTATIN (MYCOSTATIN) CREAM    Apply topically 2 (two) times daily.    ONDANSETRON (ZOFRAN-ODT) 4 MG TBDL    Take 4 mg by mouth every 6 (six) hours as needed.    PANTOPRAZOLE (PROTONIX) 40 MG TABLET        PREGABALIN (LYRICA) 150 MG CAPSULE    Take 1 capsule (150 mg total) by mouth 2 (two) times daily.    PROCTOZONE-HC 2.5 % RECTAL CREAM    Place 30 g rectally as needed.   Discontinued Medications    HYDROXYZINE HCL (ATARAX) 25 MG TABLET        MELOXICAM (MOBIC) 15 MG TABLET    Take 15 mg by mouth once daily.    METHEN-M.BLUE-S.PHOS-PHSAL-HYO (URO-MP) 118-10-40.8-36 MG CAP    Take 1 capsule by mouth 3 (three) times daily as needed. for bladder pain        Review of patient's allergies indicates:   Allergen Reactions    Nabumetone Other (See Comments)     Diffculty swallowing    Dulaglutide Nausea And Vomiting     Severe diarrhea       Objective:     Vitals:    04/25/24 1007   BP: 130/70   Pulse: 80   Resp: 15   SpO2: 99%   Weight: 78.8 kg (173 lb 12.8 oz)   Height: 4' 11" (1.499 m)        Physical Exam  Constitutional:       Appearance: Normal appearance.   HENT:      Head: Normocephalic and atraumatic.   Eyes:      Extraocular Movements: Extraocular movements intact.      Conjunctiva/sclera: Conjunctivae normal.      Pupils: Pupils are equal, round, and reactive to light.   Cardiovascular:      Rate and Rhythm: " Normal rate and regular rhythm.      Pulses: Normal pulses.   Pulmonary:      Effort: Pulmonary effort is normal.      Breath sounds: Normal breath sounds.   Genitourinary:     Rectum: Anal fissure and external hemorrhoid present. No tenderness.      Comments: Non-thrombosed external hemorrhoid present to 5 o'clock position of anus. No anal fissures or tears.   Musculoskeletal:      Cervical back: Normal range of motion and neck supple.   Skin:     General: Skin is warm and dry.   Neurological:      General: No focal deficit present.      Mental Status: She is alert and oriented to person, place, and time.   Psychiatric:         Mood and Affect: Mood normal.         Behavior: Behavior normal.            Assessment & Plan:     Hemorrhoids, unspecified hemorrhoid type  Comments:  Referral to Dr. Tellez.  Can use Proctozone p.r.n.  Orders:  -     Ambulatory referral/consult to Gastroenterology; Future; Expected date: 05/02/2024         -Patient instructed that our office calls back on all labs and imaging within 1 week of receiving the results. Patient instructed to reach out to our office if they have not heard from us so that we can request the results from the lab/imaging center      Elena Riley PA-C    Disclaimer: This note may have been prepared using voice recognition software, it may have not been extensively proofed, as such there could be errors within the text such as sound alike errors.

## 2024-05-07 RX ORDER — FAMOTIDINE 40 MG/1
TABLET, FILM COATED ORAL
Qty: 30 TABLET | Refills: 4 | Status: SHIPPED | OUTPATIENT
Start: 2024-05-07

## 2024-05-17 ENCOUNTER — PATIENT MESSAGE (OUTPATIENT)
Dept: PRIMARY CARE CLINIC | Facility: CLINIC | Age: 44
End: 2024-05-17
Payer: COMMERCIAL

## 2024-05-20 ENCOUNTER — OFFICE VISIT (OUTPATIENT)
Dept: FAMILY MEDICINE | Facility: CLINIC | Age: 44
End: 2024-05-20
Payer: COMMERCIAL

## 2024-05-20 VITALS
BODY MASS INDEX: 34.55 KG/M2 | DIASTOLIC BLOOD PRESSURE: 70 MMHG | RESPIRATION RATE: 15 BRPM | HEIGHT: 59 IN | HEART RATE: 78 BPM | WEIGHT: 171.38 LBS | OXYGEN SATURATION: 99 % | SYSTOLIC BLOOD PRESSURE: 128 MMHG

## 2024-05-20 DIAGNOSIS — R30.0 DYSURIA: ICD-10-CM

## 2024-05-20 DIAGNOSIS — N63.25 MASS OVERLAPPING MULTIPLE QUADRANTS OF LEFT BREAST: ICD-10-CM

## 2024-05-20 DIAGNOSIS — N39.0 URINARY TRACT INFECTION WITHOUT HEMATURIA, SITE UNSPECIFIED: Primary | ICD-10-CM

## 2024-05-20 PROCEDURE — 3008F BODY MASS INDEX DOCD: CPT | Mod: CPTII,S$GLB,, | Performed by: PHYSICIAN ASSISTANT

## 2024-05-20 PROCEDURE — 99214 OFFICE O/P EST MOD 30 MIN: CPT | Mod: S$GLB,,, | Performed by: PHYSICIAN ASSISTANT

## 2024-05-20 PROCEDURE — 3078F DIAST BP <80 MM HG: CPT | Mod: CPTII,S$GLB,, | Performed by: PHYSICIAN ASSISTANT

## 2024-05-20 PROCEDURE — 3074F SYST BP LT 130 MM HG: CPT | Mod: CPTII,S$GLB,, | Performed by: PHYSICIAN ASSISTANT

## 2024-05-20 PROCEDURE — 1159F MED LIST DOCD IN RCRD: CPT | Mod: CPTII,S$GLB,, | Performed by: PHYSICIAN ASSISTANT

## 2024-05-20 RX ORDER — NITROFURANTOIN 25; 75 MG/1; MG/1
100 CAPSULE ORAL 2 TIMES DAILY
Qty: 14 CAPSULE | Refills: 0 | Status: SHIPPED | OUTPATIENT
Start: 2024-05-20 | End: 2024-05-27

## 2024-05-20 NOTE — PROGRESS NOTES
Subjective:      Patient ID: Rosa Anguiano is a 43 y.o. female.    Chief Complaint: Follow-up (PAINFUL URINATION/KNOT IN L BREAST - ONSET A FEW DAYS AGO)      HPI  Pt is here today with new complaints :     Left breast pain and nodularity x 1 wk.  Last mammo 2023    Dysuria and freq x 3 days   Review of Systems   Constitutional:  Negative for activity change, appetite change, chills, diaphoresis, fatigue and fever.   Respiratory:  Negative for cough, choking, chest tightness and shortness of breath.    Cardiovascular:  Negative for chest pain and leg swelling.   Gastrointestinal:  Negative for abdominal distention, abdominal pain, blood in stool, constipation, diarrhea, nausea, vomiting and reflux.   Genitourinary:  Positive for dysuria, frequency and urgency. Negative for bladder incontinence, decreased urine volume, difficulty urinating, enuresis, flank pain, hematuria, nocturia, pelvic pain, vaginal bleeding, vaginal discharge, vaginal pain and vaginal dryness.   Musculoskeletal:  Negative for arthralgias, back pain, leg pain and myalgias.   Integumentary:  Positive for breast mass and breast tenderness.   Breast: Positive for mass and tenderness.      Medication List with Changes/Refills   New Medications    SELAM-M.BLUE-S.PHOS-PHSAL-HYO (URIBEL) 118-10-40.8-36 MG CAP    Take 1 capsule by mouth 4 (four) times daily. for 3 days    NITROFURANTOIN, MACROCRYSTAL-MONOHYDRATE, (MACROBID) 100 MG CAPSULE    Take 1 capsule (100 mg total) by mouth 2 (two) times daily. for 7 days   Current Medications    AZELASTINE (ASTELIN) 137 MCG (0.1 %) NASAL SPRAY    1 spray (137 mcg total) by Nasal route 2 (two) times daily.    BLOOD-GLUCOSE METER,CONTINUOUS (DEXCOM G6 ) MISC    Use as instructed    BLOOD-GLUCOSE SENSOR (DEXCOM G6 SENSOR) MARIELENA    Use as instructed    BLOOD-GLUCOSE TRANSMITTER (DEXCOM G6 TRANSMITTER) MARIELENA    Use as instructed    BORIC ACID (PH-D) 600 MG VAGINAL SUPPOSITORY    Place 1 suppository (600 mg total)  "vaginally every evening. As directed    CLOTRIMAZOLE-BETAMETHASONE 1-0.05% (LOTRISONE) CREAM    Apply topically 2 (two) times daily.    ESTRADIOL (ESTRACE) 0.01 % (0.1 MG/GRAM) VAGINAL CREAM    Place 1 g vaginally every Mon, Wed, Fri.    FAMOTIDINE (PEPCID) 40 MG TABLET    TAKE 1 TABLET BY MOUTH ONCE A DAY AS NEEDED FOR HEARTBURN    FOLIC ACID ORAL    Take by mouth.    HYDROCORTISONE (ANUSOL-HC) 25 MG SUPPOSITORY    Place 25 mg rectally as needed.    METHOCARBAMOL (ROBAXIN) 500 MG TAB    Take 1 tablet (500 mg total) by mouth 4 (four) times daily as needed (spasms).    METHOTREXATE 2.5 MG TAB    Take 2.5 mg by mouth every 7 days.    NYSTATIN (MYCOSTATIN) CREAM    Apply topically 2 (two) times daily.    ONDANSETRON (ZOFRAN-ODT) 4 MG TBDL    Take 4 mg by mouth every 6 (six) hours as needed.    PANTOPRAZOLE (PROTONIX) 40 MG TABLET        PREGABALIN (LYRICA) 150 MG CAPSULE    Take 1 capsule (150 mg total) by mouth 2 (two) times daily.    PROCTOZONE-HC 2.5 % RECTAL CREAM    Place 30 g rectally as needed.        Objective:     Vitals:    05/20/24 1042   BP: 128/70   Pulse: 78   Resp: 15   SpO2: 99%   Weight: 77.7 kg (171 lb 6.4 oz)   Height: 4' 11" (1.499 m)        Physical Exam  Exam conducted with a chaperone present (MARY Simpson MA).   Constitutional:       General: She is not in acute distress.     Appearance: Normal appearance. She is normal weight. She is not ill-appearing, toxic-appearing or diaphoretic.   HENT:      Head: Normocephalic and atraumatic.      Right Ear: External ear normal.      Left Ear: External ear normal.      Nose: Nose normal.   Eyes:      Conjunctiva/sclera: Conjunctivae normal.      Pupils: Pupils are equal, round, and reactive to light.   Cardiovascular:      Rate and Rhythm: Normal rate and regular rhythm.      Pulses: Normal pulses.      Heart sounds: Normal heart sounds. No murmur heard.     No friction rub. No gallop.   Pulmonary:      Effort: Pulmonary effort is normal.      Breath sounds: " Normal breath sounds.   Chest:          Comments: Nodularity and tenderness as marked in image   Abdominal:      General: Abdomen is flat. Bowel sounds are normal. There is no distension.      Palpations: Abdomen is soft. There is no mass.      Tenderness: There is no abdominal tenderness. There is no right CVA tenderness, left CVA tenderness, guarding or rebound.      Hernia: No hernia is present.   Musculoskeletal:      Cervical back: Normal range of motion and neck supple.   Skin:     General: Skin is warm and dry.      Capillary Refill: Capillary refill takes less than 2 seconds.   Neurological:      Mental Status: She is alert and oriented to person, place, and time.   Psychiatric:         Mood and Affect: Mood normal.         Behavior: Behavior normal.            Assessment & Plan:     Urinary tract infection without hematuria, site unspecified  -     POCT Urinalysis(Instrument)  -     nitrofurantoin, macrocrystal-monohydrate, (MACROBID) 100 MG capsule; Take 1 capsule (100 mg total) by mouth 2 (two) times daily. for 7 days  Dispense: 14 capsule; Refill: 0  -     methen-m.blue-s.phos-phsal-hyo (URIBEL) 118-10-40.8-36 mg Cap; Take 1 capsule by mouth 4 (four) times daily. for 3 days  Dispense: 12 capsule; Refill: 0    Mass overlapping multiple quadrants of left breast  -     Mammo Digital Diagnostic Left; Future; Expected date: 05/20/2024  -     US Breast Left Complete; Future; Expected date: 05/20/2024    Dysuria       Return to clinic if not improving or worsening at anytime   Pt counseled on the importance of her imaging       -Patient instructed that our office calls back on all labs and imaging within 1 week of receiving the results. Patient instructed to reach out to our office if they have not heard from us so that we can request the results from the lab/imaging center           Tameka Dyson PA-C

## 2024-06-04 LAB — HBA1C MFR BLD: 6.7 % (ref 4–6)

## 2024-06-12 ENCOUNTER — PATIENT OUTREACH (OUTPATIENT)
Dept: ADMINISTRATIVE | Facility: HOSPITAL | Age: 44
End: 2024-06-12
Payer: COMMERCIAL

## 2024-06-19 DIAGNOSIS — N63.25 BREAST LUMP ON LEFT SIDE AT 3 O'CLOCK POSITION: Primary | ICD-10-CM

## 2024-06-19 DIAGNOSIS — N63.15 MASS OVERLAPPING MULTIPLE QUADRANTS OF RIGHT BREAST: Primary | ICD-10-CM

## 2024-06-19 DIAGNOSIS — N63.25 MASS OVERLAPPING MULTIPLE QUADRANTS OF LEFT BREAST: ICD-10-CM

## 2024-06-25 ENCOUNTER — PATIENT MESSAGE (OUTPATIENT)
Dept: PRIMARY CARE CLINIC | Facility: CLINIC | Age: 44
End: 2024-06-25
Payer: MEDICAID

## 2024-06-25 ENCOUNTER — TELEPHONE (OUTPATIENT)
Dept: PRIMARY CARE CLINIC | Facility: CLINIC | Age: 44
End: 2024-06-25
Payer: COMMERCIAL

## 2024-06-25 NOTE — TELEPHONE ENCOUNTER
----- Message from Selam Quintero LPN sent at 6/24/2024  2:31 PM CDT -----  Regarding: FW: Orders  Contact: patient    ----- Message -----  From: Nichole Tolentino  Sent: 6/24/2024   2:15 PM CDT  To: Eduardo Lieberman ([facility]) Staff  Subject: Orders                                           Per phone call with patient, she stated that she would like a call back regarding her rheumatologist saw something in her blood like she was taking addrall and she do not take that medication. The caller is requesting for Lab work to be done so she can continue to get her Lyrica.  Please return call at [phone] (home).    Thanks,  SJ

## 2024-07-02 ENCOUNTER — PATIENT OUTREACH (OUTPATIENT)
Dept: ADMINISTRATIVE | Facility: HOSPITAL | Age: 44
End: 2024-07-02
Payer: MEDICAID

## 2024-07-03 ENCOUNTER — OFFICE VISIT (OUTPATIENT)
Dept: UROLOGY | Facility: CLINIC | Age: 44
End: 2024-07-03
Payer: MEDICAID

## 2024-07-03 VITALS
SYSTOLIC BLOOD PRESSURE: 123 MMHG | HEIGHT: 59 IN | HEART RATE: 74 BPM | OXYGEN SATURATION: 99 % | WEIGHT: 178 LBS | DIASTOLIC BLOOD PRESSURE: 77 MMHG | BODY MASS INDEX: 35.88 KG/M2

## 2024-07-03 DIAGNOSIS — R82.90 ABNORMAL URINALYSIS: ICD-10-CM

## 2024-07-03 DIAGNOSIS — N30.10 INTERSTITIAL CYSTITIS: ICD-10-CM

## 2024-07-03 DIAGNOSIS — N39.3 STRESS INCONTINENCE: Primary | ICD-10-CM

## 2024-07-03 DIAGNOSIS — N32.89 BLADDER SPASMS: ICD-10-CM

## 2024-07-03 LAB
BILIRUBIN, UA POC OHS: NEGATIVE
BLOOD, UA POC OHS: ABNORMAL
CLARITY, UA POC OHS: CLEAR
COLOR, UA POC OHS: YELLOW
GLUCOSE, UA POC OHS: NEGATIVE
KETONES, UA POC OHS: NEGATIVE
LEUKOCYTES, UA POC OHS: NEGATIVE
NITRITE, UA POC OHS: NEGATIVE
PH, UA POC OHS: 5.5
PROTEIN, UA POC OHS: NEGATIVE
SPECIFIC GRAVITY, UA POC OHS: <=1.005
UROBILINOGEN, UA POC OHS: 0.2

## 2024-07-03 PROCEDURE — 3078F DIAST BP <80 MM HG: CPT | Mod: CPTII,S$GLB,, | Performed by: FAMILY MEDICINE

## 2024-07-03 PROCEDURE — 3074F SYST BP LT 130 MM HG: CPT | Mod: CPTII,S$GLB,, | Performed by: FAMILY MEDICINE

## 2024-07-03 PROCEDURE — 99214 OFFICE O/P EST MOD 30 MIN: CPT | Mod: S$GLB,,, | Performed by: FAMILY MEDICINE

## 2024-07-03 PROCEDURE — 3008F BODY MASS INDEX DOCD: CPT | Mod: CPTII,S$GLB,, | Performed by: FAMILY MEDICINE

## 2024-07-03 PROCEDURE — 3044F HG A1C LEVEL LT 7.0%: CPT | Mod: CPTII,S$GLB,, | Performed by: FAMILY MEDICINE

## 2024-07-03 PROCEDURE — 81003 URINALYSIS AUTO W/O SCOPE: CPT | Mod: QW,S$GLB,, | Performed by: FAMILY MEDICINE

## 2024-07-03 PROCEDURE — 1159F MED LIST DOCD IN RCRD: CPT | Mod: CPTII,S$GLB,, | Performed by: FAMILY MEDICINE

## 2024-07-03 RX ORDER — METHOCARBAMOL 500 MG/1
500 TABLET, FILM COATED ORAL 4 TIMES DAILY PRN
Qty: 40 TABLET | Refills: 3 | Status: SHIPPED | OUTPATIENT
Start: 2024-07-03

## 2024-07-03 NOTE — PROGRESS NOTES
Subjective:       Patient ID: Rosa Anguiano is a 43 y.o. female.    Chief Complaint: No chief complaint on file.      HPI: 43-year-old female patient presenting to the clinic for six-month follow up.  Patient has a history of stress incontinence.  The patient has had cystoscopy in the past recommending TOT however the patient declined due to rheumatoid arthritis.  She also has a history of interstitial cystitis for which she underwent cysto hydrodistention in the past.  She denies any issues at this time.  She also takes Uribel, Robaxin, D mannose and aloe vera.  She denies any dysuria or discomfort today.         Past Medical History:   Past Medical History:   Diagnosis Date    Acute pelvic pain, female     Breast pain     Cystitis     Diabetes mellitus     Dyspareunia, female     Endometriosis     Fibromyalgia     GERD (gastroesophageal reflux disease)     IBS (irritable bowel syndrome)     Interstitial cystitis     Irregular menstrual cycle     Leukorrhea     Osteoarthritis     Ovarian cyst     Pneumonia     YAMILET (stress urinary incontinence, female)     Vaginal yeast infection     Vulvitis        Past Surgical Historical:   Past Surgical History:   Procedure Laterality Date     SECTION      CHOLECYSTECTOMY      LAPAROSCOPY-ASSISTED VAGINAL HYSTERECTOMY      TUBAL LIGATION          Medications:   Medication List with Changes/Refills   Current Medications    AZELASTINE (ASTELIN) 137 MCG (0.1 %) NASAL SPRAY    1 spray (137 mcg total) by Nasal route 2 (two) times daily.    BLOOD-GLUCOSE METER,CONTINUOUS (DEXCOM G6 ) MISC    Use as instructed    BLOOD-GLUCOSE SENSOR (DEXCOM G6 SENSOR) MARIELENA    Use as instructed    BLOOD-GLUCOSE TRANSMITTER (DEXCOM G6 TRANSMITTER) MARIELENA    Use as instructed    BORIC ACID (PH-D) 600 MG VAGINAL SUPPOSITORY    Place 1 suppository (600 mg total) vaginally every evening. As directed    CLOTRIMAZOLE-BETAMETHASONE 1-0.05% (LOTRISONE) CREAM    Apply topically 2 (two) times daily.     ESTRADIOL (ESTRACE) 0.01 % (0.1 MG/GRAM) VAGINAL CREAM    Place 1 g vaginally every Mon, Wed, Fri.    FAMOTIDINE (PEPCID) 40 MG TABLET    TAKE 1 TABLET BY MOUTH ONCE A DAY AS NEEDED FOR HEARTBURN    FOLIC ACID ORAL    Take by mouth.    HYDROCORTISONE (ANUSOL-HC) 25 MG SUPPOSITORY    Place 25 mg rectally as needed.    METHOCARBAMOL (ROBAXIN) 500 MG TAB    Take 1 tablet (500 mg total) by mouth 4 (four) times daily as needed (spasms).    METHOTREXATE 2.5 MG TAB    Take 2.5 mg by mouth every 7 days.    NYSTATIN (MYCOSTATIN) CREAM    Apply topically 2 (two) times daily.    ONDANSETRON (ZOFRAN-ODT) 4 MG TBDL    Take 4 mg by mouth every 6 (six) hours as needed.    PANTOPRAZOLE (PROTONIX) 40 MG TABLET        PREGABALIN (LYRICA) 150 MG CAPSULE    Take 1 capsule (150 mg total) by mouth 2 (two) times daily.    PROCTOZONE-HC 2.5 % RECTAL CREAM    Place 30 g rectally as needed.    UNABLE TO FIND    medication name: magnesium        Past Social History:   Social History     Socioeconomic History    Marital status:    Tobacco Use    Smoking status: Never    Smokeless tobacco: Never   Substance and Sexual Activity    Alcohol use: Not Currently    Drug use: Never    Sexual activity: Yes     Partners: Male     Birth control/protection: See Surgical Hx     Comment: Hysterectomy       Allergies:   Review of patient's allergies indicates:   Allergen Reactions    Nabumetone Other (See Comments)     Diffculty swallowing    Dulaglutide Nausea And Vomiting     Severe diarrhea        Family History:   Family History   Problem Relation Name Age of Onset    Colon cancer Father  60    Prostate cancer Father  60    Breast cancer Sister  66    Ovarian cancer Neg Hx      Uterine cancer Neg Hx      Melanoma Neg Hx      Pancreatic cancer Neg Hx          Review of Systems:  Review of Systems   Constitutional:  Negative for activity change, appetite change, chills, diaphoresis, fatigue, fever and unexpected weight change.   HENT:  Negative  for congestion, dental problem, drooling, ear discharge, ear pain, facial swelling, hearing loss, mouth sores, nosebleeds, postnasal drip, rhinorrhea, sinus pressure, sinus pain, sneezing, sore throat, tinnitus, trouble swallowing and voice change.    Eyes:  Negative for photophobia, pain, discharge, redness, itching and visual disturbance.   Respiratory:  Negative for apnea, cough, choking, chest tightness, shortness of breath, wheezing and stridor.    Cardiovascular:  Negative for chest pain and leg swelling.   Gastrointestinal:  Negative for abdominal distention, abdominal pain, anal bleeding, blood in stool, constipation, diarrhea, nausea, rectal pain and vomiting.   Endocrine: Negative for cold intolerance, heat intolerance, polydipsia, polyphagia and polyuria.   Genitourinary:  Positive for frequency and urgency. Negative for decreased urine volume, difficulty urinating, dyspareunia, dysuria, enuresis, flank pain, genital sores, hematuria, menstrual problem, pelvic pain, vaginal bleeding, vaginal discharge and vaginal pain.   Musculoskeletal:  Negative for arthralgias, back pain, gait problem, joint swelling, myalgias, neck pain and neck stiffness.   Skin:  Negative for color change, pallor, rash and wound.   Allergic/Immunologic: Negative for environmental allergies, food allergies and immunocompromised state.   Neurological:  Negative for dizziness, tremors, seizures, syncope, facial asymmetry, speech difficulty, weakness, light-headedness, numbness and headaches.   Hematological:  Negative for adenopathy. Does not bruise/bleed easily.   Psychiatric/Behavioral:  Negative for agitation, behavioral problems, confusion, decreased concentration, dysphoric mood, hallucinations, self-injury, sleep disturbance and suicidal ideas. The patient is not nervous/anxious and is not hyperactive.        Physical Exam:  Physical Exam  Exam conducted with a chaperone present.   Constitutional:       Appearance: Normal  appearance.   HENT:      Head: Normocephalic.      Nose: Nose normal.      Mouth/Throat:      Mouth: Mucous membranes are moist.      Pharynx: Oropharynx is clear.   Eyes:      Extraocular Movements: Extraocular movements intact.      Conjunctiva/sclera: Conjunctivae normal.      Pupils: Pupils are equal, round, and reactive to light.   Cardiovascular:      Rate and Rhythm: Normal rate and regular rhythm.      Pulses: Normal pulses.      Heart sounds: Normal heart sounds.   Pulmonary:      Effort: Pulmonary effort is normal.      Breath sounds: Normal breath sounds.   Abdominal:      General: Abdomen is flat. Bowel sounds are normal.      Palpations: Abdomen is soft.      Hernia: There is no hernia in the left inguinal area or right inguinal area.   Genitourinary:     General: Normal vulva.      Pubic Area: No rash or pubic lice.       Labia:         Right: No rash, tenderness, lesion or injury.         Left: No rash, tenderness, lesion or injury.       Urethra: No prolapse, urethral pain, urethral swelling or urethral lesion.      Rectum: Normal.   Musculoskeletal:         General: Normal range of motion.      Cervical back: Normal range of motion and neck supple.   Lymphadenopathy:      Lower Body: No right inguinal adenopathy. No left inguinal adenopathy.   Skin:     General: Skin is warm and dry.      Capillary Refill: Capillary refill takes less than 2 seconds.   Neurological:      General: No focal deficit present.      Mental Status: She is alert and oriented to person, place, and time. Mental status is at baseline.   Psychiatric:         Mood and Affect: Mood normal.         Behavior: Behavior normal.         Thought Content: Thought content normal.         Judgment: Judgment normal.         Assessment/Plan:     Stress incontinence:  Discussed cystoscopy for evaluation and TOT however the patient would like to avoid surgical intervention at this time.  I did recommend pelvic floor therapy and the patient  would like to think about moving forward with this intervention and notify our clinic.  If she would like to proceed we can send referral to [facility] physical therapy.  If the patient is unable to attend physical therapy I encouraged her to diligently practice Kegel exercises and timed voiding to reduce urinary leakage. PVR 1 ml    Interstitial cystitis: Refill the patient's medications at this time.  Avoid dietary triggers.  The patient has had cysto hydrodistention in the past.    Follow up in 1 year.  Complete urine drop-off if symptoms of infection or interstitial cystitis develop.  Problem List Items Addressed This Visit    None  Visit Diagnoses       Abnormal urinalysis        Relevant Orders    POCT Urinalysis(Instrument) (Completed)    Stress incontinence        Relevant Orders    POCT Bladder Scan

## 2024-07-30 ENCOUNTER — OFFICE VISIT (OUTPATIENT)
Dept: FAMILY MEDICINE | Facility: CLINIC | Age: 44
End: 2024-07-30
Payer: COMMERCIAL

## 2024-07-30 VITALS
DIASTOLIC BLOOD PRESSURE: 84 MMHG | OXYGEN SATURATION: 96 % | HEART RATE: 75 BPM | BODY MASS INDEX: 34.68 KG/M2 | WEIGHT: 172 LBS | HEIGHT: 59 IN | SYSTOLIC BLOOD PRESSURE: 122 MMHG

## 2024-07-30 DIAGNOSIS — H66.93 BILATERAL OTITIS MEDIA, UNSPECIFIED OTITIS MEDIA TYPE: Primary | ICD-10-CM

## 2024-07-30 PROCEDURE — 1159F MED LIST DOCD IN RCRD: CPT | Mod: CPTII,S$GLB,, | Performed by: NURSE PRACTITIONER

## 2024-07-30 PROCEDURE — 99214 OFFICE O/P EST MOD 30 MIN: CPT | Mod: S$GLB,,, | Performed by: NURSE PRACTITIONER

## 2024-07-30 PROCEDURE — 1160F RVW MEDS BY RX/DR IN RCRD: CPT | Mod: CPTII,S$GLB,, | Performed by: NURSE PRACTITIONER

## 2024-07-30 PROCEDURE — 3008F BODY MASS INDEX DOCD: CPT | Mod: CPTII,S$GLB,, | Performed by: NURSE PRACTITIONER

## 2024-07-30 PROCEDURE — 3044F HG A1C LEVEL LT 7.0%: CPT | Mod: CPTII,S$GLB,, | Performed by: NURSE PRACTITIONER

## 2024-07-30 PROCEDURE — 3079F DIAST BP 80-89 MM HG: CPT | Mod: CPTII,S$GLB,, | Performed by: NURSE PRACTITIONER

## 2024-07-30 PROCEDURE — 3074F SYST BP LT 130 MM HG: CPT | Mod: CPTII,S$GLB,, | Performed by: NURSE PRACTITIONER

## 2024-07-30 RX ORDER — PANTOPRAZOLE SODIUM 40 MG/1
40 TABLET, DELAYED RELEASE ORAL DAILY
COMMUNITY

## 2024-07-30 RX ORDER — INSULIN GLARGINE 100 [IU]/ML
19 INJECTION, SOLUTION SUBCUTANEOUS DAILY
COMMUNITY

## 2024-07-30 RX ORDER — SUCRALFATE 1 G/1
1 TABLET ORAL
COMMUNITY

## 2024-07-30 RX ORDER — ONDANSETRON 4 MG/1
4 TABLET, FILM COATED ORAL EVERY 6 HOURS PRN
COMMUNITY
Start: 2024-06-03

## 2024-07-30 RX ORDER — FLUCONAZOLE 150 MG/1
150 TABLET ORAL WEEKLY
COMMUNITY

## 2024-07-30 RX ORDER — AMOXICILLIN 875 MG/1
875 TABLET, FILM COATED ORAL EVERY 12 HOURS
Qty: 14 TABLET | Refills: 0 | Status: SHIPPED | OUTPATIENT
Start: 2024-07-30

## 2024-07-30 RX ORDER — MONTELUKAST SODIUM 10 MG/1
10 TABLET ORAL DAILY
COMMUNITY
Start: 2024-06-03

## 2024-07-30 RX ORDER — TIRZEPATIDE 2.5 MG/.5ML
2.5 INJECTION, SOLUTION SUBCUTANEOUS
COMMUNITY

## 2024-07-30 NOTE — PROGRESS NOTES
"Patient ID: Rosa Anguiano  MRN: [id number]      History of Present Illness:  The patient is a 43 y.o. Other female who presents to clinic for evaluation and management with a chief complaint of Headache (Sinus pressure behind eyes and ear hurting )     She reports her sx started about 3 days ago and she has had a "constant sinus headache" behind eyes and bilateral ear pain . She said she feels very "stuffy " in her nose and otc treatments have not been effective.     Allergies: Patient is allergic to nabumetone and dulaglutide.     Smoking status:  Social History     Tobacco Use   Smoking Status Never   Smokeless Tobacco Never       Problem List:  Patient Active Problem List   Diagnosis    Fibromyalgia    Class 1 obesity due to excess calories with serious comorbidity and body mass index (BMI) of 34.0 to 34.9 in adult    Type 2 diabetes mellitus, without long-term current use of insulin    GERD (gastroesophageal reflux disease)    Muscle spasm        Current Medications:  Current Outpatient Medications   Medication Instructions    amoxicillin (AMOXIL) 875 mg, Oral, Every 12 hours    azelastine (ASTELIN) 137 mcg, Nasal, 2 times daily    blood-glucose meter,continuous (DEXCOM G6 ) Misc Use as instructed    blood-glucose sensor (DEXCOM G6 SENSOR) Kath Use as instructed    blood-glucose transmitter (DEXCOM G6 TRANSMITTER) Kath Use as instructed    boric acid (BORIC ACID) 650 mg, Vaginal, Every 7 days    boric acid (PH-D) 600 mg, Vaginal, Nightly, As directed    CARAFATE 1 g, Oral, Before meals & nightly    clotrimazole-betamethasone 1-0.05% (LOTRISONE) cream Topical (Top), 2 times daily    estradioL (ESTRACE) 1 g, Vaginal, Every Mon, Wed, Fri    famotidine (PEPCID) 40 MG tablet TAKE 1 TABLET BY MOUTH ONCE A DAY AS NEEDED FOR HEARTBURN    fluconazole (DIFLUCAN) 150 mg, Oral, Weekly    FOLIC ACID ORAL Oral    hydrocortisone (ANUSOL-HC) 25 mg, Rectal, As needed (PRN)    LANTUS SOLOSTAR U-100 INSULIN 19 Units, " "Subcutaneous, Daily    methen-m.blue-s.phos-phsal-hyo (URIBEL) 118-10-40.8-36 mg Cap 1 capsule, Oral, 3 times daily PRN    methocarbamoL (ROBAXIN) 500 mg, Oral, 4 times daily PRN    methotrexate 2.5 mg, Oral, Every 7 days    montelukast (SINGULAIR) 10 mg, Oral, Daily    MOUNJARO 2.5 mg, Subcutaneous, Every 7 days    nystatin (MYCOSTATIN) cream Topical (Top), 2 times daily    ondansetron (ZOFRAN) 4 mg, Oral, Every 6 hours PRN    ondansetron (ZOFRAN-ODT) 4 mg, Oral, Every 6 hours PRN    pantoprazole (PROTONIX) 40 mg, Oral, Daily    pregabalin (LYRICA) 150 mg, Oral, 2 times daily    PROCTOZONE-HC 30 g, Rectal, As needed (PRN)    UNABLE TO FIND medication name: magnesium             Review of Systems   Constitutional: Negative.    HENT:  Positive for nasal congestion, ear pain, sinus pressure/congestion and sore throat.    Eyes: Negative.    Respiratory: Negative.     Cardiovascular: Negative.    Gastrointestinal: Negative.    Endocrine: Negative.    Genitourinary: Negative.    Musculoskeletal: Negative.    Integumentary:  Negative.   Allergic/Immunologic: Negative.    Neurological: Negative.    Hematological: Negative.    Psychiatric/Behavioral: Negative.          Visit Vitals  /84 (BP Location: Left arm, Patient Position: Sitting, BP Method: Medium (Manual))   Pulse 75   Ht 4' 11" (1.499 m)   Wt 78 kg (172 lb)   SpO2 96%   BMI 34.74 kg/m²       Physical Exam  Constitutional:       Comments: Eyes appear puffy and swollen, dark circles beneath eyes    HENT:      Head: Normocephalic.      Ears:      Comments: Bilateral ears, canal is erythematous as is TM    Oropharynx + for erythema, no exudates noted.     Anterior cervical adenopathy noted  Eyes:      Conjunctiva/sclera: Conjunctivae normal.   Cardiovascular:      Rate and Rhythm: Normal rate and regular rhythm.   Pulmonary:      Effort: Pulmonary effort is normal.      Breath sounds: Normal breath sounds.   Abdominal:      Palpations: Abdomen is soft. "   Musculoskeletal:         General: Normal range of motion.   Lymphadenopathy:      Cervical: Cervical adenopathy present.   Skin:     General: Skin is warm and dry.   Neurological:      General: No focal deficit present.      Mental Status: She is oriented to person, place, and time.   Psychiatric:         Mood and Affect: Mood normal.          Assessment & Plan:  1. Bilateral otitis media, unspecified otitis media type    Other orders  -     amoxicillin (AMOXIL) 875 MG tablet; Take 1 tablet (875 mg total) by mouth every 12 (twelve) hours.  Dispense: 14 tablet; Refill: 0     May alternate tylenol and ibuprofen for pain relief, warm compresses over face, steam , ice packs to eyes. Normal saline irrigation to loosen secretions/congestion     Future Appointments   Date Time Provider Department Center   7/7/2025  9:20 AM Antonia Nieves NP [facility] UROLOGY  [place]       Lab Frequency Next Occurrence   Ambulatory referral/consult to Psychiatry Once 12/15/2023   Ambulatory referral/consult to ENT Once 01/10/2024   Ambulatory referral/consult to Gastroenterology Once 05/02/2024   Ambulatory referral/consult to Gastroenterology Once 05/02/2024   Mammo Digital Diagnostic Bilat Once 06/19/2024   Mammo Digital Diagnostic Bilat with [facility] Once 06/19/2024       Follow up if symptoms worsen or fail to improve.      Peyotn Hernández NP

## 2024-08-14 ENCOUNTER — OFFICE VISIT (OUTPATIENT)
Dept: FAMILY MEDICINE | Facility: CLINIC | Age: 44
End: 2024-08-14
Payer: COMMERCIAL

## 2024-08-14 ENCOUNTER — PATIENT MESSAGE (OUTPATIENT)
Dept: FAMILY MEDICINE | Facility: CLINIC | Age: 44
End: 2024-08-14

## 2024-08-14 VITALS
HEIGHT: 59 IN | DIASTOLIC BLOOD PRESSURE: 80 MMHG | SYSTOLIC BLOOD PRESSURE: 126 MMHG | HEART RATE: 89 BPM | WEIGHT: 178 LBS | BODY MASS INDEX: 35.88 KG/M2 | OXYGEN SATURATION: 98 %

## 2024-08-14 DIAGNOSIS — J32.0 CHRONIC MAXILLARY SINUSITIS: ICD-10-CM

## 2024-08-14 DIAGNOSIS — R05.1 ACUTE COUGH: Primary | ICD-10-CM

## 2024-08-14 DIAGNOSIS — R11.0 NAUSEA: ICD-10-CM

## 2024-08-14 DIAGNOSIS — H65.491 OTHER CHRONIC NONSUPPURATIVE OTITIS MEDIA OF RIGHT EAR: ICD-10-CM

## 2024-08-14 LAB
CTP QC/QA: YES
SARS-COV-2 RDRP RESP QL NAA+PROBE: NEGATIVE

## 2024-08-14 PROCEDURE — 99214 OFFICE O/P EST MOD 30 MIN: CPT | Mod: 25,S$GLB,, | Performed by: NURSE PRACTITIONER

## 2024-08-14 PROCEDURE — 3044F HG A1C LEVEL LT 7.0%: CPT | Mod: CPTII,S$GLB,, | Performed by: NURSE PRACTITIONER

## 2024-08-14 PROCEDURE — 87635 SARS-COV-2 COVID-19 AMP PRB: CPT | Mod: QW,S$GLB,, | Performed by: NURSE PRACTITIONER

## 2024-08-14 PROCEDURE — 96372 THER/PROPH/DIAG INJ SC/IM: CPT | Mod: S$GLB,,, | Performed by: NURSE PRACTITIONER

## 2024-08-14 PROCEDURE — 3008F BODY MASS INDEX DOCD: CPT | Mod: CPTII,S$GLB,, | Performed by: NURSE PRACTITIONER

## 2024-08-14 PROCEDURE — 1160F RVW MEDS BY RX/DR IN RCRD: CPT | Mod: CPTII,S$GLB,, | Performed by: NURSE PRACTITIONER

## 2024-08-14 PROCEDURE — 1159F MED LIST DOCD IN RCRD: CPT | Mod: CPTII,S$GLB,, | Performed by: NURSE PRACTITIONER

## 2024-08-14 PROCEDURE — 3074F SYST BP LT 130 MM HG: CPT | Mod: CPTII,S$GLB,, | Performed by: NURSE PRACTITIONER

## 2024-08-14 PROCEDURE — 3079F DIAST BP 80-89 MM HG: CPT | Mod: CPTII,S$GLB,, | Performed by: NURSE PRACTITIONER

## 2024-08-14 RX ORDER — BETAMETHASONE SODIUM PHOSPHATE AND BETAMETHASONE ACETATE 3; 3 MG/ML; MG/ML
9 INJECTION, SUSPENSION INTRA-ARTICULAR; INTRALESIONAL; INTRAMUSCULAR; SOFT TISSUE
Status: COMPLETED | OUTPATIENT
Start: 2024-08-14 | End: 2024-08-14

## 2024-08-14 RX ORDER — CIPROFLOXACIN HYDROCHLORIDE AND HYDROCORTISONE 2; 10 MG/ML; MG/ML
3 SUSPENSION AURICULAR (OTIC) 2 TIMES DAILY
Qty: 10 ML | Refills: 0 | Status: SHIPPED | OUTPATIENT
Start: 2024-08-14 | End: 2024-08-15 | Stop reason: SDUPTHER

## 2024-08-14 RX ORDER — ONDANSETRON 4 MG/1
8 TABLET, FILM COATED ORAL 2 TIMES DAILY
Qty: 30 TABLET | Refills: 0 | Status: SHIPPED | OUTPATIENT
Start: 2024-08-14

## 2024-08-14 RX ORDER — SODIUM, POTASSIUM,MAG SULFATES 17.5-3.13G
1 SOLUTION, RECONSTITUTED, ORAL ORAL
COMMUNITY
Start: 2024-08-07

## 2024-08-14 RX ORDER — FLUTICASONE PROPIONATE 50 MCG
1 SPRAY, SUSPENSION (ML) NASAL DAILY
Qty: 9.9 ML | Refills: 0 | Status: SHIPPED | OUTPATIENT
Start: 2024-08-14

## 2024-08-14 RX ADMIN — BETAMETHASONE SODIUM PHOSPHATE AND BETAMETHASONE ACETATE 9 MG: 3; 3 INJECTION, SUSPENSION INTRA-ARTICULAR; INTRALESIONAL; INTRAMUSCULAR; SOFT TISSUE at 09:08

## 2024-08-14 NOTE — PROGRESS NOTES
Patient ID: Rosa Anguiano  MRN: [id number]      History of Present Illness:  The patient is a 43 y.o. Other female who presents to clinic with c/o right ear pain. She has completed the amoxicillin prescribed at last visit and said she is still having the ear pain and now increased nasal congestion , cough , nausea related to the mucous and body aches. Pt reports her 13 year old has been sick.     Allergies: Patient is allergic to nabumetone and dulaglutide.     Smoking status:  Social History     Tobacco Use   Smoking Status Never   Smokeless Tobacco Never       Problem List:  Patient Active Problem List   Diagnosis    Fibromyalgia    Class 1 obesity due to excess calories with serious comorbidity and body mass index (BMI) of 34.0 to 34.9 in adult    Type 2 diabetes mellitus, without long-term current use of insulin    GERD (gastroesophageal reflux disease)    Muscle spasm        Current Medications:  Current Outpatient Medications   Medication Instructions    azelastine (ASTELIN) 137 mcg, Nasal, 2 times daily    blood-glucose meter,continuous (DEXCOM G6 ) Misc Use as instructed    blood-glucose sensor (DEXCOM G6 SENSOR) Kath Use as instructed    blood-glucose transmitter (DEXCOM G6 TRANSMITTER) Kath Use as instructed    boric acid (BORIC ACID) 650 mg, Vaginal, Every 7 days    boric acid (PH-D) 600 mg, Vaginal, Nightly, As directed    CARAFATE 1 g, Oral, Before meals & nightly    ciprofloxacin-hydrocortisone 0.2-1% (CIPRO HC) otic suspension 3 drops, Right Ear, 2 times daily    clotrimazole-betamethasone 1-0.05% (LOTRISONE) cream Topical (Top), 2 times daily    estradioL (ESTRACE) 1 g, Vaginal, Every Mon, Wed, Fri    famotidine (PEPCID) 40 MG tablet TAKE 1 TABLET BY MOUTH ONCE A DAY AS NEEDED FOR HEARTBURN    fluconazole (DIFLUCAN) 150 mg, Oral, Weekly    fluticasone propionate (FLONASE) 50 mcg, Each Nostril, Daily    FOLIC ACID ORAL Oral    hydrocortisone (ANUSOL-HC) 25 mg, Rectal, As needed (PRN)    LANTUS  "SOLOSTAR U-100 INSULIN 19 Units, Subcutaneous, Daily    methjimparkerblue-s.phos-phsal-hyo (URIBEL) 118-10-40.8-36 mg Cap 1 capsule, Oral, 3 times daily PRN    methocarbamoL (ROBAXIN) 500 mg, Oral, 4 times daily PRN    methotrexate 2.5 mg, Oral, Every 7 days    montelukast (SINGULAIR) 10 mg, Oral, Daily    MOUNJARO 2.5 mg, Subcutaneous, Every 7 days    nystatin (MYCOSTATIN) cream Topical (Top), 2 times daily    ondansetron (ZOFRAN) 4 mg, Oral, Every 6 hours PRN    ondansetron (ZOFRAN) 8 mg, Oral, 2 times daily    ondansetron (ZOFRAN-ODT) 4 mg, Oral, Every 6 hours PRN    pantoprazole (PROTONIX) 40 mg, Oral, Daily    pregabalin (LYRICA) 150 mg, Oral, 2 times daily    PROCTOZONE-HC 30 g, Rectal, As needed (PRN)    sodium,potassium,mag sulfates (SUPREP BOWEL PREP KIT) 17.5-3.13-1.6 gram SolR 1 Bottle    UNABLE TO FIND medication name: magnesium       Review of Systems   Constitutional:  Positive for fatigue. Negative for activity change, appetite change and fever.   HENT:  Positive for nasal congestion, ear pain, sinus pressure/congestion, sneezing and sore throat.    Eyes:  Negative for discharge, redness and itching.   Respiratory:  Positive for cough. Negative for chest tightness, shortness of breath and wheezing.    Cardiovascular:  Negative for chest pain and leg swelling.   Gastrointestinal:  Negative for abdominal pain, constipation, diarrhea and nausea.   Endocrine: Negative for cold intolerance and heat intolerance.   Genitourinary:  Negative for difficulty urinating, dysuria, frequency and urgency.   Musculoskeletal:  Negative for back pain and joint swelling.   Integumentary:  Negative for rash.   Neurological:  Negative for dizziness and numbness.   Psychiatric/Behavioral:  Negative for agitation and behavioral problems.         Visit Vitals  /80 (BP Location: Left arm, Patient Position: Sitting, BP Method: Medium (Manual))   Pulse 89   Ht 4' 11" (1.499 m)   Wt 80.7 kg (178 lb)   SpO2 98%   BMI 35.95 kg/m² "       Physical Exam  Vitals and nursing note reviewed.   Constitutional:       Appearance: Normal appearance.   HENT:      Head: Normocephalic.      Comments: Erythema right ear, TM dull   Erythema left ear canal, TM intact , wnl      Nose: Congestion present.      Mouth/Throat:      Mouth: Mucous membranes are moist.      Comments: Posterior pharynx + for erythema, no exudate  Eyes:      Comments: Conjunctiva slightly injected, no exudate    Cardiovascular:      Rate and Rhythm: Normal rate and regular rhythm.   Pulmonary:      Effort: Pulmonary effort is normal.      Breath sounds: Normal breath sounds.   Abdominal:      Palpations: Abdomen is soft.   Musculoskeletal:         General: Normal range of motion.      Cervical back: Normal range of motion.   Skin:     General: Skin is warm and dry.   Neurological:      General: No focal deficit present.      Mental Status: She is alert.   Psychiatric:         Mood and Affect: Mood normal.         Behavior: Behavior normal.          Assessment & Plan:  1. Acute cough  -     POCT COVID-19 Rapid Screening  Reviewed with patient negative covid and flu test.   Continue the Mucinex to thin and loosen secretions to make expectoration easier. Increase fluids especially warm fluids.     2. Chronic maxillary sinusitis  -     betamethasone acetate-betamethasone sodium phosphate injection 9 mg  -     fluticasone propionate (FLONASE) 50 mcg/actuation nasal spray; 1 spray (50 mcg total) by Each Nostril route once daily.  Dispense: 9.9 mL; Refill: 0  Continue the normal saline nasal spray and follow with flonase bid.     3. Other chronic nonsuppurative otitis media of right ear  -     ciprofloxacin-hydrocortisone 0.2-1% (CIPRO HC) otic suspension; Place 3 drops into the right ear 2 (two) times daily.  Dispense: 10 mL; Refill: 0  Pt said she has completed the amoxicillin but right ear still hurts. The ear still has some erythema noted, fluid .      4. Nausea  -     ondansetron  (ZOFRAN) 4 MG tablet; Take 2 tablets (8 mg total) by mouth 2 (two) times daily.  Dispense: 30 tablet; Refill: 0         Future Appointments   Date Time Provider Department Center   7/7/2025  9:20 AM Antonia Nieves NP [facility] UROLOGY  [place]       Lab Frequency Next Occurrence   Ambulatory referral/consult to Psychiatry Once 12/15/2023   Ambulatory referral/consult to ENT Once 01/10/2024   Ambulatory referral/consult to Gastroenterology Once 05/02/2024   Ambulatory referral/consult to Gastroenterology Once 05/02/2024   Mammo Digital Diagnostic Bilat Once 06/19/2024   Mammo Digital Diagnostic Bilat with [facility] Once 06/19/2024     Follow up if symptoms worsen or fail to improve.    Peyton Hernández NP

## 2024-08-15 ENCOUNTER — PATIENT MESSAGE (OUTPATIENT)
Dept: FAMILY MEDICINE | Facility: CLINIC | Age: 44
End: 2024-08-15
Payer: COMMERCIAL

## 2024-08-15 ENCOUNTER — PATIENT MESSAGE (OUTPATIENT)
Dept: PRIMARY CARE CLINIC | Facility: CLINIC | Age: 44
End: 2024-08-15
Payer: COMMERCIAL

## 2024-08-15 DIAGNOSIS — H65.491 OTHER CHRONIC NONSUPPURATIVE OTITIS MEDIA OF RIGHT EAR: ICD-10-CM

## 2024-08-15 RX ORDER — OFLOXACIN 3 MG/ML
5 SOLUTION AURICULAR (OTIC) DAILY
Qty: 10 ML | Refills: 0 | Status: SHIPPED | OUTPATIENT
Start: 2024-08-15

## 2024-08-15 RX ORDER — CIPROFLOXACIN HYDROCHLORIDE AND HYDROCORTISONE 2; 10 MG/ML; MG/ML
3 SUSPENSION AURICULAR (OTIC) 2 TIMES DAILY
Qty: 10 ML | Refills: 0 | Status: SHIPPED | OUTPATIENT
Start: 2024-08-15 | End: 2024-08-15

## 2024-09-20 ENCOUNTER — OFFICE VISIT (OUTPATIENT)
Dept: UROLOGY | Facility: CLINIC | Age: 44
End: 2024-09-20
Payer: COMMERCIAL

## 2024-09-20 VITALS
DIASTOLIC BLOOD PRESSURE: 90 MMHG | HEIGHT: 59 IN | WEIGHT: 172 LBS | OXYGEN SATURATION: 99 % | SYSTOLIC BLOOD PRESSURE: 138 MMHG | BODY MASS INDEX: 34.68 KG/M2 | HEART RATE: 80 BPM

## 2024-09-20 DIAGNOSIS — R30.0 DYSURIA: ICD-10-CM

## 2024-09-20 DIAGNOSIS — N39.3 STRESS INCONTINENCE: ICD-10-CM

## 2024-09-20 DIAGNOSIS — N39.41 URGE INCONTINENCE OF URINE: Primary | ICD-10-CM

## 2024-09-20 DIAGNOSIS — R30.0 DYSURIA: Primary | ICD-10-CM

## 2024-09-20 LAB
BILIRUBIN, UA POC OHS: NEGATIVE
BLOOD, UA POC OHS: ABNORMAL
CLARITY, UA POC OHS: CLEAR
COLOR, UA POC OHS: YELLOW
GLUCOSE, UA POC OHS: NEGATIVE
KETONES, UA POC OHS: NEGATIVE
LEUKOCYTES, UA POC OHS: NEGATIVE
NITRITE, UA POC OHS: NEGATIVE
PH, UA POC OHS: 5.5
PROTEIN, UA POC OHS: NEGATIVE
SPECIFIC GRAVITY, UA POC OHS: 1.02
UROBILINOGEN, UA POC OHS: 0.2

## 2024-09-20 RX ORDER — MUPIROCIN 20 MG/G
OINTMENT TOPICAL 3 TIMES DAILY
Qty: 30 G | Refills: 1 | Status: SHIPPED | OUTPATIENT
Start: 2024-09-20 | End: 2025-09-20

## 2024-09-20 RX ORDER — INSULIN LISPRO 100 [IU]/ML
INJECTION, SOLUTION INTRAVENOUS; SUBCUTANEOUS
COMMUNITY
Start: 2024-08-27

## 2024-09-20 NOTE — PROGRESS NOTES
Subjective:       Patient ID: Rosa Anguiano is a 43 y.o. female.    Chief Complaint: No chief complaint on file.      HPI: 43-year-old female patient presenting to the clinic for six-month follow up.  Patient has a history of stress incontinence.  The patient has had cystoscopy in the past recommending TOT however the patient declined due to rheumatoid arthritis.  She has tried pelvic floor therapy with no improvement.  She also has a history of interstitial cystitis for which she underwent cysto hydrodistention in the past.  She also takes Uribel, Robaxin, D mannose and aloe vera.  She denies any dysuria or discomfort today.  She recently has seen increase in frequency and urgency.  She gets up several times per night.  She has been prescribed oxybutynin and Myrbetriq in the past         Past Medical History:   Past Medical History:   Diagnosis Date    Acute pelvic pain, female     Breast pain     Cystitis     Diabetes mellitus     Dyspareunia, female     Endometriosis     Fibromyalgia     GERD (gastroesophageal reflux disease)     IBS (irritable bowel syndrome)     Interstitial cystitis     Irregular menstrual cycle     Leukorrhea     Osteoarthritis     Ovarian cyst     Pneumonia     YAMILET (stress urinary incontinence, female)     Vaginal yeast infection     Vulvitis        Past Surgical Historical:   Past Surgical History:   Procedure Laterality Date     SECTION      CHOLECYSTECTOMY      LAPAROSCOPY-ASSISTED VAGINAL HYSTERECTOMY      TUBAL LIGATION          Medications:   Medication List with Changes/Refills   Current Medications    AZELASTINE (ASTELIN) 137 MCG (0.1 %) NASAL SPRAY    1 spray (137 mcg total) by Nasal route 2 (two) times daily.    BLOOD-GLUCOSE METER,CONTINUOUS (DEXCOM G6 ) MISC    Use as instructed    BLOOD-GLUCOSE SENSOR (DEXCOM G6 SENSOR) MARIELENA    Use as instructed    BLOOD-GLUCOSE TRANSMITTER (DEXCOM G6 TRANSMITTER) MARIELENA    Use as instructed    BORIC ACID (BORIC ACID) VAGINAL  SUPPOSITORY    Place 650 mg vaginally every 7 days.    BORIC ACID (PH-D) 600 MG VAGINAL SUPPOSITORY    Place 1 suppository (600 mg total) vaginally every evening. As directed    CARAFATE 1 GRAM TABLET    Take 1 g by mouth 4 (four) times daily before meals and nightly.    CLOTRIMAZOLE-BETAMETHASONE 1-0.05% (LOTRISONE) CREAM    Apply topically 2 (two) times daily.    ESTRADIOL (ESTRACE) 0.01 % (0.1 MG/GRAM) VAGINAL CREAM    Place 1 g vaginally every Mon, Wed, Fri.    FAMOTIDINE (PEPCID) 40 MG TABLET    TAKE 1 TABLET BY MOUTH ONCE A DAY AS NEEDED FOR HEARTBURN    FLUCONAZOLE (DIFLUCAN) 150 MG TAB    Take 150 mg by mouth once a week.    FLUTICASONE PROPIONATE (FLONASE) 50 MCG/ACTUATION NASAL SPRAY    1 spray (50 mcg total) by Each Nostril route once daily.    FOLIC ACID ORAL    Take by mouth.    HUMALOG KWIKPEN INSULIN 100 UNIT/ML PEN    Inject subcutaneously three times a DAY with meals as PER sliding scale. Max 30 units/day. <200 = 0 units. 201-250 = 3 units. 251-300 = 6 units. 301-350 = 9 units. >350 = 12 units.    HYDROCORTISONE (ANUSOL-HC) 25 MG SUPPOSITORY    Place 25 mg rectally as needed.    LANTUS SOLOSTAR U-100 INSULIN 100 UNIT/ML (3 ML) INPN PEN    Inject 19 Units into the skin once daily.    METHEN-MDamonBLUE-S.PHOS-PHSAL-HYO (URIBEL) 118-10-40.8-36 MG CAP    Take 1 capsule by mouth 3 (three) times daily as needed (bladder pain).    METHOCARBAMOL (ROBAXIN) 500 MG TAB    Take 1 tablet (500 mg total) by mouth 4 (four) times daily as needed (spasms).    METHOTREXATE 2.5 MG TAB    Take 2.5 mg by mouth every 7 days.    MONTELUKAST (SINGULAIR) 10 MG TABLET    Take 10 mg by mouth once daily.    MOUNJARO 2.5 MG/0.5 ML PNIJ    Inject 2.5 mg into the skin every 7 days.    NYSTATIN (MYCOSTATIN) CREAM    Apply topically 2 (two) times daily.    OFLOXACIN (FLOXIN) 0.3 % OTIC SOLUTION    Place 5 drops into the right ear once daily.    ONDANSETRON (ZOFRAN) 4 MG TABLET    Take 4 mg by mouth every 6 (six) hours as needed.     ONDANSETRON (ZOFRAN) 4 MG TABLET    Take 2 tablets (8 mg total) by mouth 2 (two) times daily.    ONDANSETRON (ZOFRAN-ODT) 4 MG TBDL    Take 4 mg by mouth every 6 (six) hours as needed.    PANTOPRAZOLE (PROTONIX) 40 MG TABLET    Take 40 mg by mouth once daily.    PREGABALIN (LYRICA) 150 MG CAPSULE    Take 1 capsule (150 mg total) by mouth 2 (two) times daily.    PROCTOZONE-HC 2.5 % RECTAL CREAM    Place 30 g rectally as needed.    SODIUM,POTASSIUM,MAG SULFATES (SUPREP BOWEL PREP KIT) 17.5-3.13-1.6 GRAM SOLR    1 Bottle.    UNABLE TO FIND    medication name: magnesium        Past Social History:   Social History     Socioeconomic History    Marital status:    Tobacco Use    Smoking status: Never    Smokeless tobacco: Never   Substance and Sexual Activity    Alcohol use: Not Currently    Drug use: Never    Sexual activity: Yes     Partners: Male     Birth control/protection: See Surgical Hx     Comment: Hysterectomy       Allergies:   Review of patient's allergies indicates:   Allergen Reactions    Nabumetone Other (See Comments)     Diffculty swallowing    Dulaglutide Nausea And Vomiting     Severe diarrhea        Family History:   Family History   Problem Relation Name Age of Onset    Colon cancer Father  60    Prostate cancer Father  60    Breast cancer Sister  66    Ovarian cancer Neg Hx      Uterine cancer Neg Hx      Melanoma Neg Hx      Pancreatic cancer Neg Hx          Review of Systems:  Review of Systems   Constitutional:  Negative for activity change, appetite change, chills, diaphoresis, fatigue, fever and unexpected weight change.   HENT:  Negative for congestion, dental problem, drooling, ear discharge, ear pain, facial swelling, hearing loss, mouth sores, nosebleeds, postnasal drip, rhinorrhea, sinus pressure, sinus pain, sneezing, sore throat, tinnitus, trouble swallowing and voice change.    Eyes:  Negative for photophobia, pain, discharge, redness, itching and visual disturbance.   Respiratory:   Negative for apnea, cough, choking, chest tightness, shortness of breath, wheezing and stridor.    Cardiovascular:  Negative for chest pain and leg swelling.   Gastrointestinal:  Negative for abdominal distention, abdominal pain, anal bleeding, blood in stool, constipation, diarrhea, nausea, rectal pain and vomiting.   Endocrine: Negative for cold intolerance, heat intolerance, polydipsia, polyphagia and polyuria.   Genitourinary:  Positive for frequency and urgency. Negative for decreased urine volume, difficulty urinating, dyspareunia, dysuria, enuresis, flank pain, genital sores, hematuria, menstrual problem, pelvic pain, vaginal bleeding, vaginal discharge and vaginal pain.   Musculoskeletal:  Negative for arthralgias, back pain, gait problem, joint swelling, myalgias, neck pain and neck stiffness.   Skin:  Negative for color change, pallor, rash and wound.   Allergic/Immunologic: Negative for environmental allergies, food allergies and immunocompromised state.   Neurological:  Negative for dizziness, tremors, seizures, syncope, facial asymmetry, speech difficulty, weakness, light-headedness, numbness and headaches.   Hematological:  Negative for adenopathy. Does not bruise/bleed easily.   Psychiatric/Behavioral:  Negative for agitation, behavioral problems, confusion, decreased concentration, dysphoric mood, hallucinations, self-injury, sleep disturbance and suicidal ideas. The patient is not nervous/anxious and is not hyperactive.        Physical Exam:  Physical Exam  Exam conducted with a chaperone present.   Constitutional:       Appearance: Normal appearance.   HENT:      Head: Normocephalic.      Nose: Nose normal.      Mouth/Throat:      Mouth: Mucous membranes are moist.      Pharynx: Oropharynx is clear.   Eyes:      Extraocular Movements: Extraocular movements intact.      Conjunctiva/sclera: Conjunctivae normal.      Pupils: Pupils are equal, round, and reactive to light.   Cardiovascular:      Rate  and Rhythm: Normal rate and regular rhythm.      Pulses: Normal pulses.      Heart sounds: Normal heart sounds.   Pulmonary:      Effort: Pulmonary effort is normal.      Breath sounds: Normal breath sounds.   Abdominal:      General: Abdomen is flat. Bowel sounds are normal.      Palpations: Abdomen is soft.      Hernia: There is no hernia in the left inguinal area or right inguinal area.   Genitourinary:     General: Normal vulva.      Pubic Area: No rash or pubic lice.       Labia:         Right: No rash, tenderness, lesion or injury.         Left: No rash, tenderness, lesion or injury.       Urethra: No prolapse, urethral pain, urethral swelling or urethral lesion.      Rectum: Normal.   Musculoskeletal:         General: Normal range of motion.      Cervical back: Normal range of motion and neck supple.   Lymphadenopathy:      Lower Body: No right inguinal adenopathy. No left inguinal adenopathy.   Skin:     General: Skin is warm and dry.      Capillary Refill: Capillary refill takes less than 2 seconds.   Neurological:      General: No focal deficit present.      Mental Status: She is alert and oriented to person, place, and time. Mental status is at baseline.   Psychiatric:         Mood and Affect: Mood normal.         Behavior: Behavior normal.         Thought Content: Thought content normal.         Judgment: Judgment normal.         Assessment/Plan:     Stress incontinence:  Discussed cystoscopy for evaluation and TOT.  I encouraged her to diligently practice Kegel exercises and timed voiding to reduce urinary leakage. PVR 1 ml    Interstitial cystitis: Refill the patient's medications at this time.  Avoid dietary triggers.  The patient has had cysto hydrodistention in the past.    Urinary urgency and frequency:  Patient was failed oxybutynin and Myrbetriq.  Four weeks of Gemtesa provided for the patient.  Follow up in 1 month.    Follow up in 1 year.  Complete urine drop-off if symptoms of infection or  interstitial cystitis develop.  Problem List Items Addressed This Visit    None  Visit Diagnoses       Dysuria    -  Primary    Relevant Orders    POCT Urinalysis(Instrument)    POCT Bladder Scan    Stress incontinence        Relevant Orders    POCT Bladder Scan

## 2024-09-24 ENCOUNTER — TELEPHONE (OUTPATIENT)
Dept: UROLOGY | Facility: CLINIC | Age: 44
End: 2024-09-24
Payer: COMMERCIAL

## 2024-09-24 DIAGNOSIS — N39.41 URGE INCONTINENCE OF URINE: Primary | ICD-10-CM

## 2024-09-24 NOTE — TELEPHONE ENCOUNTER
Medication request sent to provider.    ----- Message from Evan Orozco sent at 9/24/2024 10:18 AM CDT -----  Contact: Self  Type: Needs Medical Advice  Who Called:  Patient  Symptoms (please be specific):  Frequency    Pharmacy name and phone #:    [facility] - [place], [place] - [address] [address]  Phone: [phone] Fax: [phone]      Best Call Back Number: [phone]   Additional Information: Called to have Frequency medication refilled. Could not recall name.

## 2024-10-28 ENCOUNTER — PATIENT MESSAGE (OUTPATIENT)
Dept: PRIMARY CARE CLINIC | Facility: CLINIC | Age: 44
End: 2024-10-28
Payer: COMMERCIAL

## 2024-10-28 ENCOUNTER — TELEPHONE (OUTPATIENT)
Dept: PRIMARY CARE CLINIC | Facility: CLINIC | Age: 44
End: 2024-10-28
Payer: COMMERCIAL

## 2024-10-29 ENCOUNTER — PATIENT MESSAGE (OUTPATIENT)
Dept: PRIMARY CARE CLINIC | Facility: CLINIC | Age: 44
End: 2024-10-29

## 2024-10-30 DIAGNOSIS — R30.0 DYSURIA: ICD-10-CM

## 2024-11-04 ENCOUNTER — OFFICE VISIT (OUTPATIENT)
Dept: FAMILY MEDICINE | Facility: CLINIC | Age: 44
End: 2024-11-04
Payer: COMMERCIAL

## 2024-11-04 VITALS
DIASTOLIC BLOOD PRESSURE: 74 MMHG | BODY MASS INDEX: 35.08 KG/M2 | OXYGEN SATURATION: 99 % | WEIGHT: 174 LBS | RESPIRATION RATE: 15 BRPM | SYSTOLIC BLOOD PRESSURE: 134 MMHG | HEART RATE: 74 BPM | HEIGHT: 59 IN

## 2024-11-04 DIAGNOSIS — M79.10 MYALGIA: ICD-10-CM

## 2024-11-04 DIAGNOSIS — K59.00 CONSTIPATION, UNSPECIFIED CONSTIPATION TYPE: Primary | ICD-10-CM

## 2024-11-04 DIAGNOSIS — M79.7 FIBROMYALGIA: ICD-10-CM

## 2024-11-04 DIAGNOSIS — E11.9 TYPE 2 DIABETES MELLITUS WITHOUT COMPLICATION, WITHOUT LONG-TERM CURRENT USE OF INSULIN: ICD-10-CM

## 2024-11-04 DIAGNOSIS — K21.9 GASTROESOPHAGEAL REFLUX DISEASE, UNSPECIFIED WHETHER ESOPHAGITIS PRESENT: ICD-10-CM

## 2024-11-04 DIAGNOSIS — M54.9 BACK PAIN, UNSPECIFIED BACK LOCATION, UNSPECIFIED BACK PAIN LATERALITY, UNSPECIFIED CHRONICITY: ICD-10-CM

## 2024-11-04 DIAGNOSIS — M79.2 NEURALGIA: ICD-10-CM

## 2024-11-04 PROCEDURE — 1159F MED LIST DOCD IN RCRD: CPT | Mod: CPTII,S$GLB,, | Performed by: PHYSICIAN ASSISTANT

## 2024-11-04 PROCEDURE — 3075F SYST BP GE 130 - 139MM HG: CPT | Mod: CPTII,S$GLB,, | Performed by: PHYSICIAN ASSISTANT

## 2024-11-04 PROCEDURE — 99214 OFFICE O/P EST MOD 30 MIN: CPT | Mod: S$GLB,,, | Performed by: PHYSICIAN ASSISTANT

## 2024-11-04 PROCEDURE — 3008F BODY MASS INDEX DOCD: CPT | Mod: CPTII,S$GLB,, | Performed by: PHYSICIAN ASSISTANT

## 2024-11-04 PROCEDURE — 3078F DIAST BP <80 MM HG: CPT | Mod: CPTII,S$GLB,, | Performed by: PHYSICIAN ASSISTANT

## 2024-11-04 PROCEDURE — 3044F HG A1C LEVEL LT 7.0%: CPT | Mod: CPTII,S$GLB,, | Performed by: PHYSICIAN ASSISTANT

## 2024-11-04 RX ORDER — HYDROCHLOROTHIAZIDE 12.5 MG/1
CAPSULE ORAL
COMMUNITY
Start: 2024-10-23

## 2024-11-04 RX ORDER — PREGABALIN 150 MG/1
150 CAPSULE ORAL 2 TIMES DAILY
Qty: 60 CAPSULE | Refills: 4 | Status: SHIPPED | OUTPATIENT
Start: 2024-11-04

## 2024-11-04 NOTE — PROGRESS NOTES
Subjective:      Patient ID: Rosa Anguiano is a 44 y.o. female.    Chief Complaint: Follow-up (Recent CRC was negative per patient /Pt is agreeable to seeing someone outside of  for a GI specialist /Med refill for lyrica - needs a new RA specialist )      HPI  Patient is here today with new complaints  Reports that she has dealt with chronic constipation for years, she had a recent colonoscopy with Dr. Tellez.  She is currently on MiraLax daily.  She has not tried Linzess in the past.  Sampled Linzess x3 -72 mcg 144 mcg and 290 mcg      RA- currently seeing Dr. Flores, would like 2nd opinion.  Sending new referral today.  Needs refill on lyrica in the interim,   reviewed     Mid back pain radiating around right ribcage and upper abdomen-patient reports she had injections with Dr. Mcguire years ago.  Will obtain this imaging and refer to Dr. Ernst for consult    Due for labs   Review of Systems   Constitutional:  Negative for activity change, appetite change, chills, diaphoresis, fatigue and unexpected weight change.   Eyes:  Negative for visual disturbance.   Respiratory:  Negative for cough, chest tightness, shortness of breath and wheezing.    Cardiovascular:  Negative for chest pain, palpitations and leg swelling.   Gastrointestinal:  Positive for abdominal pain and constipation. Negative for nausea and vomiting.   Musculoskeletal:  Positive for arthralgias, back pain and myalgias.   Neurological:  Negative for dizziness, vertigo, tremors, syncope, weakness, light-headedness, numbness and headaches.   Psychiatric/Behavioral:  Negative for agitation, behavioral problems, confusion, decreased concentration, dysphoric mood, hallucinations, self-injury, sleep disturbance and suicidal ideas. The patient is not nervous/anxious and is not hyperactive.        Medication List with Changes/Refills   New Medications    LINACLOTIDE (LINZESS) 72 MCG CAP CAPSULE    Take 1 capsule (72 mcg total) by mouth before breakfast.    Current Medications    CARAFATE 1 GRAM TABLET    Take 1 g by mouth 4 (four) times daily before meals and nightly.    CLOTRIMAZOLE-BETAMETHASONE 1-0.05% (LOTRISONE) CREAM    Apply topically 2 (two) times daily.    ESTRADIOL (ESTRACE) 0.01 % (0.1 MG/GRAM) VAGINAL CREAM    Place 1 g vaginally every Mon, Wed, Fri.    FAMOTIDINE (PEPCID) 40 MG TABLET    TAKE 1 TABLET BY MOUTH ONCE A DAY AS NEEDED FOR HEARTBURN    FLUCONAZOLE (DIFLUCAN) 150 MG TAB    Take 150 mg by mouth once a week.    FLUTICASONE PROPIONATE (FLONASE) 50 MCG/ACTUATION NASAL SPRAY    1 spray (50 mcg total) by Each Nostril route once daily.    FOLIC ACID ORAL    Take by mouth.    FREESTYLE MICHAEL 3 PLUS SENSOR MARIELENA    Apply one device TO SKIN AND replace EVERY 14 DAYS    HUMALOG KWIKPEN INSULIN 100 UNIT/ML PEN    Inject subcutaneously three times a DAY with meals as PER sliding scale. Max 30 units/day. <200 = 0 units. 201-250 = 3 units. 251-300 = 6 units. 301-350 = 9 units. >350 = 12 units.    HYDROCORTISONE (ANUSOL-HC) 25 MG SUPPOSITORY    Place 25 mg rectally as needed.    LANTUS SOLOSTAR U-100 INSULIN 100 UNIT/ML (3 ML) INPN PEN    Inject 19 Units into the skin once daily.    METHEN-M.BLUE-S.PHOS-PHSAL-HYO (URIBEL) 118-10-40.8-36 MG CAP    Take 1 capsule by mouth 3 (three) times daily as needed (bladder pain).    METHOCARBAMOL (ROBAXIN) 500 MG TAB    Take 1 tablet (500 mg total) by mouth 4 (four) times daily as needed (spasms).    METHOTREXATE 2.5 MG TAB    Take 2.5 mg by mouth every 7 days.    MONTELUKAST (SINGULAIR) 10 MG TABLET    Take 10 mg by mouth once daily.    MOUNJARO 2.5 MG/0.5 ML PNIJ    Inject 2.5 mg into the skin every 7 days.    MUPIROCIN (BACTROBAN) 2 % OINTMENT    Apply topically 3 (three) times daily.    NYSTATIN (MYCOSTATIN) CREAM    Apply topically 2 (two) times daily.    OFLOXACIN (FLOXIN) 0.3 % OTIC SOLUTION    Place 5 drops into the right ear once daily.    ONDANSETRON (ZOFRAN) 4 MG TABLET    Take 4 mg by mouth every 6 (six) hours  "as needed.    ONDANSETRON (ZOFRAN) 4 MG TABLET    Take 2 tablets (8 mg total) by mouth 2 (two) times daily.    ONDANSETRON (ZOFRAN-ODT) 4 MG TBDL    Take 4 mg by mouth every 6 (six) hours as needed.    PANTOPRAZOLE (PROTONIX) 40 MG TABLET    Take 40 mg by mouth once daily.    PROCTOZONE-HC 2.5 % RECTAL CREAM    Place 30 g rectally as needed.    SODIUM,POTASSIUM,MAG SULFATES (SUPREP BOWEL PREP KIT) 17.5-3.13-1.6 GRAM SOLR    1 Bottle.    UNABLE TO FIND    medication name: magnesium    VIBEGRON 75 MG TAB    Take 1 tablet (75 mg total) by mouth once daily.   Changed and/or Refilled Medications    Modified Medication Previous Medication    PREGABALIN (LYRICA) 150 MG CAPSULE pregabalin (LYRICA) 150 MG capsule       Take 1 capsule (150 mg total) by mouth 2 (two) times daily.    Take 1 capsule (150 mg total) by mouth 2 (two) times daily.        Objective:     Vitals:    11/04/24 1007   BP: 134/74   Pulse: 74   Resp: 15   SpO2: 99%   Weight: 78.9 kg (174 lb)   Height: 4' 11" (1.499 m)        Physical Exam  Constitutional:       Appearance: Normal appearance. She is normal weight.   HENT:      Head: Normocephalic and atraumatic.      Nose: Nose normal.   Eyes:      Conjunctiva/sclera: Conjunctivae normal.      Comments: Eyes tracking normal on exam    Cardiovascular:      Rate and Rhythm: Normal rate and regular rhythm.      Pulses: Normal pulses.      Heart sounds: Normal heart sounds. No murmur heard.     No friction rub. No gallop.   Pulmonary:      Effort: Pulmonary effort is normal. No respiratory distress.      Breath sounds: Normal breath sounds. No stridor. No wheezing, rhonchi or rales.   Musculoskeletal:      Right lower leg: No edema.      Left lower leg: No edema.      Comments: Moves all extremities well and with good control  Normal gait observed      Skin:     General: Skin is warm and dry.      Capillary Refill: Capillary refill takes less than 2 seconds.   Neurological:      Mental Status: She is alert and " oriented to person, place, and time.   Psychiatric:         Mood and Affect: Mood normal.         Behavior: Behavior normal.         Thought Content: Thought content normal.         Judgment: Judgment normal.            Assessment & Plan:     Constipation, unspecified constipation type  -     linaCLOtide (LINZESS) 72 mcg Cap capsule; Take 1 capsule (72 mcg total) by mouth before breakfast.  Dispense: 30 capsule; Refill: 5  -     CBC Auto Differential; Future; Expected date: 11/04/2024  -     Comprehensive Metabolic Panel; Future; Expected date: 11/04/2024  -     Lipid Panel; Future; Expected date: 11/04/2024  -     Hemoglobin A1C; Future; Expected date: 11/04/2024  -     T4, Free; Future; Expected date: 11/04/2024  -     TSH; Future; Expected date: 11/04/2024  -     Urinalysis, Reflex to Urine Culture Urine, Clean Catch; Future    Myalgia  -     pregabalin (LYRICA) 150 MG capsule; Take 1 capsule (150 mg total) by mouth 2 (two) times daily.  Dispense: 60 capsule; Refill: 4  -     CBC Auto Differential; Future; Expected date: 11/04/2024  -     Comprehensive Metabolic Panel; Future; Expected date: 11/04/2024  -     Lipid Panel; Future; Expected date: 11/04/2024  -     Hemoglobin A1C; Future; Expected date: 11/04/2024  -     T4, Free; Future; Expected date: 11/04/2024  -     TSH; Future; Expected date: 11/04/2024  -     Urinalysis, Reflex to Urine Culture Urine, Clean Catch; Future    Neuralgia  -     pregabalin (LYRICA) 150 MG capsule; Take 1 capsule (150 mg total) by mouth 2 (two) times daily.  Dispense: 60 capsule; Refill: 4  -     CBC Auto Differential; Future; Expected date: 11/04/2024  -     Comprehensive Metabolic Panel; Future; Expected date: 11/04/2024  -     Lipid Panel; Future; Expected date: 11/04/2024  -     Hemoglobin A1C; Future; Expected date: 11/04/2024  -     T4, Free; Future; Expected date: 11/04/2024  -     TSH; Future; Expected date: 11/04/2024  -     Urinalysis, Reflex to Urine Culture Urine, Clean  Catch; Future    Type 2 diabetes mellitus without complication, without long-term current use of insulin  -     CBC Auto Differential; Future; Expected date: 11/04/2024  -     Comprehensive Metabolic Panel; Future; Expected date: 11/04/2024  -     Lipid Panel; Future; Expected date: 11/04/2024  -     Hemoglobin A1C; Future; Expected date: 11/04/2024  -     T4, Free; Future; Expected date: 11/04/2024  -     TSH; Future; Expected date: 11/04/2024  -     Urinalysis, Reflex to Urine Culture Urine, Clean Catch; Future    Gastroesophageal reflux disease, unspecified whether esophagitis present  -     CBC Auto Differential; Future; Expected date: 11/04/2024  -     Comprehensive Metabolic Panel; Future; Expected date: 11/04/2024  -     Lipid Panel; Future; Expected date: 11/04/2024  -     Hemoglobin A1C; Future; Expected date: 11/04/2024  -     T4, Free; Future; Expected date: 11/04/2024  -     TSH; Future; Expected date: 11/04/2024  -     Urinalysis, Reflex to Urine Culture Urine, Clean Catch; Future    Fibromyalgia  -     pregabalin (LYRICA) 150 MG capsule; Take 1 capsule (150 mg total) by mouth 2 (two) times daily.  Dispense: 60 capsule; Refill: 4  -     CBC Auto Differential; Future; Expected date: 11/04/2024  -     Comprehensive Metabolic Panel; Future; Expected date: 11/04/2024  -     Lipid Panel; Future; Expected date: 11/04/2024  -     Hemoglobin A1C; Future; Expected date: 11/04/2024  -     T4, Free; Future; Expected date: 11/04/2024  -     TSH; Future; Expected date: 11/04/2024  -     Urinalysis, Reflex to Urine Culture Urine, Clean Catch; Future    Back pain, unspecified back location, unspecified back pain laterality, unspecified chronicity  -     Ambulatory referral/consult to Pain Clinic; Future; Expected date: 11/11/2024           -Patient instructed that our office calls back on all labs and imaging within 1 week of receiving the results. Patient instructed to reach out to our office if they have not heard  from us so that we can request the results from the lab/imaging center           Tameka Dyson PA-C

## 2024-11-05 DIAGNOSIS — K59.00 CONSTIPATION, UNSPECIFIED CONSTIPATION TYPE: Primary | ICD-10-CM

## 2024-11-05 DIAGNOSIS — M79.7 FIBROMYALGIA: Primary | ICD-10-CM

## 2024-11-22 DIAGNOSIS — K59.00 CONSTIPATION, UNSPECIFIED CONSTIPATION TYPE: Primary | ICD-10-CM

## 2024-12-16 RX ORDER — PANTOPRAZOLE SODIUM 40 MG/1
40 TABLET, DELAYED RELEASE ORAL 2 TIMES DAILY
Qty: 60 TABLET | Refills: 2 | Status: SHIPPED | OUTPATIENT
Start: 2024-12-16

## 2025-01-29 ENCOUNTER — OFFICE VISIT (OUTPATIENT)
Dept: FAMILY MEDICINE | Facility: CLINIC | Age: 45
End: 2025-01-29
Payer: COMMERCIAL

## 2025-01-29 VITALS
OXYGEN SATURATION: 98 % | HEIGHT: 59 IN | DIASTOLIC BLOOD PRESSURE: 71 MMHG | WEIGHT: 178 LBS | HEART RATE: 111 BPM | SYSTOLIC BLOOD PRESSURE: 109 MMHG | BODY MASS INDEX: 35.88 KG/M2

## 2025-01-29 DIAGNOSIS — E66.01 SEVERE OBESITY (BMI 35.0-39.9) WITH COMORBIDITY: ICD-10-CM

## 2025-01-29 DIAGNOSIS — J30.1 SEASONAL ALLERGIC RHINITIS DUE TO POLLEN: Primary | ICD-10-CM

## 2025-01-29 DIAGNOSIS — M05.79 RHEUMATOID ARTHRITIS INVOLVING MULTIPLE SITES WITH POSITIVE RHEUMATOID FACTOR: ICD-10-CM

## 2025-01-29 DIAGNOSIS — E11.69 TYPE 2 DIABETES MELLITUS WITH OTHER SPECIFIED COMPLICATION, WITHOUT LONG-TERM CURRENT USE OF INSULIN: ICD-10-CM

## 2025-01-29 PROCEDURE — 99214 OFFICE O/P EST MOD 30 MIN: CPT | Mod: S$GLB,,, | Performed by: NURSE PRACTITIONER

## 2025-01-29 PROCEDURE — 1159F MED LIST DOCD IN RCRD: CPT | Mod: CPTII,S$GLB,, | Performed by: NURSE PRACTITIONER

## 2025-01-29 PROCEDURE — 3008F BODY MASS INDEX DOCD: CPT | Mod: CPTII,S$GLB,, | Performed by: NURSE PRACTITIONER

## 2025-01-29 PROCEDURE — 3074F SYST BP LT 130 MM HG: CPT | Mod: CPTII,S$GLB,, | Performed by: NURSE PRACTITIONER

## 2025-01-29 PROCEDURE — 1160F RVW MEDS BY RX/DR IN RCRD: CPT | Mod: CPTII,S$GLB,, | Performed by: NURSE PRACTITIONER

## 2025-01-29 PROCEDURE — 3078F DIAST BP <80 MM HG: CPT | Mod: CPTII,S$GLB,, | Performed by: NURSE PRACTITIONER

## 2025-01-29 RX ORDER — BENZONATATE 100 MG/1
100 CAPSULE ORAL 3 TIMES DAILY PRN
Qty: 30 CAPSULE | Refills: 0 | Status: SHIPPED | OUTPATIENT
Start: 2025-01-29 | End: 2025-02-03

## 2025-01-29 RX ORDER — HYDROXYZINE HYDROCHLORIDE 10 MG/1
10 TABLET, FILM COATED ORAL NIGHTLY
Qty: 30 TABLET | Refills: 0 | Status: SHIPPED | OUTPATIENT
Start: 2025-01-29

## 2025-01-29 RX ORDER — PANTOPRAZOLE SODIUM 40 MG/1
40 TABLET, DELAYED RELEASE ORAL DAILY
Qty: 30 TABLET | Refills: 2 | Status: SHIPPED | OUTPATIENT
Start: 2025-01-29

## 2025-01-29 NOTE — PROGRESS NOTES
Patient ID: Rosa Anguiano  MRN: [id number]      History of Present Illness:  The patient is a 44 y.o. Other female who presents to clinic with c/o itching ears, throat and nasal congestion with dry cough. Sx have been present for several days.   No fever, chills.   She sees Dr. Mancia for her DM   Rheumatologist for RA         Allergies: Patient is allergic to nabumetone and dulaglutide.     Smoking status:  Social History     Tobacco Use   Smoking Status Never   Smokeless Tobacco Never       Problem List:  Patient Active Problem List   Diagnosis    Rheumatoid arthritis involving multiple sites with positive rheumatoid factor    Fibromyalgia    Class 1 obesity due to excess calories with serious comorbidity and body mass index (BMI) of 34.0 to 34.9 in adult    Type 2 diabetes mellitus with other specified complication, without long-term current use of insulin    GERD (gastroesophageal reflux disease)    Muscle spasm    Severe obesity (BMI 35.0-39.9) with comorbidity        Current Medications:  Current Outpatient Medications   Medication Instructions    adalimumab (HUMIRA PEN SUBQ) Inject into the skin.    benzonatate (TESSALON) 100 mg, Oral, 3 times daily PRN    CARAFATE 1 g, Before meals & nightly    clotrimazole-betamethasone 1-0.05% (LOTRISONE) cream Topical (Top), 2 times daily    estradioL (ESTRACE) 1 g, Vaginal, Every Mon, Wed, Fri    famotidine (PEPCID) 40 MG tablet TAKE 1 TABLET BY MOUTH ONCE A DAY AS NEEDED FOR HEARTBURN    fluconazole (DIFLUCAN) 150 mg, Weekly    fluticasone propionate (FLONASE) 50 mcg, Each Nostril, Daily    FOLIC ACID ORAL Take by mouth.    FREESTYLE MICHAEL 3 PLUS SENSOR Kath Apply one device TO SKIN AND replace EVERY 14 DAYS    HUMALOG KWIKPEN INSULIN 100 unit/mL pen Inject subcutaneously three times a DAY with meals as PER sliding scale. Max 30 units/day. <200 = 0 units. 201-250 = 3 units. 251-300 = 6 units. 301-350 = 9 units. >350 = 12 units.    hydrocortisone (ANUSOL-HC) 25 mg, As  "needed (PRN)    hydrOXYzine HCL (ATARAX) 10 mg, Oral, Nightly    LANTUS SOLOSTAR U-100 INSULIN 19 Units, Daily    linaCLOtide (LINZESS) 72 mcg, Oral, Before breakfast    methallyson-m.blue-s.phos-phsal-hyo (URIBEL) 118-10-40.8-36 mg Cap 1 capsule, Oral, 3 times daily PRN    methocarbamoL (ROBAXIN) 500 mg, Oral, 4 times daily PRN    methotrexate 2.5 mg, Every 7 days    montelukast (SINGULAIR) 10 mg, Daily    MOUNJARO 2.5 mg, Every 7 days    mupirocin (BACTROBAN) 2 % ointment Topical (Top), 3 times daily    nystatin (MYCOSTATIN) cream Topical (Top), 2 times daily    ondansetron (ZOFRAN) 4 mg, Every 6 hours PRN    pantoprazole (PROTONIX) 40 mg, Oral, Daily    pregabalin (LYRICA) 150 mg, Oral, 2 times daily    PROCTOZONE-HC 30 g, As needed (PRN)    UNABLE TO FIND medication name: magnesium             Review of Systems   Constitutional:  Positive for fatigue. Negative for activity change, appetite change and fever.   HENT:  Positive for nasal congestion, ear pain, sinus pressure/congestion, sneezing and sore throat.    Eyes:  Negative for discharge, redness and itching.   Respiratory:  Positive for cough. Negative for chest tightness, shortness of breath and wheezing.    Cardiovascular:  Negative for chest pain and leg swelling.   Gastrointestinal:  Negative for abdominal pain, constipation, diarrhea and nausea.   Endocrine: Negative for cold intolerance and heat intolerance.   Genitourinary:  Negative for difficulty urinating, dysuria, frequency and urgency.   Musculoskeletal:  Negative for back pain and joint swelling.   Integumentary:  Negative for rash.   Neurological:  Negative for dizziness and numbness.   Psychiatric/Behavioral:  Negative for agitation and behavioral problems.         Visit Vitals  /71 (BP Location: Right arm, Patient Position: Sitting)   Pulse (!) 111   Ht 4' 11" (1.499 m)   Wt 80.7 kg (178 lb)   SpO2 98%   BMI 35.95 kg/m²       Physical Exam  Vitals and nursing note reviewed.   Constitutional: "       Appearance: Normal appearance.   HENT:      Head: Normocephalic.      Right Ear: Tympanic membrane normal.      Left Ear: Tympanic membrane normal.      Nose: Congestion present.      Mouth/Throat:      Mouth: Mucous membranes are moist.      Pharynx: Posterior oropharyngeal erythema present.   Eyes:      Conjunctiva/sclera: Conjunctivae normal.   Cardiovascular:      Rate and Rhythm: Normal rate and regular rhythm.   Pulmonary:      Effort: Pulmonary effort is normal.      Breath sounds: Normal breath sounds.   Musculoskeletal:         General: Normal range of motion.      Cervical back: Normal range of motion.   Skin:     General: Skin is warm and dry.   Neurological:      General: No focal deficit present.      Mental Status: She is alert.   Psychiatric:         Mood and Affect: Mood normal.         Behavior: Behavior normal.          Assessment & Plan:  1. Seasonal allergic rhinitis due to pollen  -     benzonatate (TESSALON) 100 MG capsule; Take 1 capsule (100 mg total) by mouth 3 (three) times daily as needed for Cough.  Dispense: 30 capsule; Refill: 0  -     hydrOXYzine HCL (ATARAX) 10 MG Tab; Take 1 tablet (10 mg total) by mouth nightly.  Dispense: 30 tablet; Refill: 0  Advised use of normal saline irrigation, use of flonase and an oral antihistamine to reduce symptoms.     2. Type 2 diabetes mellitus with other specified complication, without long-term current use of insulin  Follow up with Endocrinologist, Dr. Mancia.   Pt reports currently her glucose levels and A1c are stable.       3. Rheumatoid arthritis involving multiple sites with positive rheumatoid factor  She has recently started Humira       4. Severe obesity (BMI 35.0-39.9) with comorbidity  Decrease carbs, increase exercise as chencho       Other orders  -     pantoprazole (PROTONIX) 40 MG tablet; Take 1 tablet (40 mg total) by mouth once daily.  Dispense: 30 tablet; Refill: 2         Future Appointments   Date Time Provider Department  [place]   2/3/2025 10:00 AM Tameka Dyson PA-C [facility] [place]   7/7/2025  9:20 AM Antonia Nieves NP [facility] UROLOGY  [place]     Lab Frequency Next Occurrence   Ambulatory referral/consult to ENT Once 01/10/2024   Mammo Digital Diagnostic Bilat Once 06/19/2024   Mammo Digital Diagnostic Bilat with [facility] Once 06/19/2024   Ambulatory referral/consult to Pain Clinic Once 11/11/2024   Ambulatory referral/consult to Rheumatology Once 11/12/2024   Ambulatory referral/consult to Gastroenterology Once 11/29/2024     Follow up if symptoms worsen or fail to improve.      Peyton Hernández, NP

## 2025-02-03 ENCOUNTER — OFFICE VISIT (OUTPATIENT)
Dept: PRIMARY CARE CLINIC | Facility: CLINIC | Age: 45
End: 2025-02-03
Payer: COMMERCIAL

## 2025-02-03 VITALS
DIASTOLIC BLOOD PRESSURE: 98 MMHG | HEIGHT: 59 IN | BODY MASS INDEX: 35.92 KG/M2 | HEART RATE: 99 BPM | RESPIRATION RATE: 15 BRPM | WEIGHT: 178.19 LBS | OXYGEN SATURATION: 99 % | SYSTOLIC BLOOD PRESSURE: 152 MMHG

## 2025-02-03 DIAGNOSIS — I10 HYPERTENSION, UNSPECIFIED TYPE: Primary | ICD-10-CM

## 2025-02-03 PROCEDURE — 3080F DIAST BP >= 90 MM HG: CPT | Mod: CPTII,,, | Performed by: PHYSICIAN ASSISTANT

## 2025-02-03 PROCEDURE — 3008F BODY MASS INDEX DOCD: CPT | Mod: CPTII,,, | Performed by: PHYSICIAN ASSISTANT

## 2025-02-03 PROCEDURE — 3077F SYST BP >= 140 MM HG: CPT | Mod: CPTII,,, | Performed by: PHYSICIAN ASSISTANT

## 2025-02-03 PROCEDURE — 99214 OFFICE O/P EST MOD 30 MIN: CPT | Mod: S$PBB,,, | Performed by: PHYSICIAN ASSISTANT

## 2025-02-03 PROCEDURE — 1159F MED LIST DOCD IN RCRD: CPT | Mod: CPTII,,, | Performed by: PHYSICIAN ASSISTANT

## 2025-02-03 RX ORDER — LOSARTAN POTASSIUM 25 MG/1
25 TABLET ORAL DAILY
Qty: 30 TABLET | Refills: 0 | Status: SHIPPED | OUTPATIENT
Start: 2025-02-03 | End: 2025-03-05

## 2025-02-03 NOTE — PROGRESS NOTES
Subjective:      Patient ID: Rosa Anguiano is a 44 y.o. female.    Chief Complaint: Follow-up (HTN FU )      HPI  Patient is here today for blood pressure follow-up.  Patient has seen her gynecologist several times since the beginning of the year.  Patient reports elevated blood pressure readings at every visit.  Ranging from 160/90 to 190/100.  She reports she is feeling well today.  New medications started in the past 60 days include Humira.    Review of Systems   Constitutional:  Negative for activity change, appetite change, chills, diaphoresis, fatigue and unexpected weight change.   Eyes:  Negative for visual disturbance.   Respiratory:  Negative for cough, chest tightness, shortness of breath and wheezing.    Cardiovascular:  Negative for chest pain, palpitations and leg swelling.   Gastrointestinal:  Negative for abdominal pain, constipation, nausea and vomiting.   Neurological:  Negative for dizziness, vertigo, tremors, syncope, weakness, light-headedness, numbness and headaches.   Psychiatric/Behavioral:  Negative for agitation, behavioral problems, confusion, decreased concentration, dysphoric mood, hallucinations, self-injury, sleep disturbance and suicidal ideas. The patient is not nervous/anxious and is not hyperactive.        Medication List with Changes/Refills   New Medications    LOSARTAN (COZAAR) 25 MG TABLET    Take 1 tablet (25 mg total) by mouth once daily.   Current Medications    ADALIMUMAB (HUMIRA PEN SUBQ)    Inject into the skin.    CARAFATE 1 GRAM TABLET    Take 1 g by mouth 4 (four) times daily before meals and nightly.    ESTRADIOL (ESTRACE) 0.01 % (0.1 MG/GRAM) VAGINAL CREAM    Place 1 g vaginally every Mon, Wed, Fri.    FAMOTIDINE (PEPCID) 40 MG TABLET    TAKE 1 TABLET BY MOUTH ONCE A DAY AS NEEDED FOR HEARTBURN    FLUTICASONE PROPIONATE (FLONASE) 50 MCG/ACTUATION NASAL SPRAY    1 spray (50 mcg total) by Each Nostril route once daily.    FOLIC ACID ORAL    Take by mouth.    FREESTYLE  MICHAEL 3 PLUS SENSOR MARIELENA    Apply one device TO SKIN AND replace EVERY 14 DAYS    HUMALOG KWIKPEN INSULIN 100 UNIT/ML PEN    Inject subcutaneously three times a DAY with meals as PER sliding scale. Max 30 units/day. <200 = 0 units. 201-250 = 3 units. 251-300 = 6 units. 301-350 = 9 units. >350 = 12 units.    HYDROCORTISONE (ANUSOL-HC) 25 MG SUPPOSITORY    Place 25 mg rectally as needed.    HYDROXYZINE HCL (ATARAX) 10 MG TAB    Take 1 tablet (10 mg total) by mouth nightly.    LANTUS SOLOSTAR U-100 INSULIN 100 UNIT/ML (3 ML) INPN PEN    Inject 19 Units into the skin once daily.    LINACLOTIDE (LINZESS) 72 MCG CAP CAPSULE    Take 1 capsule (72 mcg total) by mouth before breakfast.    METHEN-M.BLUE-S.PHOS-PHSAL-HYO (URIBEL) 118-10-40.8-36 MG CAP    Take 1 capsule by mouth 3 (three) times daily as needed (bladder pain).    METHOCARBAMOL (ROBAXIN) 500 MG TAB    Take 1 tablet (500 mg total) by mouth 4 (four) times daily as needed (spasms).    METHOTREXATE 2.5 MG TAB    Take 2.5 mg by mouth every 7 days.    MONTELUKAST (SINGULAIR) 10 MG TABLET    Take 10 mg by mouth once daily.    MOUNJARO 2.5 MG/0.5 ML PNIJ    Inject 2.5 mg into the skin every 7 days.    NYSTATIN (MYCOSTATIN) CREAM    Apply topically 2 (two) times daily.    ONDANSETRON (ZOFRAN) 4 MG TABLET    Take 4 mg by mouth every 6 (six) hours as needed.    PANTOPRAZOLE (PROTONIX) 40 MG TABLET    Take 1 tablet (40 mg total) by mouth once daily.    PREGABALIN (LYRICA) 150 MG CAPSULE    Take 1 capsule (150 mg total) by mouth 2 (two) times daily.    PROCTOZONE-HC 2.5 % RECTAL CREAM    Place 30 g rectally as needed.   Discontinued Medications    BENZONATATE (TESSALON) 100 MG CAPSULE    Take 1 capsule (100 mg total) by mouth 3 (three) times daily as needed for Cough.    CLOTRIMAZOLE-BETAMETHASONE 1-0.05% (LOTRISONE) CREAM    Apply topically 2 (two) times daily.    FLUCONAZOLE (DIFLUCAN) 150 MG TAB    Take 150 mg by mouth once a week.    MUPIROCIN (BACTROBAN) 2 % OINTMENT     "Apply topically 3 (three) times daily.    UNABLE TO FIND    medication name: magnesium        Objective:     Vitals:    02/03/25 1005   BP: (!) 152/98   Pulse: 99   Resp: 15   SpO2: 99%   Weight: 80.8 kg (178 lb 3.2 oz)   Height: 4' 11" (1.499 m)        Physical Exam  Constitutional:       Appearance: Normal appearance. She is normal weight.   HENT:      Head: Normocephalic and atraumatic.      Nose: Nose normal.   Eyes:      Conjunctiva/sclera: Conjunctivae normal.      Comments: Eyes tracking normal on exam    Cardiovascular:      Rate and Rhythm: Normal rate and regular rhythm.      Pulses: Normal pulses.      Heart sounds: Normal heart sounds. No murmur heard.     No friction rub. No gallop.   Pulmonary:      Effort: Pulmonary effort is normal. No respiratory distress.      Breath sounds: Normal breath sounds. No stridor. No wheezing, rhonchi or rales.   Musculoskeletal:      Right lower leg: No edema.      Left lower leg: No edema.      Comments: Moves all extremities well and with good control  Normal gait observed      Skin:     General: Skin is warm and dry.      Capillary Refill: Capillary refill takes less than 2 seconds.   Neurological:      Mental Status: She is alert and oriented to person, place, and time.   Psychiatric:         Mood and Affect: Mood normal.         Behavior: Behavior normal.         Thought Content: Thought content normal.         Judgment: Judgment normal.            Assessment & Plan:     Hypertension, unspecified type  Comments:  starting losartan today - 2 wk f/u with me with BP log  Orders:  -     losartan (COZAAR) 25 MG tablet; Take 1 tablet (25 mg total) by mouth once daily.  Dispense: 30 tablet; Refill: 0           -Patient instructed that our office calls back on all labs and imaging within 1 week of receiving the results. Patient instructed to reach out to our office if they have not heard from us so that we can request the results from the lab/imaging center           Tameka " AKBAR Dyson PA-C

## 2025-02-07 ENCOUNTER — TELEPHONE (OUTPATIENT)
Dept: UROLOGY | Facility: CLINIC | Age: 45
End: 2025-02-07
Payer: COMMERCIAL

## 2025-02-07 DIAGNOSIS — R30.0 DYSURIA: ICD-10-CM

## 2025-02-07 NOTE — TELEPHONE ENCOUNTER
Medication sent out.    ----- Message from Darcie sent at 2/7/2025  2:58 PM CST -----  Contact: Rosa  Type:  RX Refill Request    Who Called: Rosa  Refill or New Rx:refill  RX Name and Strength:  1. methen-m.blue-s.phos-phsal-hyo (URIBEL) 118-10-40.8-36 mg Cap  2. methocarbamoL (ROBAXIN) 500 MG Tab  How is the patient currently taking it? (ex. 1XDay):1 x day  Is this a 30 day or 90 day RX: 30 days  Preferred Pharmacy with phone number:  [facility] - [place], [place] - [address]  [address]  [place] [address]  Phone: [phone] Fax: [phone]  Local or Mail Order:local  Ordering Provider:LATRICE Nieves  Would the patient rather a call back or a response via [facility]? Call back  Best Call Back Number:[phone]  Thanks!

## 2025-02-18 ENCOUNTER — OFFICE VISIT (OUTPATIENT)
Facility: CLINIC | Age: 45
End: 2025-02-18
Payer: COMMERCIAL

## 2025-02-18 VITALS
DIASTOLIC BLOOD PRESSURE: 82 MMHG | SYSTOLIC BLOOD PRESSURE: 140 MMHG | RESPIRATION RATE: 16 BRPM | HEART RATE: 78 BPM | HEIGHT: 59 IN | BODY MASS INDEX: 35.88 KG/M2 | OXYGEN SATURATION: 98 % | WEIGHT: 178 LBS

## 2025-02-18 DIAGNOSIS — I10 HYPERTENSION, UNSPECIFIED TYPE: Primary | ICD-10-CM

## 2025-02-18 PROCEDURE — 99213 OFFICE O/P EST LOW 20 MIN: CPT | Mod: S$GLB,,, | Performed by: PHYSICIAN ASSISTANT

## 2025-02-18 NOTE — PROGRESS NOTES
Subjective:      Patient ID: Rosa Anguiano is a 44 y.o. female.    Chief Complaint: Follow-up (2 week F/U /Her machine said it was 129/87)      HPI  Patient is here today for hypertension follow-up  She has not started her medications that were prescribed at the last visit.  Her blood pressure cuff which she has been using to check at home is inaccurate.  Pressure reading today is 140/82, compared to her at home cuff read a few minutes later which was 125/85.      Review of Systems   Constitutional:  Negative for activity change, appetite change, chills, diaphoresis, fatigue and unexpected weight change.   Eyes:  Negative for visual disturbance.   Respiratory:  Negative for cough, chest tightness, shortness of breath and wheezing.    Cardiovascular:  Negative for chest pain, palpitations and leg swelling.   Gastrointestinal:  Negative for abdominal pain, constipation, nausea and vomiting.   Neurological:  Negative for dizziness, vertigo, tremors, syncope, weakness, light-headedness, numbness and headaches.   Psychiatric/Behavioral:  Negative for agitation, behavioral problems, confusion, decreased concentration, dysphoric mood, hallucinations, self-injury, sleep disturbance and suicidal ideas. The patient is not nervous/anxious and is not hyperactive.        Medication List with Changes/Refills   Current Medications    ADALIMUMAB (HUMIRA PEN SUBQ)    Inject into the skin.    CARAFATE 1 GRAM TABLET    Take 1 g by mouth 4 (four) times daily before meals and nightly.    ESTRADIOL (ESTRACE) 0.01 % (0.1 MG/GRAM) VAGINAL CREAM    Place 1 g vaginally every Mon, Wed, Fri.    FAMOTIDINE (PEPCID) 40 MG TABLET    TAKE 1 TABLET BY MOUTH ONCE A DAY AS NEEDED FOR HEARTBURN    FLUTICASONE PROPIONATE (FLONASE) 50 MCG/ACTUATION NASAL SPRAY    1 spray (50 mcg total) by Each Nostril route once daily.    FOLIC ACID ORAL    Take by mouth.    FREESTYLE MICHAEL 3 PLUS SENSOR MARIELENA    Apply one device TO SKIN AND replace EVERY 14 DAYS     "HUMALOG KWIKPEN INSULIN 100 UNIT/ML PEN    Inject subcutaneously three times a DAY with meals as PER sliding scale. Max 30 units/day. <200 = 0 units. 201-250 = 3 units. 251-300 = 6 units. 301-350 = 9 units. >350 = 12 units.    HYDROCORTISONE (ANUSOL-HC) 25 MG SUPPOSITORY    Place 25 mg rectally as needed.    HYDROXYZINE HCL (ATARAX) 10 MG TAB    Take 1 tablet (10 mg total) by mouth nightly.    LANTUS SOLOSTAR U-100 INSULIN 100 UNIT/ML (3 ML) INPN PEN    Inject 19 Units into the skin once daily.    LINACLOTIDE (LINZESS) 72 MCG CAP CAPSULE    Take 1 capsule (72 mcg total) by mouth before breakfast.    LOSARTAN (COZAAR) 25 MG TABLET    Take 1 tablet (25 mg total) by mouth once daily.    METHEN-M.BLUE-S.PHOS-PHSAL-HYO (URIBEL) 118-10-40.8-36 MG CAP    Take 1 capsule by mouth 3 (three) times daily as needed (bladder pain).    METHOCARBAMOL (ROBAXIN) 500 MG TAB    Take 1 tablet (500 mg total) by mouth 4 (four) times daily as needed (spasms).    METHOTREXATE 2.5 MG TAB    Take 2.5 mg by mouth every 7 days.    MONTELUKAST (SINGULAIR) 10 MG TABLET    Take 10 mg by mouth once daily.    MOUNJARO 2.5 MG/0.5 ML PNIJ    Inject 2.5 mg into the skin every 7 days.    NYSTATIN (MYCOSTATIN) CREAM    Apply topically 2 (two) times daily.    ONDANSETRON (ZOFRAN) 4 MG TABLET    Take 4 mg by mouth every 6 (six) hours as needed.    PANTOPRAZOLE (PROTONIX) 40 MG TABLET    Take 1 tablet (40 mg total) by mouth once daily.    PREGABALIN (LYRICA) 150 MG CAPSULE    Take 1 capsule (150 mg total) by mouth 2 (two) times daily.    PROCTOZONE-HC 2.5 % RECTAL CREAM    Place 30 g rectally as needed.        Objective:     Vitals:    02/18/25 1138   BP: (!) 140/82   Pulse: 78   Resp: 16   SpO2: 98%   Weight: 80.7 kg (178 lb)   Height: 4' 11" (1.499 m)        Physical Exam  HENT:      Head: Normocephalic and atraumatic.      Nose: Nose normal.   Eyes:      Extraocular Movements: Extraocular movements intact.   Pulmonary:      Effort: Pulmonary effort is " normal.   Neurological:      Mental Status: She is alert.   Psychiatric:         Mood and Affect: Mood normal.         Thought Content: Thought content normal.         Judgment: Judgment normal.            Assessment & Plan:     Hypertension, unspecified type  Comments:  Start losartan, keep log, one-week follow-up           -Patient instructed that our office calls back on all labs and imaging within 1 week of receiving the results. Patient instructed to reach out to our office if they have not heard from us so that we can request the results from the lab/imaging center           Tameka Dyson PA-C

## 2025-02-25 DIAGNOSIS — G89.29 CHRONIC PAIN: ICD-10-CM

## 2025-02-25 DIAGNOSIS — G62.9 PERIPHERAL NEUROPATHY: Primary | ICD-10-CM

## 2025-04-10 DIAGNOSIS — M25.541 PAIN IN JOINT OF RIGHT HAND: ICD-10-CM

## 2025-04-10 DIAGNOSIS — M79.7 FIBROMYALGIA: ICD-10-CM

## 2025-04-10 DIAGNOSIS — Z79.899 LONG TERM CURRENT USE OF THERAPEUTIC DRUG: ICD-10-CM

## 2025-04-10 DIAGNOSIS — M05.79 RHEUMATOID ARTHRITIS INVOLVING MULTIPLE SITES WITH POSITIVE RHEUMATOID FACTOR: Primary | ICD-10-CM

## 2025-04-24 DIAGNOSIS — R30.0 DYSURIA: ICD-10-CM

## 2025-04-24 RX ORDER — METHENAMINE, SODIUM PHOSPHATE, MONOBASIC, ANHYDROUS, PHENYL SALICYLATE, METHYLENE BLUE AND HYOSCYAMINE SULFATE 118; 40.8; 36; 10; .12 MG/1; MG/1; MG/1; MG/1; MG/1
CAPSULE ORAL
Qty: 30 CAPSULE | Refills: 0 | Status: SHIPPED | OUTPATIENT
Start: 2025-04-24

## 2025-05-21 ENCOUNTER — TELEPHONE (OUTPATIENT)
Dept: UROLOGY | Facility: CLINIC | Age: 45
End: 2025-05-21
Payer: COMMERCIAL

## 2025-05-21 NOTE — TELEPHONE ENCOUNTER
Attempted to return call to pt, left VM.    ----- Message from [facility] sent at 5/21/2025  3:16 PM CDT -----  Contact: BECKI ESCOBAR [[id number]]  ..Type:  Patient Requesting CallWho Called:BECKI ESCOBAR [[id number]]Would the patient rather a call back or a response via [facility]? [facility] Call Back Number:.[phone] (home) Additional Information: Patient called to schedule an appointment

## 2025-07-07 ENCOUNTER — TELEPHONE (OUTPATIENT)
Dept: UROLOGY | Facility: CLINIC | Age: 45
End: 2025-07-07

## 2025-07-07 ENCOUNTER — OFFICE VISIT (OUTPATIENT)
Dept: UROLOGY | Facility: CLINIC | Age: 45
End: 2025-07-07
Payer: COMMERCIAL

## 2025-07-07 VITALS
DIASTOLIC BLOOD PRESSURE: 68 MMHG | HEART RATE: 92 BPM | SYSTOLIC BLOOD PRESSURE: 118 MMHG | HEIGHT: 59 IN | BODY MASS INDEX: 35.28 KG/M2 | WEIGHT: 175 LBS

## 2025-07-07 DIAGNOSIS — N39.41 URGE INCONTINENCE OF URINE: ICD-10-CM

## 2025-07-07 DIAGNOSIS — R30.0 DYSURIA: ICD-10-CM

## 2025-07-07 DIAGNOSIS — N39.3 STRESS INCONTINENCE: ICD-10-CM

## 2025-07-07 DIAGNOSIS — N22 CALCULUS OF URINARY TRACT IN DISEASES CLASSIFIED ELSEWHERE: Primary | ICD-10-CM

## 2025-07-07 DIAGNOSIS — N30.10 INTERSTITIAL CYSTITIS: ICD-10-CM

## 2025-07-07 DIAGNOSIS — N32.89 BLADDER SPASMS: ICD-10-CM

## 2025-07-07 PROCEDURE — 3078F DIAST BP <80 MM HG: CPT | Mod: CPTII,,, | Performed by: FAMILY MEDICINE

## 2025-07-07 PROCEDURE — 99214 OFFICE O/P EST MOD 30 MIN: CPT | Mod: S$PBB,,, | Performed by: FAMILY MEDICINE

## 2025-07-07 PROCEDURE — 4010F ACE/ARB THERAPY RXD/TAKEN: CPT | Mod: CPTII,,, | Performed by: FAMILY MEDICINE

## 2025-07-07 PROCEDURE — 3008F BODY MASS INDEX DOCD: CPT | Mod: CPTII,,, | Performed by: FAMILY MEDICINE

## 2025-07-07 PROCEDURE — 1159F MED LIST DOCD IN RCRD: CPT | Mod: CPTII,,, | Performed by: FAMILY MEDICINE

## 2025-07-07 PROCEDURE — 3074F SYST BP LT 130 MM HG: CPT | Mod: CPTII,,, | Performed by: FAMILY MEDICINE

## 2025-07-07 RX ORDER — METHOCARBAMOL 500 MG/1
500 TABLET, FILM COATED ORAL 4 TIMES DAILY PRN
Qty: 40 TABLET | Refills: 3 | Status: SHIPPED | OUTPATIENT
Start: 2025-07-07

## 2025-07-07 RX ORDER — VIBEGRON 75 MG/1
1 TABLET, FILM COATED ORAL DAILY
COMMUNITY
Start: 2025-03-19 | End: 2025-07-07 | Stop reason: SDUPTHER

## 2025-07-07 RX ORDER — TRAMADOL HYDROCHLORIDE 50 MG/1
50 TABLET, FILM COATED ORAL EVERY 6 HOURS PRN
COMMUNITY
Start: 2025-05-12

## 2025-07-07 RX ORDER — METHENAMINE, SODIUM PHOSPHATE, MONOBASIC, ANHYDROUS, PHENYL SALICYLATE, METHYLENE BLUE AND HYOSCYAMINE SULFATE 118; 40.8; 36; 10; .12 MG/1; MG/1; MG/1; MG/1; MG/1
1 CAPSULE ORAL 3 TIMES DAILY PRN
Qty: 30 CAPSULE | Refills: 11 | Status: SHIPPED | OUTPATIENT
Start: 2025-07-07

## 2025-07-07 RX ORDER — VIBEGRON 75 MG/1
1 TABLET, FILM COATED ORAL DAILY
Qty: 30 TABLET | Refills: 11 | Status: SHIPPED | OUTPATIENT
Start: 2025-07-07 | End: 2026-07-07

## 2025-07-07 RX ORDER — LINACLOTIDE 145 UG/1
145 CAPSULE, GELATIN COATED ORAL
COMMUNITY

## 2025-07-07 RX ORDER — MELOXICAM 15 MG/1
15 TABLET ORAL
COMMUNITY
Start: 2025-05-15 | End: 2025-08-13

## 2025-07-07 RX ORDER — TIZANIDINE 2 MG/1
2 TABLET ORAL 2 TIMES DAILY PRN
COMMUNITY
Start: 2025-05-12

## 2025-07-07 NOTE — PROGRESS NOTES
Subjective:       Patient ID: Rosa Anguiano is a 44 y.o. female.    Chief Complaint: No chief complaint on file.      HPI: 44-year-old female patient presenting to the clinic for yearly follow up.  Patient has a history of stress incontinence.  The patient has had cystoscopy in the past recommending TOT however the patient declined due to rheumatoid arthritis.  She has tried pelvic floor therapy with no improvement.      She also has a history of interstitial cystitis for which she underwent cysto hydrodistention in the past.  She also takes Gemtesa, Uribel, Robaxin, D mannose and aloe vera.     She does have a history of kidney stones.  She has been experiencing some left lower quadrant pain for approximately 1 week intermittently       Past Medical History:   Past Medical History:   Diagnosis Date    Acute pelvic pain, female     Breast pain     Cystitis     Diabetes mellitus     Dyspareunia, female     Endometriosis     Fibromyalgia     GERD (gastroesophageal reflux disease)     IBS (irritable bowel syndrome)     Interstitial cystitis     Irregular menstrual cycle     Leukorrhea     Osteoarthritis     Ovarian cyst     Pneumonia     YAMILET (stress urinary incontinence, female)     Vaginal yeast infection     Vulvitis        Past Surgical Historical:   Past Surgical History:   Procedure Laterality Date     SECTION      CHOLECYSTECTOMY      LAPAROSCOPY-ASSISTED VAGINAL HYSTERECTOMY      TUBAL LIGATION          Medications:   Medication List with Changes/Refills   Current Medications    ADALIMUMAB (HUMIRA PEN SUBQ)    Inject into the skin.    CARAFATE 1 GRAM TABLET    Take 1 g by mouth 4 (four) times daily before meals and nightly.    ESTRADIOL (ESTRACE) 0.01 % (0.1 MG/GRAM) VAGINAL CREAM    Place 1 g vaginally every Mon, Wed, Fri.    FAMOTIDINE (PEPCID) 40 MG TABLET    TAKE 1 TABLET BY MOUTH ONCE A DAY AS NEEDED FOR HEARTBURN    FLUTICASONE PROPIONATE (FLONASE) 50 MCG/ACTUATION NASAL SPRAY    1 spray (50 mcg  total) by Each Nostril route once daily.    FOLIC ACID ORAL    Take by mouth.    FREESTYLE MICHAEL 3 PLUS SENSOR MARIELENA    Apply one device TO SKIN AND replace EVERY 14 DAYS    GEMTESA 75 MG TAB    Take 1 tablet by mouth Daily.    HUMALOG KWIKPEN INSULIN 100 UNIT/ML PEN    Inject subcutaneously three times a DAY with meals as PER sliding scale. Max 30 units/day. <200 = 0 units. 201-250 = 3 units. 251-300 = 6 units. 301-350 = 9 units. >350 = 12 units.    HYDROCORTISONE (ANUSOL-HC) 25 MG SUPPOSITORY    Place 25 mg rectally as needed.    HYDROXYZINE HCL (ATARAX) 10 MG TAB    Take 1 tablet (10 mg total) by mouth nightly.    LANTUS SOLOSTAR U-100 INSULIN 100 UNIT/ML (3 ML) INPN PEN    Inject 19 Units into the skin once daily.    LINZESS 145 MCG CAP CAPSULE    Take 145 mcg by mouth.    LOSARTAN (COZAAR) 25 MG TABLET    Take 1 tablet (25 mg total) by mouth once daily.    MELOXICAM (MOBIC) 15 MG TABLET    Take 15 mg by mouth.    METHOCARBAMOL (ROBAXIN) 500 MG TAB    Take 1 tablet (500 mg total) by mouth 4 (four) times daily as needed (spasms).    METHOTREXATE 2.5 MG TAB    Take 2.5 mg by mouth every 7 days.    MONTELUKAST (SINGULAIR) 10 MG TABLET    Take 10 mg by mouth once daily.    MOUNJARO 2.5 MG/0.5 ML PNIJ    Inject 5 mg into the skin every 7 days.    NYSTATIN (MYCOSTATIN) CREAM    Apply topically 2 (two) times daily.    ONDANSETRON (ZOFRAN) 4 MG TABLET    Take 4 mg by mouth every 6 (six) hours as needed.    PANTOPRAZOLE (PROTONIX) 40 MG TABLET    Take 1 tablet (40 mg total) by mouth once daily.    PREGABALIN (LYRICA) 150 MG CAPSULE    Take 1 capsule (150 mg total) by mouth 2 (two) times daily.    PROCTOZONE-HC 2.5 % RECTAL CREAM    Place 30 g rectally as needed.    TIZANIDINE (ZANAFLEX) 2 MG TABLET    Take 2 mg by mouth 2 (two) times daily as needed.    TRAMADOL (ULTRAM) 50 MG TABLET    Take 50 mg by mouth every 6 (six) hours as needed.    URO--10-40.8-36 MG CAP    TAKE 1 CAPSULE BY MOUTH THREE TIMES DAILY AS NEEDED  FOR BLADDER PAIN        Past Social History:   Social History     Socioeconomic History    Marital status:    Tobacco Use    Smoking status: Never    Smokeless tobacco: Never   Substance and Sexual Activity    Alcohol use: Not Currently    Drug use: Never    Sexual activity: Yes     Partners: Male     Birth control/protection: See Surgical Hx     Comment: Hysterectomy       Allergies:   Review of patient's allergies indicates:   Allergen Reactions    Nabumetone Other (See Comments)     Diffculty swallowing    Dulaglutide Nausea And Vomiting     Severe diarrhea        Family History:   Family History   Problem Relation Name Age of Onset    Colon cancer Father  60    Prostate cancer Father  60    Breast cancer Sister  66    Ovarian cancer Neg Hx      Uterine cancer Neg Hx      Melanoma Neg Hx      Pancreatic cancer Neg Hx          Review of Systems:  Review of Systems   Constitutional:  Negative for activity change, appetite change, chills, diaphoresis, fatigue, fever and unexpected weight change.   HENT:  Negative for congestion, dental problem, drooling, ear discharge, ear pain, facial swelling, hearing loss, mouth sores, nosebleeds, postnasal drip, rhinorrhea, sinus pressure, sinus pain, sneezing, sore throat, tinnitus, trouble swallowing and voice change.    Eyes:  Negative for photophobia, pain, discharge, redness, itching and visual disturbance.   Respiratory:  Negative for apnea, cough, choking, chest tightness, shortness of breath, wheezing and stridor.    Cardiovascular:  Negative for chest pain and leg swelling.   Gastrointestinal:  Negative for abdominal distention, abdominal pain, anal bleeding, blood in stool, constipation, diarrhea, nausea, rectal pain and vomiting.   Endocrine: Negative for cold intolerance, heat intolerance, polydipsia, polyphagia and polyuria.   Genitourinary:  Positive for frequency and urgency. Negative for decreased urine volume, difficulty urinating, dyspareunia, dysuria,  enuresis, flank pain, genital sores, hematuria, menstrual problem, pelvic pain, vaginal bleeding, vaginal discharge and vaginal pain.   Musculoskeletal:  Negative for arthralgias, back pain, gait problem, joint swelling, myalgias, neck pain and neck stiffness.   Skin:  Negative for color change, pallor, rash and wound.   Allergic/Immunologic: Negative for environmental allergies, food allergies and immunocompromised state.   Neurological:  Negative for dizziness, tremors, seizures, syncope, facial asymmetry, speech difficulty, weakness, light-headedness, numbness and headaches.   Hematological:  Negative for adenopathy. Does not bruise/bleed easily.   Psychiatric/Behavioral:  Negative for agitation, behavioral problems, confusion, decreased concentration, dysphoric mood, hallucinations, self-injury, sleep disturbance and suicidal ideas. The patient is not nervous/anxious and is not hyperactive.        Physical Exam:  Physical Exam  Exam conducted with a chaperone present.   Constitutional:       Appearance: Normal appearance.   HENT:      Head: Normocephalic.      Nose: Nose normal.      Mouth/Throat:      Mouth: Mucous membranes are moist.      Pharynx: Oropharynx is clear.   Eyes:      Extraocular Movements: Extraocular movements intact.      Conjunctiva/sclera: Conjunctivae normal.      Pupils: Pupils are equal, round, and reactive to light.   Cardiovascular:      Rate and Rhythm: Normal rate and regular rhythm.      Pulses: Normal pulses.      Heart sounds: Normal heart sounds.   Pulmonary:      Effort: Pulmonary effort is normal.      Breath sounds: Normal breath sounds.   Abdominal:      General: Abdomen is flat. Bowel sounds are normal.      Palpations: Abdomen is soft.      Hernia: There is no hernia in the left inguinal area or right inguinal area.   Genitourinary:     General: Normal vulva.      Pubic Area: No rash or pubic lice.       Labia:         Right: No rash, tenderness, lesion or injury.          Left: No rash, tenderness, lesion or injury.       Urethra: No prolapse, urethral pain, urethral swelling or urethral lesion.      Rectum: Normal.   Musculoskeletal:         General: Normal range of motion.      Cervical back: Normal range of motion and neck supple.   Lymphadenopathy:      Lower Body: No right inguinal adenopathy. No left inguinal adenopathy.   Skin:     General: Skin is warm and dry.      Capillary Refill: Capillary refill takes less than 2 seconds.   Neurological:      General: No focal deficit present.      Mental Status: She is alert and oriented to person, place, and time. Mental status is at baseline.   Psychiatric:         Mood and Affect: Mood normal.         Behavior: Behavior normal.         Thought Content: Thought content normal.         Judgment: Judgment normal.       Assessment/Plan:     Stress incontinence:  Discussed cystoscopy for evaluation and TOT.  I encouraged her to diligently practice Kegel exercises and timed voiding to reduce urinary leakage. PVR 1 ml    Interstitial cystitis: Refill the patient's medications at this time.  Avoid dietary triggers.  The patient has had cysto hydrodistention in the past.    Urinary urgency and frequency:  Continue Gemtesa.    History of kidney stones: Patient complains of 7/10 pain in the left lower quadrant.  Sent for stat CT.  Follow up based on results.  Report to the ER for any worsening pain    Follow up in 1 year.  Complete urine drop-off if symptoms of infection or interstitial cystitis develop.  Problem List Items Addressed This Visit    None  Visit Diagnoses         Calculus of urinary tract in diseases classified elsewhere    -  Primary    Relevant Orders    CT Renal Stone Study ABD Pelvis WO

## 2025-07-07 NOTE — TELEPHONE ENCOUNTER
"Contacted pt to follow up regarding CT ordered this AM. Pt states "they called me and told me that I have to schedule an appt., I had lost signal with them and had to hangup." Offered to provide Centralized Scheduling number, pt verbalized she has the number.KENDELL  "

## 2025-07-16 ENCOUNTER — TELEPHONE (OUTPATIENT)
Dept: UROLOGY | Facility: CLINIC | Age: 45
End: 2025-07-16
Payer: COMMERCIAL

## 2025-07-16 NOTE — TELEPHONE ENCOUNTER
Notified patient of CT results that show no stones. Patient stated that she is still experiencing pain to left lower quadrant of abdomen. Informed that DR. Lloyd will be back in office tomorrow & that I will inform him of her complaints & see what he suggests. Verbalized understanding.               ----- Message from Robert Lloyd MD sent at 7/14/2025  4:59 PM CDT -----  No stones noted on CT please inform the patient  ----- Message -----  From: Antonia Nievse NP  Sent: 7/11/2025   8:55 AM CDT  To: Robert Lloyd MD      ----- Message -----  From: Tamy Mascorro LPN  Sent: 7/11/2025   8:45 AM CDT  To: Antonia Nieves NP

## 2025-08-22 ENCOUNTER — TELEPHONE (OUTPATIENT)
Dept: UROLOGY | Facility: CLINIC | Age: 45
End: 2025-08-22
Payer: COMMERCIAL